# Patient Record
Sex: MALE | Race: BLACK OR AFRICAN AMERICAN | Employment: OTHER | URBAN - METROPOLITAN AREA
[De-identification: names, ages, dates, MRNs, and addresses within clinical notes are randomized per-mention and may not be internally consistent; named-entity substitution may affect disease eponyms.]

---

## 2017-03-08 ENCOUNTER — OFFICE VISIT (OUTPATIENT)
Dept: INTERNAL MEDICINE CLINIC | Age: 77
End: 2017-03-08

## 2017-03-08 VITALS
DIASTOLIC BLOOD PRESSURE: 79 MMHG | WEIGHT: 182.8 LBS | TEMPERATURE: 96.7 F | HEIGHT: 72 IN | BODY MASS INDEX: 24.76 KG/M2 | OXYGEN SATURATION: 99 % | HEART RATE: 60 BPM | SYSTOLIC BLOOD PRESSURE: 130 MMHG | RESPIRATION RATE: 18 BRPM

## 2017-03-08 DIAGNOSIS — I10 ESSENTIAL HYPERTENSION: ICD-10-CM

## 2017-03-08 DIAGNOSIS — Z00.00 ROUTINE GENERAL MEDICAL EXAMINATION AT A HEALTH CARE FACILITY: ICD-10-CM

## 2017-03-08 DIAGNOSIS — I25.5 CARDIOMYOPATHY, ISCHEMIC: ICD-10-CM

## 2017-03-08 DIAGNOSIS — R79.89 RENAL AZOTEMIA: ICD-10-CM

## 2017-03-08 DIAGNOSIS — I25.84 CORONARY ARTERY DISEASE DUE TO CALCIFIED CORONARY LESION: Primary | ICD-10-CM

## 2017-03-08 DIAGNOSIS — M75.02 ADHESIVE CAPSULITIS OF LEFT SHOULDER: ICD-10-CM

## 2017-03-08 DIAGNOSIS — E78.5 DYSLIPIDEMIA: ICD-10-CM

## 2017-03-08 DIAGNOSIS — I25.10 CORONARY ARTERY DISEASE DUE TO CALCIFIED CORONARY LESION: Primary | ICD-10-CM

## 2017-03-08 NOTE — PROGRESS NOTES
1. Have you been to the ER, urgent care clinic since your last visit? Hospitalized since your last visit? No    2. Have you seen or consulted any other health care providers outside of the [address] [place] [address] since your last visit? Include any pap smears or colon screening.  No

## 2017-03-08 NOTE — MR AVS SNAPSHOT
Visit Information Date & Time Provider Department Dept. Phone Encounter #  
 3/8/2017 10:30 AM Lavelle Gutierres MD [facility] - [facility] [phone] [id number] Upcoming Health Maintenance Date Due Pneumococcal 65+ Low/Medium Risk (2 of 2 - PCV13) 4/5/2015 INFLUENZA AGE 9 TO ADULT 8/1/2016 MEDICARE YEARLY EXAM 2/3/2017 GLAUCOMA SCREENING Q2Y 5/20/2017 DTaP/Tdap/Td series (2 - Td) 3/8/2027 Allergies as of 3/8/2017  Review Complete On: 3/8/2017 By: Eyad Hunter Severity Noted Reaction Type Reactions Clopidogrel  04/04/2014    Other (comments) bradycardia Current Immunizations  Never Reviewed Name Date Influenza Vaccine 10/31/2015, 10/1/2014 Pneumococcal Polysaccharide (PPSV-23) 4/5/2014 10:16 AM  
  
 Not reviewed this visit You Were Diagnosed With   
  
 Codes Comments Coronary artery disease due to calcified coronary lesion    -  Primary ICD-10-CM: I25.10, I25.84 ICD-9-CM: 414.00, 414.4 Essential hypertension     ICD-10-CM: I10 
ICD-9-CM: 401.9 Dyslipidemia     ICD-10-CM: E78.5 ICD-9-CM: 272.4 Cardiomyopathy, ischemic     ICD-10-CM: I25.5 ICD-9-CM: 414.8 Renal azotemia     ICD-10-CM: R79.89 ICD-9-CM: 790.6 Adhesive capsulitis of left shoulder     ICD-10-CM: M75.02 
ICD-9-CM: 726.0 Vitals BP Pulse Temp Resp Height(growth percentile) Weight(growth percentile) 130/79 (BP 1 Location: Left arm, BP Patient Position: Sitting) 60 96.7 °F (35.9 °C) (Oral) 18 6' (1.829 m) 182 lb 12.8 oz (82.9 kg) SpO2 BMI Smoking Status 99% 24.79 kg/m2 Never Smoker Vitals History BMI and BSA Data Body Mass Index Body Surface Area 24.79 kg/m 2 2.05 m 2 Preferred Pharmacy Pharmacy Name Phone [id number] [place], [place] - [address] [phone] Your Updated Medication List  
  
   
 This list is accurate as of: 3/8/17  1:20 PM.  Always use your most recent med list.  
  
  
  
  
 carvedilol 3.125 mg tablet Commonly known as:  Greg Salgado Dutasteride-Tamsulosin 0.5-0.4 mg Cm24 Commonly known as:  JANNA  
TAKE ONE CAPSULE BY MOUTH EVERY DAY  
  
 fish oil-omega-3 fatty acids 340-1,000 mg capsule Take 2 Caps by mouth daily. HYDROcodone-acetaminophen 5-325 mg per tablet Commonly known as:  Birda [place] Take 1 Tab by mouth every six (6) hours as needed for Pain. Max Daily Amount: 4 Tabs. hydrOXYzine HCl 10 mg tablet Commonly known as:  ATARAX Take 10 mg by mouth three (3) times daily as needed for Itching. latanoprost 0.005 % ophthalmic solution Commonly known as:  Christel Furbish Administer 1 Drop to both eyes nightly. lisinopril 10 mg tablet Commonly known as:  Robertha Roup Take  by mouth daily. multivitamin,tx-iron-ca-min 27-0.4 mg Tab Commonly known as:  THERA-M w/ IRON Take 1 Tab by mouth daily. nitroglycerin 0.4 mg SL tablet Commonly known as:  NITROSTAT  
0.4 mg by SubLINGual route every five (5) minutes as needed for Chest Pain.  
  
 pravastatin 80 mg tablet Commonly known as:  PRAVACHOL  
TAKE 1 TABLET BY MOUTH AT BEDTIME  
  
 salsalate 500 mg tablet Commonly known as:  DISALCID Take 1 Tab by mouth two (2) times a day. Introducing [facility] SERVICES! Dear Ela Reddy: Thank you for requesting a [employer] account. Our records indicate that you already have an active [employer] account. You can access your account anytime at [email or web address] Did you know that you can access your hospital and ER discharge instructions at any time in [employer]? You can also review all of your test results from your hospital stay or ER visit. Additional Information If you have questions, please visit the Frequently Asked Questions section of the [employer] website at [email or web address] Remember, MyChart is NOT to be used for urgent needs. For medical emergencies, dial 911. Now available from your iPhone and Android! Please provide this summary of care documentation to your next provider. Your primary care clinician is listed as Ronald Mckee. If you have any questions after today's visit, please call [phone].

## 2017-03-08 NOTE — PROGRESS NOTES
[facility]  [place] [address]  [address]  [place] [address]  Phone:  [phone]  Fax: [phone]       Chief Complaint   Patient presents with    Follow-up    Shoulder Pain     patient complain of pain in both shoulders patient states that hes been experiencing the pain in his shoulders for 2 months   . SUBJECTIVE:    Brendan Koehler is a 64 y.o. male comes in for return visit. On his last visit I told him to reduce his Lisinopril to a half a tablet. He thought I meant his Carvedilol. When he went to the cardiologist this was corrected. He did not realize he was still taking the Lisinopril but when he called home this was indeed the case. In reality he has been taking the 5 mg of Lisinopril. He denies any shortness of breath or chest pain. He most recently had his defibrillator checked but will probably have to have his battery replaced the latter part of the year. He has been complaining of pain in his shoulders, left greater than right. The right is for the most part resolved and he is left with pain in his shoulder which is aggravated with movement. He has been actively avoiding his potassium in view of his RTA picture with mild renal insufficiency. Current Outpatient Prescriptions   Medication Sig Dispense Refill    latanoprost (XALATAN) 0.005 % ophthalmic solution Administer 1 Drop to both eyes nightly.  pravastatin (PRAVACHOL) 80 mg tablet TAKE 1 TABLET BY MOUTH AT BEDTIME 90 Tab 3    salsalate (DISALCID) 500 mg tablet Take 1 Tab by mouth two (2) times a day. 180 Tab 3    Dutasteride-Tamsulosin (JANNA) 0.5-0.4 mg CM24 TAKE ONE CAPSULE BY MOUTH EVERY DAY 90 Cap 3    carvedilol (COREG) 3.125 mg tablet   12    HYDROcodone-acetaminophen (NORCO) 5-325 mg per tablet Take 1 Tab by mouth every six (6) hours as needed for Pain. Max Daily Amount: 4 Tabs.  10 Tab 0    hydrOXYzine (ATARAX) 10 mg tablet Take 10 mg by mouth three (3) times daily as needed for Itching.  nitroglycerin (NITROSTAT) 0.4 mg SL tablet 0.4 mg by SubLINGual route every five (5) minutes as needed for Chest Pain.  lisinopril (PRINIVIL, ZESTRIL) 10 mg tablet Take  by mouth daily.  fish oil-omega-3 fatty acids 340-1,000 mg capsule Take 2 Caps by mouth daily.  multivitamin,tx-iron-ca-min (THERA-M W/ IRON) 27-0.4 mg tab Take 1 Tab by mouth daily.        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    HX COLONOSCOPY      HX ORTHOPAEDIC      bilateral bunionectomy    HX PACEMAKER      VASCULAR SURGERY PROCEDURE UNLIST       Allergies   Allergen Reactions    Clopidogrel Other (comments)     bradycardia         REVIEW OF SYSTEMS:  General: negative for - chills or fever  ENT: negative for - headaches, nasal congestion or tinnitus  Respiratory: negative for - cough, hemoptysis, shortness of breath or wheezing  Cardiovascular : negative for - chest pain, edema, palpitations or shortness of breath  Gastrointestinal: negative for - abdominal pain, blood in stools, heartburn or nausea/vomiting  Genito-Urinary: no dysuria, trouble voiding, or hematuria  Musculoskeletal: negative for - gait disturbance, joint pain, joint stiffness or joint swelling  Neurological: no TIA or stroke symptoms  Hematologic: no bruises, no bleeding, no swollen glands  Integument: no lumps, mole changes, nail changes or rash  Endocrine: no malaise/lethargy or unexpected weight changes      Social History     Social History    Marital status:      Spouse name: Ethel Jimenez    Number of children: 3    Years of education: 25     Occupational History    Retired [employer]     Social History Main Topics    Smoking status: Never Smoker    Smokeless tobacco: Never Used    Alcohol use No      Comment: quit 12/10/1980    Drug use: No    Sexual activity: Not Asked     Other Topics Concern    None     Social History Narrative     Family History   Problem Relation Age of Onset    No Known Problems Mother        OBJECTIVE:    Visit Vitals    /79 (BP 1 Location: Left arm, BP Patient Position: Sitting)    Pulse 60    Temp 96.7 °F (35.9 °C) (Oral)    Resp 18    Ht 6' (1.829 m)    Wt 182 lb 12.8 oz (82.9 kg)    SpO2 99%    BMI 24.79 kg/m2     CONSTITUTIONAL: well , well nourished, appears age appropriate  EYES: perrla, eom intact  ENMT:moist mucous membranes, pharynx clear  NECK: supple. Thyroid normal  RESPIRATORY: Chest: clear to ascultation and percussion   CARDIOVASCULAR: Heart: regular rate and rhythm  GASTROINTESTINAL: Abdomen: soft, bowel sounds active  HEMATOLOGIC: no pathological lymph nodes palpated  MUSCULOSKELETAL: Extremities: no edema, pulse 1+, pain elicited to range of motion left shoulder  INTEGUMENT: No unusual rashes or suspicious skin lesions noted. Nails appear normal.  NEUROLOGIC: non-focal exam   MENTAL STATUS: alert and oriented, appropriate affect      ASSESSMENT:  1. Coronary artery disease due to calcified coronary lesion    2. Essential hypertension    3. Dyslipidemia    4. Cardiomyopathy, ischemic    5. Renal azotemia    6. Adhesive capsulitis of left shoulder    7. Routine general medical examination at a health care facility        PLAN:    1. The patient's coronary artery disease appears to be reasonably stable. He has had no suggestive angina or anginal equivalent symptoms currently. He is exercising on a fairly consistent basis. 2. Blood pressure is excellent. No adjustments are made. 3. He remains on his statin in view of his secondary cardiovascular risk prevention.     4. He does have mild renal dysfunction which leads to an RTA type picture and as a result he has a tendency to retain potassium. On his last visit I suggested he reduce his Lisinopril to half a tablet a day but he thought I meant the Carvedilol. Fortunately he has been reducing the Lisinopril. 5. He has what appears to be a pericapsulitis of his left shoulder. Under sterile technique I inject Kenalog 40 mg and Xylocaine 1% 5 cc's into the posterior aspect of the left shoulder. He tolerates the procedure well. Follow-up Disposition:  Return in about 4 months (around 7/8/2017). Hyacinth Hammans, MD    This is a Subsequent Medicare Annual Wellness Visit providing Personalized Prevention Plan Services (PPPS) (Performed 12 months after initial AWV and PPPS )    I have reviewed the patient's medical history in detail and updated the computerized patient record. 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  pravastatin (PRAVACHOL) 80 mg tablet TAKE 1 TABLET BY MOUTH AT BEDTIME 90 Tab 3    salsalate (DISALCID) 500 mg tablet Take 1 Tab by mouth two (2) times a day. 180 Tab 3    Dutasteride-Tamsulosin (JANNA) 0.5-0.4 mg CM24 TAKE ONE CAPSULE BY MOUTH EVERY DAY 90 Cap 3    carvedilol (COREG) 3.125 mg tablet   12    HYDROcodone-acetaminophen (NORCO) 5-325 mg per tablet Take 1 Tab by mouth every six (6) hours as needed for Pain. Max Daily Amount: 4 Tabs.  10 Tab 0    hydrOXYzine (ATARAX) 10 mg tablet Take 10 mg by mouth three (3) times daily as needed for Itching.  nitroglycerin (NITROSTAT) 0.4 mg SL tablet 0.4 mg by SubLINGual route every five (5) minutes as needed for Chest Pain.  lisinopril (PRINIVIL, ZESTRIL) 10 mg tablet Take  by mouth daily.  fish oil-omega-3 fatty acids 340-1,000 mg capsule Take 2 Caps by mouth daily.  multivitamin,tx-iron-ca-min (THERA-M W/ IRON) 27-0.4 mg tab Take 1 Tab by mouth daily. Allergies   Allergen Reactions    Clopidogrel Other (comments)     bradycardia     Family History   Problem Relation Age of Onset    No Known Problems Mother      Social History   Substance Use Topics    Smoking status: Never Smoker    Smokeless tobacco: Never Used    Alcohol use No      Comment: quit 12/10/1980     Patient Active Problem List   Diagnosis Code    Ankylosing spondylitis ([facility] [address] M45.9    Prostatism N40.0    Dyslipidemia E78.5    Cramp in limb R25.2    HTN (hypertension) I10    CAD (coronary artery disease) I25.10    Renal azotemia R79.89    Cardiomyopathy, ischemic I25.5       Depression Risk Factor Screening:     PHQ 2 / 9, over the last two weeks 6/8/2016   Little interest or pleasure in doing things Not at all   Feeling down, depressed or hopeless Not at all   Total Score PHQ 2 0     Alcohol Risk Factor Screening: On any occasion during the past 3 months, have you had more than 4 drinks containing alcohol? No    Do you average more than 14 drinks per week? No      Functional Ability and Level of Safety:     Hearing Loss   none    Activities of Daily Living   Self-care. Requires assistance with: no ADLs    Fall Risk     Fall Risk Assessment, last 12 mths 6/8/2016   Able to walk? Yes   Fall in past 12 months? No     Abuse Screen   Patient is not abused    Review of Systems   A comprehensive review of systems was negative.     Physical Examination     Evaluation of Cognitive Function:  Mood/affect: neutral  Appearance: age appropriate  Family member/caregiver input: na    Visit Vitals    /79 (BP 1 Location: Left arm, BP Patient Position: Sitting)    Pulse 60    Temp 96.7 °F (35.9 °C) (Oral)    Resp 18    Ht 6' (1.829 m)    Wt 182 lb 12.8 oz (82.9 kg)    SpO2 99%    BMI 24.79 kg/m2     General appearance: alert, cooperative, no distress, appears stated age  Neck: supple, symmetrical, trachea midline, no adenopathy, thyroid: not enlarged, symmetric, no tenderness/mass/nodules, no carotid bruit and no JVD  Lungs: clear to auscultation bilaterally  Heart: regular rate and rhythm, S1, S2 normal, no murmur, click, rub or gallop  Abdomen: soft, non-tender. Bowel sounds normal. No masses,  no organomegaly  Extremities: extremities normal, atraumatic, no cyanosis or edema  Neurologic: Grossly normal    Patient Care Team:  Contreras Rodriguez MD as PCP - General (Internal Medicine)    Advice/Referrals/Counseling   Education and counseling provided:  Are appropriate based on today's review and evaluation      Assessment/Plan       ICD-10-CM ICD-9-CM    1. Coronary artery disease due to calcified coronary lesion I25.10 414.00     I25.84 414.4    2. Essential hypertension I10 401.9    3. Dyslipidemia E78.5 272.4    4. Cardiomyopathy, ischemic I25.5 414.8    5. Renal azotemia R79.89 790.6    6. Adhesive capsulitis of left shoulder M75.02 726.0    7. Routine general medical examination at a health care facility Z00.00 V70.0    .

## 2017-05-06 RX ORDER — DUTASTERIDE AND TAMSULOSIN HYDROCHLORIDE CAPSULES .5; .4 MG/1; MG/1
CAPSULE ORAL
Qty: 90 CAP | Refills: 3 | Status: SHIPPED | OUTPATIENT
Start: 2017-05-06 | End: 2018-05-29 | Stop reason: SDUPTHER

## 2017-08-02 ENCOUNTER — OFFICE VISIT (OUTPATIENT)
Dept: INTERNAL MEDICINE CLINIC | Age: 77
End: 2017-08-02

## 2017-08-02 DIAGNOSIS — I10 ESSENTIAL HYPERTENSION: ICD-10-CM

## 2017-08-02 DIAGNOSIS — I25.10 CORONARY ARTERY DISEASE DUE TO CALCIFIED CORONARY LESION: ICD-10-CM

## 2017-08-02 DIAGNOSIS — E78.5 DYSLIPIDEMIA: Primary | ICD-10-CM

## 2017-08-02 DIAGNOSIS — M45.9 ANKYLOSING SPONDYLITIS, UNSPECIFIED SITE OF SPINE (HCC): ICD-10-CM

## 2017-08-02 DIAGNOSIS — N40.0 PROSTATISM: ICD-10-CM

## 2017-08-02 DIAGNOSIS — R79.89 RENAL AZOTEMIA: ICD-10-CM

## 2017-08-02 DIAGNOSIS — I25.84 CORONARY ARTERY DISEASE DUE TO CALCIFIED CORONARY LESION: ICD-10-CM

## 2017-08-02 NOTE — PROGRESS NOTES
Chief Complaint   Patient presents with    Hypertension     6 MONTH FOLLOW     Coronary Artery Disease     1. Have you been to the ER, urgent care clinic since your last visit? Hospitalized since your last visit? No    2. Have you seen or consulted any other health care providers outside of the [address] [place] [address] since your last visit? Include any pap smears or colon screening.  No

## 2017-08-02 NOTE — PROGRESS NOTES
[facility]  [place] [address]  [address] [facility] [address]  Phone:  [phone]  Fax: [phone]       Chief Complaint   Patient presents with    Hypertension     6 MONTH FOLLOW     Coronary Artery Disease   . SUBJECTIVE:    Debo Means is a 76 y.o. male Comes in for return visit stating that he has done well. He has no complaints for the most part. He denies any chest pain or shortness of breath. He is having no back pain, in the neck, lumbar or thoracic region. He does have history of prostatism and takes Jeanine. He has a past history of dyslipidemia and primary hypertension. He remains quite physically active. He takes sign language too. Current Outpatient Prescriptions   Medication Sig Dispense Refill    Dutasteride-Tamsulosin (JANNA) 0.5-0.4 mg CM24 TAKE ONE CAPSULE BY MOUTH EVERY DAY 90 Cap 3    latanoprost (XALATAN) 0.005 % ophthalmic solution Administer 1 Drop to both eyes nightly.  pravastatin (PRAVACHOL) 80 mg tablet TAKE 1 TABLET BY MOUTH AT BEDTIME 90 Tab 3    salsalate (DISALCID) 500 mg tablet Take 1 Tab by mouth two (2) times a day. 180 Tab 3    carvedilol (COREG) 3.125 mg tablet   12    HYDROcodone-acetaminophen (NORCO) 5-325 mg per tablet Take 1 Tab by mouth every six (6) hours as needed for Pain. Max Daily Amount: 4 Tabs. 10 Tab 0    hydrOXYzine (ATARAX) 10 mg tablet Take 10 mg by mouth three (3) times daily as needed for Itching.  nitroglycerin (NITROSTAT) 0.4 mg SL tablet 0.4 mg by SubLINGual route every five (5) minutes as needed for Chest Pain.  lisinopril (PRINIVIL, ZESTRIL) 10 mg tablet Take  by mouth daily.  fish oil-omega-3 fatty acids 340-1,000 mg capsule Take 2 Caps by mouth daily.  multivitamin,tx-iron-ca-min (THERA-M W/ IRON) 27-0.4 mg tab Take 1 Tab by mouth daily.        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    HX COLONOSCOPY      HX ORTHOPAEDIC      bilateral bunionectomy    HX PACEMAKER      VASCULAR SURGERY PROCEDURE UNLIST       Allergies   Allergen Reactions    Clopidogrel Other (comments)     bradycardia         REVIEW OF SYSTEMS:  General: negative for - chills or fever  ENT: negative for - headaches, nasal congestion or tinnitus  Respiratory: negative for - cough, hemoptysis, shortness of breath or wheezing  Cardiovascular : negative for - chest pain, edema, palpitations or shortness of breath  Gastrointestinal: negative for - abdominal pain, blood in stools, heartburn or nausea/vomiting  Genito-Urinary: no dysuria, trouble voiding, or hematuria  Musculoskeletal: negative for - gait disturbance, joint pain, joint stiffness or joint swelling  Neurological: no TIA or stroke symptoms  Hematologic: no bruises, no bleeding, no swollen glands  Integument: no lumps, mole changes, nail changes or rash  Endocrine: no malaise/lethargy or unexpected weight changes      Social History     Social History    Marital status:      Spouse name: Demian Ervin    Number of children: 3    Years of education: 25     Occupational History    Retired [employer]     Social History Main Topics    Smoking status: Never Smoker    Smokeless tobacco: Never Used    Alcohol use No      Comment: quit 12/10/1980    Drug use: No    Sexual activity: Not Asked     Other Topics Concern    None     Social History Narrative     Family History   Problem Relation Age of Onset    No Known Problems Mother        OBJECTIVE:    Visit Vitals     6' 1\" (1.854 m)    Wt 182 lb 3.2 oz (82.6 kg)    BMI 24.04 kg/m2     CONSTITUTIONAL: well , well nourished, appears age appropriate  EYES: perrla, eom intact  ENMT:moist mucous membranes, pharynx clear  NECK: supple. Thyroid normal  RESPIRATORY: Chest: clear to ascultation and percussion   CARDIOVASCULAR: Heart: regular rate and rhythm  GASTROINTESTINAL: Abdomen: soft, bowel sounds active  HEMATOLOGIC: no pathological lymph nodes palpated  MUSCULOSKELETAL: Extremities: no edema, pulse 1+   INTEGUMENT: No unusual rashes or suspicious skin lesions noted. Nails appear normal.  NEUROLOGIC: non-focal exam   MENTAL STATUS: alert and oriented, appropriate affect      ASSESSMENT:  1. Dyslipidemia    2. Essential hypertension    3. Coronary artery disease due to calcified coronary lesion    4. Renal azotemia    5. Prostatism    6. Ankylosing spondylitis, unspecified site of spine ([address]        PLAN:    1. He will continue statin. Efficacy and compliance will be assessed today. 2. Blood pressure is excellent. No adjustments are made. 3. His coronary artery disease appears to be stable. He follows with his cardiologist.   4. He does have mild renal azotemia, but this has been stable. I want to make sure this is not proteinuric. 5. He has no  symptoms, and will therefore remain on his Karly. PSA will be checked today. 6. His ankylosing spondylosis is quite stable. I encourage him to remain as physically active as possible. He goes to the gym three days a week. .  Orders Placed This Encounter    CBC WITH AUTOMATED DIFF    METABOLIC PANEL, BASIC    CYSTATIN C    PROTEIN,TOTAL,URINE    CREATININE, UR, RANDOM    PROSTATE SPECIFIC AG         Follow-up Disposition:  Return in about 4 months (around 12/2/2017).       Ector Li MD

## 2017-08-02 NOTE — MR AVS SNAPSHOT
Visit Information Date & Time Provider Department Dept. Phone Encounter #  
 8/2/2017 10:30 AM Andrew Page MD SPORTS MED AND PRIMARY CARE - Ti Harkins [phone] [id number] Your Appointments 12/6/2017 10:30 AM  
Any with Andrew Page MD  
[facility] [place] [facility] ([facility] Appt Note: 4 month f/u  
 [address], [facility] 330 [facility] 98  
  
   
 [address], [place] [address] [place] [address] Upcoming Health Maintenance Date Due Pneumococcal 65+ Low/Medium Risk (2 of 2 - PCV13) 4/5/2015 GLAUCOMA SCREENING Q2Y 5/20/2017 INFLUENZA AGE 9 TO ADULT 8/1/2017 MEDICARE YEARLY EXAM 3/9/2018 DTaP/Tdap/Td series (2 - Td) 3/8/2027 Allergies as of 8/2/2017  Review Complete On: 8/2/2017 By: Maura Washington Severity Noted Reaction Type Reactions Clopidogrel  04/04/2014    Other (comments) bradycardia Current Immunizations  Never Reviewed Name Date Influenza Vaccine 10/31/2015, 10/1/2014 Pneumococcal Polysaccharide (PPSV-23) 4/5/2014 10:16 AM  
  
 Not reviewed this visit You Were Diagnosed With   
  
 Codes Comments Dyslipidemia    -  Primary ICD-10-CM: E78.5 ICD-9-CM: 272.4 Essential hypertension     ICD-10-CM: I10 
ICD-9-CM: 401.9 Coronary artery disease due to calcified coronary lesion     ICD-10-CM: I25.10, I25.84 ICD-9-CM: 414.00, 414.4 Renal azotemia     ICD-10-CM: R79.89 ICD-9-CM: 790.6 Prostatism     ICD-10-CM: N40.0 ICD-9-CM: 600.90 Ankylosing spondylitis, unspecified site of spine ([facility]     ICD-10-CM: M45.9 ICD-9-CM: 720.0 Vitals Height(growth percentile) Weight(growth percentile) BMI Smoking Status 6' 1\" (1.854 m) 182 lb 3.2 oz (82.6 kg) 24.04 kg/m2 Never Smoker BMI and BSA Data Body Mass Index Body Surface Area 24.04 kg/m 2 2.06 m 2 Preferred Pharmacy Pharmacy Name Phone [id number], [place] - [address] [phone] Your Updated Medication List  
  
   
This list is accurate as of: 8/2/17 12:05 PM.  Always use your most recent med list.  
  
  
  
  
 carvedilol 3.125 mg tablet Commonly known as:  Zia Marin Dutasteride-Tamsulosin 0.5-0.4 mg Cm24 Commonly known as:  JANNA  
TAKE ONE CAPSULE BY MOUTH EVERY DAY  
  
 fish oil-omega-3 fatty acids 340-1,000 mg capsule Take 2 Caps by mouth daily. HYDROcodone-acetaminophen 5-325 mg per tablet Commonly known as:  Fay Plum Take 1 Tab by mouth every six (6) hours as needed for Pain. Max Daily Amount: 4 Tabs. hydrOXYzine HCl 10 mg tablet Commonly known as:  ATARAX Take 10 mg by mouth three (3) times daily as needed for Itching. latanoprost 0.005 % ophthalmic solution Commonly known as:  Adelbert Holding Administer 1 Drop to both eyes nightly. lisinopril 10 mg tablet Commonly known as:  Helon Estrin Take  by mouth daily. multivitamin,tx-iron-ca-min 27-0.4 mg Tab Commonly known as:  THERA-M w/ IRON Take 1 Tab by mouth daily. nitroglycerin 0.4 mg SL tablet Commonly known as:  NITROSTAT  
0.4 mg by SubLINGual route every five (5) minutes as needed for Chest Pain.  
  
 pravastatin 80 mg tablet Commonly known as:  PRAVACHOL  
TAKE 1 TABLET BY MOUTH AT BEDTIME  
  
 salsalate 500 mg tablet Commonly known as:  DISALCID Take 1 Tab by mouth two (2) times a day. We Performed the Following CBC WITH AUTOMATED DIFF [67999 CPT(R)] CREATININE, UR, RANDOM B2662919 CPT(R)] CYSTATIN C [[id number] Custom] METABOLIC PANEL, BASIC [83905 CPT(R)] PROSTATE SPECIFIC AG (PSA) [id number] CPT(R)] Fina Lack [[id number] Custom] Introducing [facility]! Dear Faye Rand: Thank you for requesting a [employer] account.   Our records indicate that you already have an active Shipwire account. You can access your account anytime at [email or web address] Did you know that you can access your hospital and ER discharge instructions at any time in Shipwire? You can also review all of your test results from your hospital stay or ER visit. Additional Information If you have questions, please visit the Frequently Asked Questions section of the Shipwire website at [email or web address] Remember, Shipwire is NOT to be used for urgent needs. For medical emergencies, dial 911. Now available from your iPhone and Android! Please provide this summary of care documentation to your next provider. Your primary care clinician is listed as Ronald Mckee. If you have any questions after today's visit, please call [phone].

## 2017-08-07 VITALS
DIASTOLIC BLOOD PRESSURE: 75 MMHG | RESPIRATION RATE: 18 BRPM | SYSTOLIC BLOOD PRESSURE: 131 MMHG | WEIGHT: 182.2 LBS | BODY MASS INDEX: 24.15 KG/M2 | TEMPERATURE: 96.8 F | OXYGEN SATURATION: 99 % | HEART RATE: 60 BPM | HEIGHT: 73 IN

## 2017-08-30 RX ORDER — PRAVASTATIN SODIUM 80 MG/1
TABLET ORAL
Qty: 90 TAB | Refills: 3 | Status: SHIPPED | OUTPATIENT
Start: 2017-08-30 | End: 2018-08-24 | Stop reason: SDUPTHER

## 2017-12-18 ENCOUNTER — OFFICE VISIT (OUTPATIENT)
Dept: INTERNAL MEDICINE CLINIC | Age: 77
End: 2017-12-18

## 2017-12-18 VITALS
OXYGEN SATURATION: 97 % | TEMPERATURE: 97.7 F | HEIGHT: 73 IN | SYSTOLIC BLOOD PRESSURE: 138 MMHG | HEART RATE: 66 BPM | WEIGHT: 178.2 LBS | DIASTOLIC BLOOD PRESSURE: 81 MMHG | RESPIRATION RATE: 16 BRPM | BODY MASS INDEX: 23.62 KG/M2

## 2017-12-18 DIAGNOSIS — M75.01 ADHESIVE CAPSULITIS OF BOTH SHOULDERS: Primary | ICD-10-CM

## 2017-12-18 DIAGNOSIS — M75.02 ADHESIVE CAPSULITIS OF BOTH SHOULDERS: Primary | ICD-10-CM

## 2017-12-18 DIAGNOSIS — E78.5 DYSLIPIDEMIA: ICD-10-CM

## 2017-12-18 DIAGNOSIS — R79.89 RENAL AZOTEMIA: ICD-10-CM

## 2017-12-18 DIAGNOSIS — I25.84 CORONARY ARTERY DISEASE DUE TO CALCIFIED CORONARY LESION: ICD-10-CM

## 2017-12-18 DIAGNOSIS — N40.0 BENIGN PROSTATIC HYPERPLASIA WITHOUT LOWER URINARY TRACT SYMPTOMS: ICD-10-CM

## 2017-12-18 DIAGNOSIS — I10 ESSENTIAL HYPERTENSION: ICD-10-CM

## 2017-12-18 DIAGNOSIS — I25.10 CORONARY ARTERY DISEASE DUE TO CALCIFIED CORONARY LESION: ICD-10-CM

## 2017-12-18 NOTE — MR AVS SNAPSHOT
Visit Information Date & Time Provider Department Dept. Phone Encounter #  
 12/18/2017 12:00 PM Lavelle Gutierres MD [facility] Sports Medicine and Primary Care [phone] [id number] Upcoming Health Maintenance Date Due  
 GLAUCOMA SCREENING Q2Y 5/20/2017 MEDICARE YEARLY EXAM 3/9/2018 DTaP/Tdap/Td series (2 - Td) 3/8/2027 Allergies as of 12/18/2017  Review Complete On: 12/18/2017 By: Nic Chery Severity Noted Reaction Type Reactions Clopidogrel  04/04/2014    Other (comments) bradycardia Current Immunizations  Never Reviewed Name Date Influenza Vaccine 10/31/2015, 10/1/2014 Pneumococcal Polysaccharide (PPSV-23) 4/5/2014 10:16 AM  
  
 Not reviewed this visit You Were Diagnosed With   
  
 Codes Comments Adhesive capsulitis of both shoulders    -  Primary ICD-10-CM: M75.01, M75.02 
ICD-9-CM: 726.0 Dyslipidemia     ICD-10-CM: E78.5 ICD-9-CM: 272.4 Essential hypertension     ICD-10-CM: I10 
ICD-9-CM: 401.9 Coronary artery disease due to calcified coronary lesion     ICD-10-CM: I25.10, I25.84 ICD-9-CM: 414.00, 414.4 Renal azotemia     ICD-10-CM: R79.89 ICD-9-CM: 790.6 Benign prostatic hyperplasia without lower urinary tract symptoms     ICD-10-CM: N40.0 ICD-9-CM: 600.00 Vitals BP Pulse Temp Resp Height(growth percentile) Weight(growth percentile) 138/81 (BP 1 Location: Right arm, BP Patient Position: Sitting) 66 97.7 °F (36.5 °C) (Oral) 16 6' 1\" (1.854 m) 178 lb 3.2 oz (80.8 kg) SpO2 BMI Smoking Status 97% 23.51 kg/m2 Never Smoker BMI and BSA Data Body Mass Index Body Surface Area  
 23.51 kg/m 2 2.04 m 2 Preferred Pharmacy Pharmacy Name Phone [id number], [place] - [address] [phone] Your Updated Medication List  
  
   
This list is accurate as of: 12/18/17  1:46 PM.  Always use your most recent med list.  
  
  
  
  
 carvedilol 3.125 mg tablet Commonly known as:  Casie Franklinning Dutasteride-Tamsulosin 0.5-0.4 mg Cm24 Commonly known as:  JANNA  
TAKE ONE CAPSULE BY MOUTH EVERY DAY  
  
 fish oil-omega-3 fatty acids 340-1,000 mg capsule Take 2 Caps by mouth daily. HYDROcodone-acetaminophen 5-325 mg per tablet Commonly known as:  Viva Johnson Take 1 Tab by mouth every six (6) hours as needed for Pain. Max Daily Amount: 4 Tabs. hydrOXYzine HCl 10 mg tablet Commonly known as:  ATARAX Take 10 mg by mouth three (3) times daily as needed for Itching. latanoprost 0.005 % ophthalmic solution Commonly known as:  Kimmie Miller Administer 1 Drop to both eyes nightly. lisinopril 10 mg tablet Commonly known as:  Brunilda Rochelle Take  by mouth daily. multivitamin,tx-iron-ca-min 27-0.4 mg Tab Commonly known as:  THERA-M w/ IRON Take 1 Tab by mouth daily. nitroglycerin 0.4 mg SL tablet Commonly known as:  NITROSTAT  
0.4 mg by SubLINGual route every five (5) minutes as needed for Chest Pain.  
  
 pravastatin 80 mg tablet Commonly known as:  PRAVACHOL  
TAKE 1 TABLET BY MOUTH AT BEDTIME  
  
 salsalate 500 mg tablet Commonly known as:  DISALCID Take 1 Tab by mouth two (2) times a day. We Performed the Following APOLIPOPROTEIN B [id number] CPT(R)] CBC WITH AUTOMATED DIFF [52312 CPT(R)] CYSTATIN C [[id number] Custom] LIPID PANEL [04534 CPT(R)] METABOLIC PANEL, COMPREHENSIVE [12603 CPT(R)] IL COLLECTION VENOUS BLOOD,VENIPUNCTURE [id number] CPT(R)] PSA, DIAGNOSTIC (PROSTATE SPECIFIC AG) [id number] CPT(R)] URINALYSIS W/ RFLX MICROSCOPIC [63761 CPT(R)] Introducing [facility]! Dear Carmen Colon: Thank you for requesting a [employer] account. Our records indicate that you already have an active [employer] account. You can access your account anytime at [email or web address] Did you know that you can access your hospital and ER discharge instructions at any time in BrightNest? You can also review all of your test results from your hospital stay or ER visit. Additional Information If you have questions, please visit the Frequently Asked Questions section of the BrightNest website at [email or web address] Remember, BrightNest is NOT to be used for urgent needs. For medical emergencies, dial 911. Now available from your iPhone and Android! Please provide this summary of care documentation to your next provider. Your primary care clinician is listed as Ronald Mckee. If you have any questions after today's visit, please call [phone].

## 2017-12-19 NOTE — PROGRESS NOTES
[address] [facility] and Primary Care  [place] [address]  [address] [place] [address]  Phone:  [phone]  Fax: [phone]       Chief Complaint   Patient presents with    Hypertension     routine follow up    . SUBJECTIVE:    Shelbie Sauceda is a 76 y.o. male comes in for a return visit generally doing well from a cardiovascular standpoint. He denies any chest pain or shortness of breath. He exercises three days per week in the gym and he does yoga. He has not had much in the way of symptoms from a musculoskeletal standpoint from his ankylosing spondylitis. He has a past history of primary hypertension and dyslipidemia. 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 He is not a great candidate for traditional NSAIDs. Current Outpatient Prescriptions   Medication Sig Dispense Refill    pravastatin (PRAVACHOL) 80 mg tablet TAKE 1 TABLET BY MOUTH AT BEDTIME 90 Tab 3    Dutasteride-Tamsulosin (JANNA) 0.5-0.4 mg CM24 TAKE ONE CAPSULE BY MOUTH EVERY DAY 90 Cap 3    latanoprost (XALATAN) 0.005 % ophthalmic solution Administer 1 Drop to both eyes nightly.  salsalate (DISALCID) 500 mg tablet Take 1 Tab by mouth two (2) times a day. 180 Tab 3    carvedilol (COREG) 3.125 mg tablet   12    HYDROcodone-acetaminophen (NORCO) 5-325 mg per tablet Take 1 Tab by mouth every six (6) hours as needed for Pain. Max Daily Amount: 4 Tabs. 10 Tab 0    hydrOXYzine (ATARAX) 10 mg tablet Take 10 mg by mouth three (3) times daily as needed for Itching.  nitroglycerin (NITROSTAT) 0.4 mg SL tablet 0.4 mg by SubLINGual route every five (5) minutes as needed for Chest Pain.  lisinopril (PRINIVIL, ZESTRIL) 10 mg tablet Take  by mouth daily.  fish oil-omega-3 fatty acids 340-1,000 mg capsule Take 2 Caps by mouth daily.  multivitamin,tx-iron-ca-min (THERA-M W/ IRON) 27-0.4 mg tab Take 1 Tab by mouth daily.        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    HX COLONOSCOPY      HX ORTHOPAEDIC      bilateral bunionectomy    HX PACEMAKER      VASCULAR SURGERY PROCEDURE UNLIST       Allergies   Allergen Reactions    Clopidogrel Other (comments)     bradycardia         REVIEW OF SYSTEMS:  General: negative for - chills or fever  ENT: negative for - headaches, nasal congestion or tinnitus  Respiratory: negative for - cough, hemoptysis, shortness of breath or wheezing  Cardiovascular : negative for - chest pain, edema, palpitations or shortness of breath  Gastrointestinal: negative for - abdominal pain, blood in stools, heartburn or nausea/vomiting  Genito-Urinary: no dysuria, trouble voiding, or hematuria  Musculoskeletal: negative for - gait disturbance, joint pain, joint stiffness or joint swelling  Neurological: no TIA or stroke symptoms  Hematologic: no bruises, no bleeding, no swollen glands  Integument: no lumps, mole changes, nail changes or rash  Endocrine: no malaise/lethargy or unexpected weight changes      Social History     Social History    Marital status:      Spouse name: [facility]    Number of children: 3    Years of education: 25     Occupational History    Retired [employer]     Social History Main Topics    Smoking status: Never Smoker    Smokeless tobacco: Never Used    Alcohol use No      Comment: quit 12/10/1980    Drug use: No    Sexual activity: Not Asked     Other Topics Concern    None     Social History Narrative     Family History   Problem Relation Age of Onset    No Known Problems Mother        OBJECTIVE:    Visit Vitals    /81 (BP 1 Location: Right arm, BP Patient Position: Sitting)    Pulse 66    Temp 97.7 °F (36.5 °C) (Oral)    Resp 16    Ht 6' 1\" (1.854 m)    Wt 178 lb 3.2 oz (80.8 kg)    SpO2 97%    BMI 23.51 kg/m2     CONSTITUTIONAL: well , well nourished, appears age appropriate  EYES: perrla, eom intact  ENMT:moist mucous membranes, pharynx clear  NECK: supple. Thyroid normal  RESPIRATORY: Chest: clear to ascultation and percussion   CARDIOVASCULAR: Heart: regular rate and rhythm  GASTROINTESTINAL: Abdomen: soft, bowel sounds active  HEMATOLOGIC: no pathological lymph nodes palpated  MUSCULOSKELETAL: Extremities: no edema, pulse 1+   INTEGUMENT: No unusual rashes or suspicious skin lesions noted. Nails appear normal.  NEUROLOGIC: non-focal exam   MENTAL STATUS: alert and oriented, appropriate affect      ASSESSMENT:  1. Adhesive capsulitis of both shoulders    2. Dyslipidemia    3. Essential hypertension    4. Coronary artery disease due to calcified coronary lesion    5. Renal azotemia    6. Benign prostatic hyperplasia without lower urinary tract symptoms        PLAN:    1. The patient probably has a pericapsulitis of both shoulders that is mild. It is worse on the left. No intervention for now. 2. He will continue his statin as prescribed and efficacy and compliance will be assessed. 3. His blood pressure is excellent today and no adjustments are made. 4. His coronary artery disease appears to be stable with no chest pain or shortness of breath. He follows up with Cardiology at least every six months. 5. He does have a history of a renal azotemia. I will continue to monitor this. 6. He also has a history of mild obstructive urinary symptoms, but this reasonably stable for now. He takes Vang for this.         .  Orders Placed This Encounter    APOLIPOPROTEIN B    CBC WITH AUTOMATED DIFF    LIPID PANEL    URINALYSIS W/ RFLX MICROSCOPIC    PROSTATE SPECIFIC AG    METABOLIC PANEL, COMPREHENSIVE    CYSTATIN C         Follow-up Disposition:  Return in about 4 months (around 4/18/2018).       Scotty Gottlieb MD

## 2017-12-20 LAB
ALBUMIN SERPL-MCNC: 4.3 G/DL (ref 3.5–4.8)
ALBUMIN/GLOB SERPL: 1.9 {RATIO} (ref 1.2–2.2)
ALP SERPL-CCNC: 38 IU/L (ref 39–117)
ALT SERPL-CCNC: 20 IU/L (ref 0–44)
APO B SERPL-MCNC: 60 MG/DL (ref 52–135)
APPEARANCE UR: CLEAR
AST SERPL-CCNC: 27 IU/L (ref 0–40)
BASOPHILS # BLD AUTO: 0.1 X10E3/UL (ref 0–0.2)
BASOPHILS NFR BLD AUTO: 3 %
BILIRUB SERPL-MCNC: 1.6 MG/DL (ref 0–1.2)
BILIRUB UR QL STRIP: NEGATIVE
BUN SERPL-MCNC: 10 MG/DL (ref 8–27)
BUN/CREAT SERPL: 7 (ref 10–24)
CALCIUM SERPL-MCNC: 9.3 MG/DL (ref 8.6–10.2)
CHLORIDE SERPL-SCNC: 104 MMOL/L (ref 96–106)
CHOLEST SERPL-MCNC: 102 MG/DL (ref 100–199)
CO2 SERPL-SCNC: 29 MMOL/L (ref 18–29)
COLOR UR: YELLOW
CREAT SERPL-MCNC: 1.45 MG/DL (ref 0.76–1.27)
CYSTATIN C SERPL-MCNC: 0.95 MG/L (ref 0.53–0.95)
EOSINOPHIL # BLD AUTO: 0.1 X10E3/UL (ref 0–0.4)
EOSINOPHIL NFR BLD AUTO: 4 %
ERYTHROCYTE [DISTWIDTH] IN BLOOD BY AUTOMATED COUNT: 13.1 % (ref 12.3–15.4)
GLOBULIN SER CALC-MCNC: 2.3 G/DL (ref 1.5–4.5)
GLUCOSE SERPL-MCNC: 75 MG/DL (ref 65–99)
GLUCOSE UR QL: NEGATIVE
HCT VFR BLD AUTO: 44.6 % (ref 37.5–51)
HDLC SERPL-MCNC: 41 MG/DL
HGB BLD-MCNC: 14.3 G/DL (ref 13–17.7)
HGB UR QL STRIP: NEGATIVE
IMM GRANULOCYTES # BLD: 0 X10E3/UL (ref 0–0.1)
IMM GRANULOCYTES NFR BLD: 0 %
KETONES UR QL STRIP: NEGATIVE
LDLC SERPL CALC-MCNC: 52 MG/DL (ref 0–99)
LEUKOCYTE ESTERASE UR QL STRIP: NEGATIVE
LYMPHOCYTES # BLD AUTO: 1.5 X10E3/UL (ref 0.7–3.1)
LYMPHOCYTES NFR BLD AUTO: 45 %
MCH RBC QN AUTO: 28.8 PG (ref 26.6–33)
MCHC RBC AUTO-ENTMCNC: 32.1 G/DL (ref 31.5–35.7)
MCV RBC AUTO: 90 FL (ref 79–97)
MICRO URNS: NORMAL
MONOCYTES # BLD AUTO: 0.3 X10E3/UL (ref 0.1–0.9)
MONOCYTES NFR BLD AUTO: 8 %
NEUTROPHILS # BLD AUTO: 1.3 X10E3/UL (ref 1.4–7)
NEUTROPHILS NFR BLD AUTO: 40 %
NITRITE UR QL STRIP: NEGATIVE
PH UR STRIP: 5 [PH] (ref 5–7.5)
PLATELET # BLD AUTO: 166 X10E3/UL (ref 150–379)
POTASSIUM SERPL-SCNC: 4.9 MMOL/L (ref 3.5–5.2)
PROT SERPL-MCNC: 6.6 G/DL (ref 6–8.5)
PROT UR QL STRIP: NEGATIVE
PSA SERPL-MCNC: 1 NG/ML (ref 0–4)
RBC # BLD AUTO: 4.97 X10E6/UL (ref 4.14–5.8)
SODIUM SERPL-SCNC: 143 MMOL/L (ref 134–144)
SP GR UR: 1.02 (ref 1–1.03)
TRIGL SERPL-MCNC: 45 MG/DL (ref 0–149)
UROBILINOGEN UR STRIP-MCNC: 0.2 MG/DL (ref 0.2–1)
VLDLC SERPL CALC-MCNC: 9 MG/DL (ref 5–40)
WBC # BLD AUTO: 3.3 X10E3/UL (ref 3.4–10.8)

## 2018-05-16 RX ORDER — SALSALATE 500 MG/1
TABLET, FILM COATED ORAL
Qty: 180 TAB | Refills: 3 | Status: SHIPPED | OUTPATIENT
Start: 2018-05-16 | End: 2019-04-28 | Stop reason: SDUPTHER

## 2018-05-22 ENCOUNTER — OFFICE VISIT (OUTPATIENT)
Dept: INTERNAL MEDICINE CLINIC | Age: 78
End: 2018-05-22

## 2018-05-22 DIAGNOSIS — I25.84 CORONARY ARTERY DISEASE DUE TO CALCIFIED CORONARY LESION: Primary | ICD-10-CM

## 2018-05-22 DIAGNOSIS — I10 ESSENTIAL HYPERTENSION: ICD-10-CM

## 2018-05-22 DIAGNOSIS — I25.5 CARDIOMYOPATHY, ISCHEMIC: ICD-10-CM

## 2018-05-22 DIAGNOSIS — R79.89 RENAL AZOTEMIA: ICD-10-CM

## 2018-05-22 DIAGNOSIS — R42 DIZZINESS: ICD-10-CM

## 2018-05-22 DIAGNOSIS — E78.5 DYSLIPIDEMIA: ICD-10-CM

## 2018-05-22 DIAGNOSIS — I25.10 CORONARY ARTERY DISEASE DUE TO CALCIFIED CORONARY LESION: Primary | ICD-10-CM

## 2018-05-22 DIAGNOSIS — Z13.31 SCREENING FOR DEPRESSION: ICD-10-CM

## 2018-05-22 DIAGNOSIS — Z13.39 SCREENING FOR ALCOHOLISM: ICD-10-CM

## 2018-05-22 DIAGNOSIS — Z00.00 WELLNESS EXAMINATION: ICD-10-CM

## 2018-05-22 NOTE — PROGRESS NOTES
Chief Complaint   Patient presents with   [facility] Annual Wellness Visit     1. Have you been to the ER, urgent care clinic since your last visit? Hospitalized since your last visit? No    2. Have you seen or consulted any other health care providers outside of the [facility] since your last visit? Include any pap smears or colon screening.  No

## 2018-05-22 NOTE — MR AVS SNAPSHOT
Bacilio Starr 
 
 
 [id number] [id number] 
[phone] Patient: Tesfaye Jackson MRN: [id number] RDK:[id number] Visit Information Date & Time Provider Department Dept. Phone Encounter #  
 5/22/2018 12:15 PM Denisa Espinoza MD [facility] and Primary Care [phone] [id number] Your Appointments 11/29/2018  9:15 AM  
Any with Denisa Espinoza MD  
[address] [place] [address] and Primary Care [address] [place] [address]) Appt Note: 6 MONTH FOLLOW UP  
 [address] [facility]  
  
   
 [place] [address] [id number] Upcoming Health Maintenance Date Due  
 GLAUCOMA SCREENING Q2Y 5/20/2017 MEDICARE YEARLY EXAM 3/14/2018 Influenza Age 5 to Adult 8/1/2018 DTaP/Tdap/Td series (2 - Td) 3/8/2027 Allergies as of 5/22/2018  Review Complete On: 5/22/2018 By: Margoth White Severity Noted Reaction Type Reactions Clopidogrel  04/04/2014    Other (comments) bradycardia Current Immunizations  Never Reviewed Name Date Influenza High Dose Vaccine PF 11/9/2017 12:00 AM  
 Influenza Vaccine 10/31/2015, 10/1/2014 Pneumococcal Polysaccharide (PPSV-23) 4/5/2014 10:16 AM  
  
 Not reviewed this visit You Were Diagnosed With   
  
 Codes Comments Coronary artery disease due to calcified coronary lesion    -  Primary ICD-10-CM: I25.10, I25.84 ICD-9-CM: 414.00, 414.4 Essential hypertension     ICD-10-CM: I10 
ICD-9-CM: 401.9 Dyslipidemia     ICD-10-CM: E78.5 ICD-9-CM: 272.4 Cardiomyopathy, ischemic     ICD-10-CM: I25.5 ICD-9-CM: 414.8 Renal azotemia     ICD-10-CM: R79.89 ICD-9-CM: 790.6 Dizziness     ICD-10-CM: E62 ICD-9-CM: 780.4 Vitals BP Pulse Temp Resp Height(growth percentile) Weight(growth percentile) 143/76 61 97.7 °F (36.5 °C) (Oral) 16 6' 1\" (1.854 m) 176 lb 14.4 oz (80.2 kg) SpO2 BMI Smoking Status 97% 23.34 kg/m2 Never Smoker Vitals History BMI and BSA Data Body Mass Index Body Surface Area  
 23.34 kg/m 2 2.03 m 2 Preferred Pharmacy Pharmacy Name Phone [id number] [address] [phone] Your Updated Medication List  
  
   
This list is accurate as of 5/22/18  1:57 PM.  Always use your most recent med list.  
  
  
  
  
 bimatoprost 0.01 % ophthalmic drops Commonly known as:  LUMIGAN Administer 1 Drop to both eyes nightly. carvedilol 3.125 mg tablet Commonly known as:  Mario Jones Dutasteride-Tamsulosin 0.5-0.4 mg Cm24 Commonly known as:  JANNA  
TAKE ONE CAPSULE BY MOUTH EVERY DAY  
  
 fish oil-omega-3 fatty acids 340-1,000 mg capsule Take 2 Caps by mouth daily. HYDROcodone-acetaminophen 5-325 mg per tablet Commonly known as:  Yosvany Lowery Take 1 Tab by mouth every six (6) hours as needed for Pain. Max Daily Amount: 4 Tabs. hydrOXYzine HCl 10 mg tablet Commonly known as:  ATARAX Take 10 mg by mouth three (3) times daily as needed for Itching. latanoprost 0.005 % ophthalmic solution Commonly known as:  Maulikata [place] Administer 1 Drop to both eyes nightly. lisinopril 10 mg tablet Commonly known as:  Kevin Callaway Take  by mouth daily. multivitamin,tx-iron-ca-min 27-0.4 mg Tab Commonly known as:  THERA-M w/ IRON Take 1 Tab by mouth daily. nitroglycerin 0.4 mg SL tablet Commonly known as:  NITROSTAT  
0.4 mg by SubLINGual route every five (5) minutes as needed for Chest Pain.  
  
 pravastatin 80 mg tablet Commonly known as:  PRAVACHOL  
TAKE 1 TABLET BY MOUTH AT BEDTIME  
  
 RETAINE HPMC 0.3 % Drop Generic drug:  Artifi. Tear (Hypromellose)-PF Apply 1 Drop to eye two (2) times a day. salsalate 500 mg tablet Commonly known as:  DISALCID  
TAKE 1 TAB BY MOUTH TWO (2) TIMES A DAY. SIMBRINZA 1-0.2 % Drps Generic drug:  brinzolamide-brimonidine Apply 1 Drop to eye two (2) times a day. We Performed the Following METABOLIC PANEL, BASIC [06030 CPT(R)] Introducing [facility]! Dear Jessica Spence: Thank you for requesting a [employer] account. Our records indicate that you already have an active W[employer] Online account. You can access your account anytime at [email or web address] Did you know that you can access your hospital and ER discharge instructions at any time in Wifi Online? You can also review all of your test results from your hospital stay or ER visit. Additional Information If you have questions, please visit the Frequently Asked Questions section of the Wifi Online website at [email or web address]/. Remember, W[employer] Online is NOT to be used for urgent needs. For medical emergencies, dial 911. Now available from your iPhone and Android! Please provide this summary of care documentation to your next provider. Your primary care clinician is listed as Ronald Mckee. If you have any questions after today's visit, please call [phone].

## 2018-05-22 NOTE — PROGRESS NOTES
[address] [place] [address] and Primary Care  [place] [address]  [address] [address]  Phone:  [phone]  Fax: [phone]       Chief Complaint   Patient presents with   [facility] Wellness Visit   . SUBJECTIVE:    Nadira Estrada is a [de-identified] y.o. male comes in for return visit stating that he has done well. He has no chest pain. He follows up with cardiology on a regular basis. His cardiologist has him doing a carotid doppler. He has a past history of primary hypertension and dyslipidemia as well as prostatism. He also appears to be having some short-term memory problems but it is not interfering with his functional status. He exercises on a regular basis. Current Outpatient Prescriptions   Medication Sig Dispense Refill    bimatoprost (LUMIGAN) 0.01 % ophthalmic drops Administer 1 Drop to both eyes nightly.  brinzolamide-brimonidine (SIMBRINZA) 1-0.2 % drps Apply 1 Drop to eye two (2) times a day.  Artifi. Tear, Hypromellose,-PF (RETAINE HPMC) 0.3 % drop Apply 1 Drop to eye two (2) times a day.  salsalate (DISALCID) 500 mg tablet TAKE 1 TAB BY MOUTH TWO (2) TIMES A DAY. 180 Tab 3    pravastatin (PRAVACHOL) 80 mg tablet TAKE 1 TABLET BY MOUTH AT BEDTIME 90 Tab 3    Dutasteride-Tamsulosin (JANNA) 0.5-0.4 mg CM24 TAKE ONE CAPSULE BY MOUTH EVERY DAY 90 Cap 3    carvedilol (COREG) 3.125 mg tablet   12    nitroglycerin (NITROSTAT) 0.4 mg SL tablet 0.4 mg by SubLINGual route every five (5) minutes as needed for Chest Pain.  lisinopril (PRINIVIL, ZESTRIL) 10 mg tablet Take  by mouth daily.  fish oil-omega-3 fatty acids 340-1,000 mg capsule Take 2 Caps by mouth daily.  multivitamin,tx-iron-ca-min (THERA-M W/ IRON) 27-0.4 mg tab Take 1 Tab by mouth daily.  latanoprost (XALATAN) 0.005 % ophthalmic solution Administer 1 Drop to both eyes nightly.       HYDROcodone-acetaminophen (NORCO) 5-325 mg per tablet Take 1 Tab by mouth every six (6) hours as needed for Pain. Max Daily Amount: 4 Tabs. 10 Tab 0    hydrOXYzine (ATARAX) 10 mg tablet Take 10 mg by mouth three (3) times daily as needed for Itching.        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    HX COLONOSCOPY      HX ORTHOPAEDIC      bilateral bunionectomy    HX PACEMAKER      VASCULAR SURGERY PROCEDURE UNLIST       Allergies   Allergen Reactions    Clopidogrel Other (comments)     bradycardia         REVIEW OF SYSTEMS:  General: negative for - chills or fever  ENT: negative for - headaches, nasal congestion or tinnitus  Respiratory: negative for - cough, hemoptysis, shortness of breath or wheezing  Cardiovascular : negative for - chest pain, edema, palpitations or shortness of breath  Gastrointestinal: negative for - abdominal pain, blood in stools, heartburn or nausea/vomiting  Genito-Urinary: no dysuria, trouble voiding, or hematuria  Musculoskeletal: negative for - gait disturbance, joint pain, joint stiffness or joint swelling  Neurological: no TIA or stroke symptoms  Hematologic: no bruises, no bleeding, no swollen glands  Integument: no lumps, mole changes, nail changes or rash  Endocrine: no malaise/lethargy or unexpected weight changes      Social History     Social History    Marital status:      Spouse name: Karsten Salinas    Number of children: 3    Years of education: 25     Occupational History    Retired [employer]     Social History Main Topics    Smoking status: Never Smoker    Smokeless tobacco: Never Used    Alcohol use No      Comment: quit 12/10/1980    Drug use: No    Sexual activity: Yes     Partners: Female     Other Topics Concern    None Social History Narrative     Family History   Problem Relation Age of Onset    No Known Problems Mother        OBJECTIVE:    Visit Vitals    /76  Comment: repeat 130/78    Pulse 61    Temp 97.7 °F (36.5 °C) (Oral)    Resp 16    Ht 6' 1\" (1.854 m)    Wt 176 lb 14.4 oz (80.2 kg)    SpO2 97%    BMI 23.34 kg/m2     CONSTITUTIONAL: well , well nourished, appears age appropriate  EYES: perrla, eom intact  ENMT:moist mucous membranes, pharynx clear  NECK: supple. Thyroid normal  RESPIRATORY: Chest: clear to ascultation and percussion   CARDIOVASCULAR: Heart: regular rate and rhythm  GASTROINTESTINAL: Abdomen: soft, bowel sounds active  HEMATOLOGIC: no pathological lymph nodes palpated  MUSCULOSKELETAL: Extremities: no edema, pulse 1+   INTEGUMENT: No unusual rashes or suspicious skin lesions noted. Nails appear normal.  NEUROLOGIC: non-focal exam   MENTAL STATUS: alert and oriented, appropriate affect      ASSESSMENT:  1. Coronary artery disease due to calcified coronary lesion    2. Essential hypertension    3. Dyslipidemia    4. Cardiomyopathy, ischemic    5. Renal azotemia    6. Dizziness    7. Screening for alcoholism    8. Screening for depression    9. Wellness examination        PLAN:    1. From a cardiac perspective, I think he is doing quite well. He will follow-up with cardiology at this point. 2. Renal function will be assessed in view of his mild renal dysfunction that has occurred. 3. Blood pressure is excellent today at 130/78, no adjustments are made. 4. He will continue statin as prescribed in view of his secondary cardiovascular risk prevention status. 5. He remains quite functional.  His wife mentioned to me about a year ago potential problems with his short-term memory. He communicates reasonably well currently. 6. I encouraged him to continue his physical activity. Also, mental stimulation is quite important. 6.  He also has mild orthostatic dizziness.   This is not blood pressure related. He just has to get up slower at this point. I will characterize this as dysequilibrium. 7.  Renal function will be checked. Historically, he has been quite stable of late. .  Orders Placed This Encounter    Depression Screen Annual    METABOLIC PANEL, BASIC    bimatoprost (LUMIGAN) 0.01 % ophthalmic drops    brinzolamide-brimonidine (SIMBRINZA) 1-0.2 % drps    Artifi. Tear, Hypromellose,-PF (RETAINE HPMC) 0.3 % drop         Follow-up Disposition:  Return in about 4 months (around 9/22/2018). Tre Concepcion MD      This is the Subsequent Medicare Annual Wellness Exam, performed 12 months or more after the Initial AWV or the last Subsequent AWV    I have reviewed the patient's medical history in detail and updated the computerized patient record. 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  brinzolamide-brimonidine (SIMBRINZA) 1-0.2 % drps Apply 1 Drop to eye two (2) times a day.  Artifi. Tear, Hypromellose,-PF (RETAINE HPMC) 0.3 % drop Apply 1 Drop to eye two (2) times a day.  salsalate (DISALCID) 500 mg tablet TAKE 1 TAB BY MOUTH TWO (2) TIMES A DAY.  180 Tab 3    pravastatin (PRAVACHOL) 80 mg tablet TAKE 1 TABLET BY MOUTH AT BEDTIME 90 Tab 3    Dutasteride-Tamsulosin (JANNA) 0.5-0.4 mg CM24 TAKE ONE CAPSULE BY MOUTH EVERY DAY 90 Cap 3    carvedilol (COREG) 3.125 mg tablet   12    nitroglycerin (NITROSTAT) 0.4 mg SL tablet 0.4 mg by SubLINGual route every five (5) minutes as needed for Chest Pain.  lisinopril (PRINIVIL, ZESTRIL) 10 mg tablet Take  by mouth daily.  fish oil-omega-3 fatty acids 340-1,000 mg capsule Take 2 Caps by mouth daily.  multivitamin,tx-iron-ca-min (THERA-M W/ IRON) 27-0.4 mg tab Take 1 Tab by mouth daily.  latanoprost (XALATAN) 0.005 % ophthalmic solution Administer 1 Drop to both eyes nightly.  HYDROcodone-acetaminophen (NORCO) 5-325 mg per tablet Take 1 Tab by mouth every six (6) hours as needed for Pain. Max Daily Amount: 4 Tabs. 10 Tab 0    hydrOXYzine (ATARAX) 10 mg tablet Take 10 mg by mouth three (3) times daily as needed for Itching. Allergies   Allergen Reactions    Clopidogrel Other (comments)     bradycardia     Family History   Problem Relation Age of Onset    No Known Problems Mother      Social History   Substance Use Topics    Smoking status: Never Smoker    Smokeless tobacco: Never Used    Alcohol use No      Comment: quit 12/10/1980     Patient Active Problem List   Diagnosis Code    [facility] M45.9    Prostatism N40.0    Dyslipidemia E78.5    Cramp in limb R25.2    HTN (hypertension) I10    CAD (coronary artery disease) I25.10    Renal azotemia R79.89    Cardiomyopathy, ischemic I25.5    Dizziness R42       Depression Risk Factor Screening:     PHQ over the last two weeks 5/22/2018   PHQ Not Done -   Little interest or pleasure in doing things Not at all   Feeling down, depressed or hopeless Not at all   Total Score PHQ 2 0     Alcohol Risk Factor Screening: You do not drink alcohol or very rarely. Functional Ability and Level of Safety:   Hearing Loss  Hearing is good. Activities of Daily Living  The home contains: no safety equipment. Patient does total self care    Fall Risk  Fall Risk Assessment, last 12 mths 5/22/2018   Able to walk?  Yes   Fall in past 12 months? No       Abuse Screen  Patient is not abused    Cognitive Screening   Evaluation of Cognitive Function:  Has your family/caregiver stated any concerns about your memory: no  Normal    Patient Care Team   Patient Care Team:  Jarred Fuentes MD as PCP - General (Internal Medicine)    Assessment/Plan   Education and counseling provided:  Are appropriate based on today's review and evaluation    Diagnoses and all orders for this visit:    1. Coronary artery disease due to calcified coronary lesion    2. Essential hypertension    3. Dyslipidemia    4. Cardiomyopathy, ischemic    5. Renal azotemia  -     METABOLIC PANEL, BASIC    6. Dizziness    7. Screening for alcoholism  -     Annual  Alcohol Screen 15 min ()    8. Screening for depression  -     Depression Screen Annual    9.  Wellness examination        Health Maintenance Due   Topic Date Due    GLAUCOMA SCREENING Q2Y  05/20/2017

## 2018-05-23 LAB
BUN SERPL-MCNC: 16 MG/DL (ref 8–27)
BUN/CREAT SERPL: 11 (ref 10–24)
CALCIUM SERPL-MCNC: 9.7 MG/DL (ref 8.6–10.2)
CHLORIDE SERPL-SCNC: 104 MMOL/L (ref 96–106)
CO2 SERPL-SCNC: 26 MMOL/L (ref 18–29)
CREAT SERPL-MCNC: 1.51 MG/DL (ref 0.76–1.27)
GFR SERPLBLD CREATININE-BSD FMLA CKD-EPI: 43 ML/MIN/1.73
GFR SERPLBLD CREATININE-BSD FMLA CKD-EPI: 50 ML/MIN/1.73
GLUCOSE SERPL-MCNC: 73 MG/DL (ref 65–99)
POTASSIUM SERPL-SCNC: 5.1 MMOL/L (ref 3.5–5.2)
SODIUM SERPL-SCNC: 144 MMOL/L (ref 134–144)

## 2018-05-27 VITALS
BODY MASS INDEX: 23.44 KG/M2 | DIASTOLIC BLOOD PRESSURE: 76 MMHG | OXYGEN SATURATION: 97 % | WEIGHT: 176.9 LBS | SYSTOLIC BLOOD PRESSURE: 143 MMHG | RESPIRATION RATE: 16 BRPM | HEART RATE: 61 BPM | HEIGHT: 73 IN | TEMPERATURE: 97.7 F

## 2018-05-30 RX ORDER — DUTASTERIDE AND TAMSULOSIN HYDROCHLORIDE CAPSULES .5; .4 MG/1; MG/1
CAPSULE ORAL
Qty: 90 CAP | Refills: 3 | Status: SHIPPED | OUTPATIENT
Start: 2018-05-30 | End: 2020-05-25

## 2018-08-24 RX ORDER — PRAVASTATIN SODIUM 80 MG/1
TABLET ORAL
Qty: 90 TAB | Refills: 3 | Status: SHIPPED | OUTPATIENT
Start: 2018-08-24 | End: 2019-08-17 | Stop reason: SDUPTHER

## 2018-09-24 ENCOUNTER — OFFICE VISIT (OUTPATIENT)
Dept: INTERNAL MEDICINE CLINIC | Age: 78
End: 2018-09-24

## 2018-09-24 VITALS
HEIGHT: 73 IN | BODY MASS INDEX: 23.79 KG/M2 | WEIGHT: 179.5 LBS | RESPIRATION RATE: 16 BRPM | HEART RATE: 59 BPM | SYSTOLIC BLOOD PRESSURE: 148 MMHG | TEMPERATURE: 97.8 F | OXYGEN SATURATION: 97 % | DIASTOLIC BLOOD PRESSURE: 86 MMHG

## 2018-09-24 DIAGNOSIS — E78.5 DYSLIPIDEMIA: ICD-10-CM

## 2018-09-24 DIAGNOSIS — Z23 ENCOUNTER FOR IMMUNIZATION: ICD-10-CM

## 2018-09-24 DIAGNOSIS — I25.10 ASHD (ARTERIOSCLEROTIC HEART DISEASE): ICD-10-CM

## 2018-09-24 DIAGNOSIS — I10 ESSENTIAL HYPERTENSION: ICD-10-CM

## 2018-09-24 DIAGNOSIS — R79.89 RENAL AZOTEMIA: ICD-10-CM

## 2018-09-24 DIAGNOSIS — R41.3 MEMORY DEFICIT: Primary | ICD-10-CM

## 2018-09-24 NOTE — PATIENT INSTRUCTIONS
Vaccine Information Statement Influenza (Flu) Vaccine (Inactivated or Recombinant): What you need to know Many Vaccine Information Statements are available in Swedish and other languages. See www.immunize.org/vis Hojas de Información Sobre Vacunas están disponibles en Español y en muchos otros idiomas. Visite www.immunize.org/vis 1. Why get vaccinated? Influenza (flu) is a contagious disease that spreads around the [place] every year, usually between October and May. Flu is caused by influenza viruses, and is spread mainly by coughing, sneezing, and close contact. Anyone can get flu. Flu strikes suddenly and can last several days. Symptoms vary by age, but can include: 
 fever/chills  sore throat  muscle aches  fatigue  cough  headache  runny or stuffy nose Flu can also lead to pneumonia and blood infections, and cause diarrhea and seizures in children. If you have a medical condition, such as heart or lung disease, flu can make it worse. Flu is more dangerous for some people. Infants and young children, people 72years of age and older, pregnant women, and people with certain health conditions or a weakened immune system are at greatest risk. Each year thousands of people in the [employer] die from flu, and many more are hospitalized. Flu vaccine can: 
 keep you from getting flu, 
 make flu less severe if you do get it, and 
 keep you from spreading flu to your family and other people. 2. Inactivated and recombinant flu vaccines A dose of flu vaccine is recommended every flu season. Children 6 months through 6years of age may need two doses during the same flu season. Everyone else needs only one dose each flu season.   
 
 
Some inactivated flu vaccines contain a very small amount of a mercury-based preservative called thimerosal. Studies have not shown thimerosal in vaccines to be harmful, but flu vaccines that do not contain thimerosal are available. There is no live flu virus in flu shots. They cannot cause the flu. There are many flu viruses, and they are always changing. Each year a new flu vaccine is made to protect against three or four viruses that are likely to cause disease in the upcoming flu season. But even when the vaccine doesnt exactly match these viruses, it may still provide some protection Flu vaccine cannot prevent: 
 flu that is caused by a virus not covered by the vaccine, or 
 illnesses that look like flu but are not. It takes about 2 weeks for protection to develop after vaccination, and protection lasts through the flu season. 3. Some people should not get this vaccine Tell the person who is giving you the vaccine:  If you have any severe, life-threatening allergies. If you ever had a life-threatening allergic reaction after a dose of flu vaccine, or have a severe allergy to any part of this vaccine, you may be advised not to get vaccinated. Most, but not all, types of flu vaccine contain a small amount of egg protein.  If you ever had Guillain-Barré Syndrome (also called GBS). Some people with a history of GBS should not get this vaccine. This should be discussed with your doctor.  If you are not feeling well. It is usually okay to get flu vaccine when you have a mild illness, but you might be asked to come back when you feel better. 4. Risks of a vaccine reaction With any medicine, including vaccines, there is a chance of reactions. These are usually mild and go away on their own, but serious reactions are also possible. Most people who get a flu shot do not have any problems with it. Minor problems following a flu shot include:  
 soreness, redness, or swelling where the shot was given  hoarseness  sore, red or itchy eyes  cough  fever  aches  headache  itching  fatigue If these problems occur, they usually begin soon after the shot and last 1 or 2 days. More serious problems following a flu shot can include the following:  There may be a small increased risk of Guillain-Barré Syndrome (GBS) after inactivated flu vaccine. This risk has been estimated at 1 or 2 additional cases per million people vaccinated. This is much lower than the risk of severe complications from flu, which can be prevented by flu vaccine.  Young children who get the flu shot along with pneumococcal vaccine (PCV13) and/or DTaP vaccine at the same time might be slightly more likely to have a seizure caused by fever. Ask your doctor for more information. Tell your doctor if a child who is getting flu vaccine has ever had a seizure. Problems that could happen after any injected vaccine:  People sometimes faint after a medical procedure, including vaccination. Sitting or lying down for about 15 minutes can help prevent fainting, and injuries caused by a fall. Tell your doctor if you feel dizzy, or have vision changes or ringing in the ears.  Some people get severe pain in the shoulder and have difficulty moving the arm where a shot was given. This happens very rarely.  Any medication can cause a severe allergic reaction. Such reactions from a vaccine are very rare, estimated at about 1 in a million doses, and would happen within a few minutes to a few hours after the vaccination. As with any medicine, there is a very remote chance of a vaccine causing a serious injury or death. The safety of vaccines is always being monitored. For more information, visit: www.cdc.gov/vaccinesafety/ 
 
5. What if there is a serious reaction? What should I look for?  Look for anything that concerns you, such as signs of a severe allergic reaction, very high fever, or unusual behavior.  
 
Signs of a severe allergic reaction can include hives, swelling of the face and throat, difficulty breathing, a fast heartbeat, dizziness, and weakness  usually within a few minutes to a few hours after the vaccination. What should I do?  If you think it is a severe allergic reaction or other emergency that cant wait, call 9-1-1 and get the person to the nearest hospital. Otherwise, call your doctor.  Reactions should be reported to the Vaccine Adverse Event Reporting System (VAERS). Your doctor should file this report, or you can do it yourself through  the VAERS web site at www.vaers. [facility].gov, or by calling 5-245.592.7248. VAERS does not give medical advice. 6. The National Vaccine Injury Compensation Program 
 
The [facility] Vaccine Injury Compensation Program (VICP) is a federal program that was created to compensate people who may have been injured by certain vaccines. Persons who believe they may have been injured by a vaccine can learn about the program and about filing a claim by calling 1-670.429.4820 or visiting the [facility] [employer] website at www.[facility].gov/vaccinecompensation. There is a time limit to file a claim for compensation. 7. How can I learn more?  Ask your healthcare provider. He or she can give you the vaccine package insert or suggest other sources of information.  Call your local or state health department.  Contact the Centers for Disease Control and Prevention (CDC): 
- Call 3-437.553.8839 (1-800-CDC-INFO) or 
- Visit CDCs website at www.cdc.gov/flu Vaccine Information Statement Inactivated Influenza Vaccine 8/7/2015 
42 ALEC [address]otti 934TL-52 Department of Health and [place] Centers for Disease Control and Prevention Office Use Only

## 2018-09-24 NOTE — MR AVS SNAPSHOT
[facility] 
 
 
 [id number] 
[phone] Patient: Stan Amin MRN: [id number] Visit Information Date & Time Provider Department Dept. Phone Encounter #  
 9/24/2018 10:30 AM Chloe Isaacs MD [facility] [address] [id number] Your Appointments 1/21/2019 10:30 AM  
Any with Chloe Isaacs MD  
[address] [place] [address] and Primary Care [facility]) Appt Note: 4 Month Follow Up  
 [place] [address] [facility]  
  
   
 [id number] Upcoming Health Maintenance Date Due Shingrix Vaccine Age 50> (1 of 2) 5/12/1988 GLAUCOMA SCREENING Q2Y 5/20/2017 Influenza Age 5 to Adult 8/1/2018 MEDICARE YEARLY EXAM 5/23/2019 DTaP/Tdap/Td series (2 - Td) 3/8/2027 Allergies as of 9/24/2018  Review Complete On: 9/24/2018 By: Ger Garcia Severity Noted Reaction Type Reactions Clopidogrel  04/04/2014    Other (comments) bradycardia Current Immunizations  Never Reviewed Name Date Influenza High Dose Vaccine PF  Incomplete, 11/9/2017 12:00 AM  
 Influenza Vaccine 10/31/2015, 10/1/2014 Pneumococcal Polysaccharide (PPSV-23) 4/5/2014 10:16 AM  
  
 Not reviewed this visit You Were Diagnosed With   
  
 Codes Comments Encounter for immunization    -  Primary ICD-10-CM: M42 ICD-9-CM: V03.89 Memory deficit     ICD-10-CM: R41.3 ICD-9-CM: 780.93 Essential hypertension     ICD-10-CM: I10 
ICD-9-CM: 401.9 Renal azotemia     ICD-10-CM: R79.89 ICD-9-CM: 790.6 Dyslipidemia     ICD-10-CM: E78.5 ICD-9-CM: 272.4 Vitals BP Pulse Temp Resp Height(growth percentile) Weight(growth percentile) 148/86 (!) 59 97.8 °F (36.6 °C) (Oral) 16 6' 1\" (1.854 m) 179 lb 8 oz (81.4 kg) SpO2 BMI Smoking Status 97% 23.68 kg/m2 Never Smoker Vitals History BMI and BSA Data Body Mass Index Body Surface Area  
 23.68 kg/m 2 2.05 m 2 Preferred Pharmacy Pharmacy Name Phone [id number]- [place], [place] - [address] [phone] Your Updated Medication List  
  
   
This list is accurate as of 9/24/18 12:55 PM.  Always use your most recent med list.  
  
  
  
  
 bimatoprost 0.01 % ophthalmic drops Commonly known as:  LUMIGAN Administer 1 Drop to both eyes nightly. carvedilol 3.125 mg tablet Commonly known as:  Gillermina Begun Dutasteride-Tamsulosin 0.5-0.4 mg Cm24 TAKE ONE CAPSULE BY MOUTH EVERY DAY  
  
 fish oil-omega-3 fatty acids 340-1,000 mg capsule Take 2 Caps by mouth daily. HYDROcodone-acetaminophen 5-325 mg per tablet Commonly known as:  Shonda Barboza Take 1 Tab by mouth every six (6) hours as needed for Pain. Max Daily Amount: 4 Tabs. hydrOXYzine HCl 10 mg tablet Commonly known as:  ATARAX Take 10 mg by mouth three (3) times daily as needed for Itching. latanoprost 0.005 % ophthalmic solution Commonly known as:  Jenn Barbara Administer 1 Drop to both eyes nightly. lisinopril 10 mg tablet Commonly known as:  Devyn Lerma Take  by mouth daily. multivitamin,tx-iron-ca-min 27-0.4 mg Tab Commonly known as:  THERA-M w/ IRON Take 1 Tab by mouth daily. nitroglycerin 0.4 mg SL tablet Commonly known as:  NITROSTAT  
0.4 mg by SubLINGual route every five (5) minutes as needed for Chest Pain.  
  
 pravastatin 80 mg tablet Commonly known as:  PRAVACHOL  
TAKE 1 TABLET BY MOUTH AT BEDTIME  
  
 RETAINE HPMC 0.3 % Drop Generic drug:  Artifi. Tear (Hypromellose)-PF Apply 1 Drop to eye two (2) times a day. salsalate 500 mg tablet Commonly known as:  DISALCID  
TAKE 1 TAB BY MOUTH TWO (2) TIMES A DAY. SIMBRINZA 1-0.2 % Drps Generic drug:  brinzolamide-brimonidine Apply 1 Drop to eye two (2) times a day. We Performed the Following APOLIPOPROTEIN B [id number] CPT(R)] CYSTATIN C [[id number] Custom] INFLUENZA VIRUS VACCINE, HIGH DOSE SEASONAL, PRESERVATIVE FREE [32050 CPT(R)] LIPID PANEL [75830 CPT(R)] METABOLIC PANEL, BASIC [88245 CPT(R)] [place] IMMUNIZ ADMIN,1 SINGLE/COMB VAC/TOXOID W386329 CPT(R)] Patient Instructions Vaccine Information Statement Influenza (Flu) Vaccine (Inactivated or Recombinant): What you need to know Many Vaccine Information Statements are available in Citizen of [place] and other languages. See www.immunize.org/vis Hojas de Información Sobre Vacunas están disponibles en Español y en muchos otros idiomas. Visite www.immunize.org/vis 1. Why get vaccinated? Influenza (flu) is a contagious disease that spreads around the [place] every year, usually between October and May. Flu is caused by influenza viruses, and is spread mainly by coughing, sneezing, and close contact. Anyone can get flu. Flu strikes suddenly and can last several days. Symptoms vary by age, but can include: 
 fever/chills  sore throat  muscle aches  fatigue  cough  headache  runny or stuffy nose Flu can also lead to pneumonia and blood infections, and cause diarrhea and seizures in children. If you have a medical condition, such as heart or lung disease, flu can make it worse. Flu is more dangerous for some people. Infants and young children, people 72years of age and older, pregnant women, and people with certain health conditions or a weakened immune system are at greatest risk. Each year thousands of people in the [facility] die from flu, and many more are hospitalized. Flu vaccine can: 
 keep you from getting flu, 
 make flu less severe if you do get it, and 
 keep you from spreading flu to your family and other people. 2. Inactivated and recombinant flu vaccines A dose of flu vaccine is recommended every flu season.  Children 6 months through 6years of age may need two doses during the same flu season. Everyone else needs only one dose each flu season. Some inactivated flu vaccines contain a very small amount of a mercury-based preservative called thimerosal. Studies have not shown thimerosal in vaccines to be harmful, but flu vaccines that do not contain thimerosal are available. There is no live flu virus in flu shots. They cannot cause the flu. There are many flu viruses, and they are always changing. Each year a new flu vaccine is made to protect against three or four viruses that are likely to cause disease in the upcoming flu season. But even when the vaccine doesnt exactly match these viruses, it may still provide some protection Flu vaccine cannot prevent: 
 flu that is caused by a virus not covered by the vaccine, or 
 illnesses that look like flu but are not. It takes about 2 weeks for protection to develop after vaccination, and protection lasts through the flu season. 3. Some people should not get this vaccine Tell the person who is giving you the vaccine:  If you have any severe, life-threatening allergies. If you ever had a life-threatening allergic reaction after a dose of flu vaccine, or have a severe allergy to any part of this vaccine, you may be advised not to get vaccinated. Most, but not all, types of flu vaccine contain a small amount of egg protein.  If you ever had Guillain-Barré Syndrome (also called GBS). Some people with a history of GBS should not get this vaccine. This should be discussed with your doctor.  If you are not feeling well. It is usually okay to get flu vaccine when you have a mild illness, but you might be asked to come back when you feel better. 4. Risks of a vaccine reaction With any medicine, including vaccines, there is a chance of reactions. These are usually mild and go away on their own, but serious reactions are also possible. Most people who get a flu shot do not have any problems with it. Minor problems following a flu shot include:  
 soreness, redness, or swelling where the shot was given  hoarseness  sore, red or itchy eyes  cough  fever  aches  headache  itching  fatigue If these problems occur, they usually begin soon after the shot and last 1 or 2 days. More serious problems following a flu shot can include the following:  There may be a small increased risk of Guillain-Barré Syndrome (GBS) after inactivated flu vaccine. This risk has been estimated at 1 or 2 additional cases per million people vaccinated. This is much lower than the risk of severe complications from flu, which can be prevented by flu vaccine.  Young children who get the flu shot along with pneumococcal vaccine (PCV13) and/or DTaP vaccine at the same time might be slightly more likely to have a seizure caused by fever. Ask your doctor for more information. Tell your doctor if a child who is getting flu vaccine has ever had a seizure. Problems that could happen after any injected vaccine:  People sometimes faint after a medical procedure, including vaccination. Sitting or lying down for about 15 minutes can help prevent fainting, and injuries caused by a fall. Tell your doctor if you feel dizzy, or have vision changes or ringing in the ears.  Some people get severe pain in the shoulder and have difficulty moving the arm where a shot was given. This happens very rarely.  Any medication can cause a severe allergic reaction. Such reactions from a vaccine are very rare, estimated at about 1 in a million doses, and would happen within a few minutes to a few hours after the vaccination. As with any medicine, there is a very remote chance of a vaccine causing a serious injury or death. The safety of vaccines is always being monitored.  For more information, visit: www.cdc.gov/vaccinesafety/ 
 
 5. What if there is a serious reaction? What should I look for?  Look for anything that concerns you, such as signs of a severe allergic reaction, very high fever, or unusual behavior. Signs of a severe allergic reaction can include hives, swelling of the face and throat, difficulty breathing, a fast heartbeat, dizziness, and weakness  usually within a few minutes to a few hours after the vaccination. What should I do?  If you think it is a severe allergic reaction or other emergency that cant wait, call 9-1-1 and get the person to the nearest hospital. Otherwise, call your doctor.  Reactions should be reported to the Vaccine Adverse Event Reporting System (VAERS). Your doctor should file this report, or you can do it yourself through  the VAERS web site at www.vaers. [facility].gov, or by calling 8-446.522.5455. VAERS does not give medical advice. 6. The National Vaccine Injury Compensation Program 
 
The [facility] Vaccine Injury Compensation Program (VICP) is a federal program that was created to compensate people who may have been injured by certain vaccines. Persons who believe they may have been injured by a vaccine can learn about the program and about filing a claim by calling 9-712.662.6830 or visiting the [address] [place] [address] website at www.[facility].gov/vaccinecompensation. There is a time limit to file a claim for compensation. 7. How can I learn more?  Ask your healthcare provider. He or she can give you the vaccine package insert or suggest other sources of information.  Call your local or state health department.  Contact the Centers for Disease Control and Prevention (CDC): 
- Call 4-457.554.8488 (1-800-CDC-INFO) or 
- Visit CDCs website at www.cdc.gov/flu Vaccine Information Statement Inactivated Influenza Vaccine 8/7/2015 
[address]enheim [id number]-50 Department of Health and [employer] Centers for Disease Control and Prevention Office Use Only Introducing [facility]! Dear Bebe Congress: Thank you for requesting a 591wed account. Our records indicate that you already have an active 591wed account. You can access your account anytime at [email or web address]. [email or web address] Did you know that you can access your hospital and ER discharge instructions at any time in 591wed? You can also review all of your test results from your hospital stay or ER visit. Additional Information If you have questions, please visit the Frequently Asked Questions section of the 591wed website at [email or web address] Remember, 591wed is NOT to be used for urgent needs. For medical emergencies, dial 911. Now available from your iPhone and Android! Please provide this summary of care documentation to your next provider. Your primary care clinician is listed as Ronald Mckee. If you have any questions after today's visit, please call [phone].

## 2018-09-24 NOTE — PROGRESS NOTES
Chief Complaint Patient presents with  Coronary Artery Disease 6 month follow up 1. Have you been to the ER, urgent care clinic since your last visit? Hospitalized since your last visit? No 
 
2. Have you seen or consulted any other health care providers outside of the [address] [place] [address] since your last visit? Include any pap smears or colon screening. Peggy Marinelli is a [de-identified] y.o. male who presents for routine immunizations. He denies any symptoms , reactions or allergies that would exclude them from being immunized today. Risks and adverse reactions were discussed and the VIS was given to them. All questions were addressed. He was observed for 20 min post injection. There were no reactions observed.  
 
Charli Ross LPN

## 2018-09-26 LAB
APO B SERPL-MCNC: 67 MG/DL (ref 52–135)
BUN SERPL-MCNC: 12 MG/DL (ref 8–27)
BUN/CREAT SERPL: 8 (ref 10–24)
CALCIUM SERPL-MCNC: 9.6 MG/DL (ref 8.6–10.2)
CHLORIDE SERPL-SCNC: 105 MMOL/L (ref 96–106)
CHOLEST SERPL-MCNC: 104 MG/DL (ref 100–199)
CO2 SERPL-SCNC: 23 MMOL/L (ref 20–29)
CREAT SERPL-MCNC: 1.42 MG/DL (ref 0.76–1.27)
CYSTATIN C SERPL-MCNC: 1.08 MG/L (ref 0.53–0.95)
GLUCOSE SERPL-MCNC: 79 MG/DL (ref 65–99)
HDLC SERPL-MCNC: 42 MG/DL
LDLC SERPL CALC-MCNC: 53 MG/DL (ref 0–99)
POTASSIUM SERPL-SCNC: 5.7 MMOL/L (ref 3.5–5.2)
SODIUM SERPL-SCNC: 144 MMOL/L (ref 134–144)
TRIGL SERPL-MCNC: 47 MG/DL (ref 0–149)
VLDLC SERPL CALC-MCNC: 9 MG/DL (ref 5–40)

## 2018-09-29 PROBLEM — I25.10 ASHD (ARTERIOSCLEROTIC HEART DISEASE): Status: ACTIVE | Noted: 2018-09-29

## 2018-09-29 NOTE — PROGRESS NOTES
[facility] 
[place] [address] 
[address] [facility] [address] Phone:  [phone]  Fax: [phone] Chief Complaint Patient presents with  Coronary Artery Disease 6 month follow up Hammad Neil SUBJECTIVE: 
  Francois Fatima is a [de-identified] y.o. male Comes in for return visit accompanied by his wife today. Primary reason she came in was because concern about worsening problem with short term memory. When I asked him about it previously in different ways he thought it was of no significance. He is not doing any formal work at this point. The only thing he does outside of the house is attend sign language classes. He continues to drive independently. I ask him specifically if there were ever times if he got lost and surprisingly he said yes, on occasion, but it was always short lived and never ending up with someone having to be called or ask for directions. I tried different things as far as my questioning to attempt to bring out memory problems, but I was unsuccessful. He's lost a few more pounds. He's noted blood on his shorts on one occasion also. He's noted no hematuria. He's not had any  symptoms either. Current Outpatient Prescriptions Medication Sig Dispense Refill  pravastatin (PRAVACHOL) 80 mg tablet TAKE 1 TABLET BY MOUTH AT BEDTIME 90 Tab 3  Dutasteride-Tamsulosin 0.5-0.4 mg CM24 TAKE ONE CAPSULE BY MOUTH EVERY DAY 90 Cap 3  
 bimatoprost (LUMIGAN) 0.01 % ophthalmic drops Administer 1 Drop to both eyes nightly.  brinzolamide-brimonidine (SIMBRINZA) 1-0.2 % drps Apply 1 Drop to eye two (2) times a day.  Artifi. Tear, Hypromellose,-PF (RETAINE HPMC) 0.3 % drop Apply 1 Drop to eye two (2) times a day.  salsalate (DISALCID) 500 mg tablet TAKE 1 TAB BY MOUTH TWO (2) TIMES A DAY. 180 Tab 3  carvedilol (COREG) 3.125 mg tablet   12  
 nitroglycerin (NITROSTAT) 0.4 mg SL tablet 0.4 mg by SubLINGual route every five (5) minutes as needed for Chest Pain.  lisinopril (PRINIVIL, ZESTRIL) 10 mg tablet Take  by mouth daily.  fish oil-omega-3 fatty acids 340-1,000 mg capsule Take 2 Caps by mouth daily.  multivitamin,tx-iron-ca-min (THERA-M W/ IRON) 27-0.4 mg tab Take 1 Tab by mouth daily.  latanoprost (XALATAN) 0.005 % ophthalmic solution Administer 1 Drop to both eyes nightly.  HYDROcodone-acetaminophen (NORCO) 5-325 mg per tablet Take 1 Tab by mouth every six (6) hours as needed for Pain. Max Daily Amount: 4 Tabs. 10 Tab 0  
 hydrOXYzine (ATARAX) 10 mg tablet Take 10 mg by mouth three (3) times daily as needed for Itching. Past Medical History:  
Diagnosis Date  Arthritis   
 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  
 CAD (coronary artery disease)  Chronic kidney disease   
 polycystic kidney disease  Chronic pain   
 left foot Past Surgical History:  
Procedure Laterality Date  CABG, ARTERY-VEIN, THREE  4/18/2007  HX COLONOSCOPY    
 HX ORTHOPAEDIC    
 bilateral bunionectomy  HX PACEMAKER    
 VASCULAR SURGERY PROCEDURE UNLIST Allergies Allergen Reactions  Clopidogrel Other (comments) bradycardia REVIEW OF SYSTEMS: 
General: negative for - chills or fever ENT: negative for - headaches, nasal congestion or tinnitus Respiratory: negative for - cough, hemoptysis, shortness of breath or wheezing Cardiovascular : negative for - chest pain, edema, palpitations or shortness of breath Gastrointestinal: negative for - abdominal pain, blood in stools, heartburn or nausea/vomiting Genito-Urinary: no dysuria, trouble voiding, or hematuria Musculoskeletal: negative for - gait disturbance, joint pain, joint stiffness or joint swelling Neurological: no TIA or stroke symptoms Hematologic: no bruises, no bleeding, no swollen glands Integument: no lumps, mole changes, nail changes or rash Endocrine: no malaise/lethargy or unexpected weight changes Social History Social History  Marital status:  Spouse name: Hernan Jones  Number of children: 3  
 Years of education: 25 Occupational History  Retired [employer] Social History Main Topics  Smoking status: Never Smoker  Smokeless tobacco: Never Used  Alcohol use No  
   Comment: quit 12/10/1980  Drug use: No  
 Sexual activity: Yes  
  Partners: Female Other Topics Concern  None Social History Narrative Family History Problem Relation Age of Onset  No Known Problems Mother OBJECTIVE: 
 
Visit Vitals  /86  Pulse (!) 59  Temp 97.8 °F (36.6 °C) (Oral)  Resp 16  
 Ht 6' 1\" (1.854 m)  Wt 179 lb 8 oz (81.4 kg)  SpO2 97%  BMI 23.68 kg/m2 CONSTITUTIONAL: well , well nourished, appears age appropriate EYES: perrla, eom intact ENMT:moist mucous membranes, pharynx clear NECK: supple. Thyroid normal 
RESPIRATORY: Chest: clear to ascultation and percussion CARDIOVASCULAR: Heart: regular rate and rhythm GASTROINTESTINAL: Abdomen: soft, bowel sounds active HEMATOLOGIC: no pathological lymph nodes palpated MUSCULOSKELETAL: Extremities: no edema, pulse 1+ INTEGUMENT: No unusual rashes or suspicious skin lesions noted. Nails appear normal. 
NEUROLOGIC: non-focal exam  
MENTAL STATUS: alert and oriented, appropriate affect ASSESSMENT: 
1. Memory deficit 2. Essential hypertension 3. Renal azotemia 4. Dyslipidemia 5. ASHD (arteriosclerotic heart disease) 6. Encounter for immunization PLAN: 
 
1. As far as his memory deficits are concerned, I think it is indeed pathological.  Unfortunately he doesn't want anything done. I offer neurological appointment. This is not surprising because at this point he doesn't feel there is a real problem. 2. BP is excellent today, no adjustments are made. 3. He's had no suggestions of ischemic symptoms from a cardiac perspective. 4. He does have a history of mild renal insufficiency and BMP will be checked today. 5. He remains on a statin and secondary cardiovascular risk prevention. 6. I encouraged him to remain as physically and mentally active as possible. 7. Urinalysis will be checked to ensure he's not having any hematuria, which I doubt. . 
Orders Placed This Encounter  [place] IMMUNIZ ADMIN,1 SINGLE/COMB VAC/TOXOID  Influenza virus vaccine (FLUZONE HIGH DOSE) 65 years and older (14742)  LIPID PANEL  APOLIPOPROTEIN B  
 METABOLIC PANEL, BASIC  
 CYSTATIN C Follow-up Disposition: 
Return in about 4 months (around 1/24/2019).  
 
 
Freeman Adair MD

## 2018-09-30 NOTE — PROGRESS NOTES
Remind patient and wife of the importance of reducing his potassium intake. He should have all the paperwork regarding this.

## 2018-10-02 ENCOUNTER — TELEPHONE (OUTPATIENT)
Dept: INTERNAL MEDICINE CLINIC | Age: 78
End: 2018-10-02

## 2018-10-02 NOTE — TELEPHONE ENCOUNTER
Spoke with patient wife and ask that she make sure to decrease his intake of high potassium foods, and she should have the paperwork with that listed.

## 2019-01-21 ENCOUNTER — OFFICE VISIT (OUTPATIENT)
Dept: INTERNAL MEDICINE CLINIC | Age: 79
End: 2019-01-21

## 2019-01-21 VITALS
TEMPERATURE: 97.8 F | OXYGEN SATURATION: 97 % | HEART RATE: 69 BPM | WEIGHT: 181.7 LBS | RESPIRATION RATE: 16 BRPM | SYSTOLIC BLOOD PRESSURE: 164 MMHG | DIASTOLIC BLOOD PRESSURE: 80 MMHG | HEIGHT: 73 IN | BODY MASS INDEX: 24.08 KG/M2

## 2019-01-21 DIAGNOSIS — M45.9 ANKYLOSING SPONDYLITIS, UNSPECIFIED SITE OF SPINE (HCC): ICD-10-CM

## 2019-01-21 DIAGNOSIS — I25.5 CARDIOMYOPATHY, ISCHEMIC: ICD-10-CM

## 2019-01-21 DIAGNOSIS — N40.0 BENIGN PROSTATIC HYPERPLASIA WITHOUT LOWER URINARY TRACT SYMPTOMS: ICD-10-CM

## 2019-01-21 DIAGNOSIS — I10 ESSENTIAL HYPERTENSION: ICD-10-CM

## 2019-01-21 DIAGNOSIS — R41.3 MEMORY DEFICIT: Primary | ICD-10-CM

## 2019-01-21 DIAGNOSIS — R79.89 RENAL AZOTEMIA: ICD-10-CM

## 2019-01-21 DIAGNOSIS — E78.5 DYSLIPIDEMIA: ICD-10-CM

## 2019-01-21 DIAGNOSIS — N40.0 PROSTATISM: ICD-10-CM

## 2019-01-21 RX ORDER — LATANOPROSTENE BUNOD 0.24 MG/ML
1 SOLUTION/ DROPS OPHTHALMIC
COMMUNITY
Start: 2018-12-26

## 2019-01-21 NOTE — PROGRESS NOTES
Chief Complaint Patient presents with  Memory Loss 4 month follow up 1. Have you been to the ER, urgent care clinic since your last visit? Hospitalized since your last visit? Yes When: December 2018 [facility] Reason for visit: cold sympotoms 2. Have you seen or consulted any other health care providers outside of the [address] [place] [address] since your last visit? Include any pap smears or colon screening.  No

## 2019-01-21 NOTE — PROGRESS NOTES
[facility] 
[place] [address] 
[address] [place] [address] Phone:  [phone]  Fax: [phone] Chief Complaint Patient presents with  Memory Loss 4 month follow up Mario Alberto Barrett SUBJECTIVE: 
  Darlen Gosselin is a [de-identified] y.o. male Comes in for return visit stating that he is doing well. I asked him about his memory and he minimizes it, stating he is able to do his day to day activities without any problems. He does not have problems getting lost either. Obviously I do not know the extent of it, but what he is telling me suggests a continued functional status. He denies chest pain or shortness of breath. He sees his cardiologist twice a year. His defibrillator is checked once every three months. He does have a history of mild renal insufficiency and the numbers have remained reasonably stable. He has a past history of primary hypertension and dyslipidemia. He remains reasonably physically active. I failed to mention he complains of paresthesias and numbness of his distal left leg, which is intermittent. Current Outpatient Medications Medication Sig Dispense Refill  VYZULTA 0.024 % drop Apply 1 Drop to eye nightly.  pravastatin (PRAVACHOL) 80 mg tablet TAKE 1 TABLET BY MOUTH AT BEDTIME 90 Tab 3  Dutasteride-Tamsulosin 0.5-0.4 mg CM24 TAKE ONE CAPSULE BY MOUTH EVERY DAY 90 Cap 3  
 bimatoprost (LUMIGAN) 0.01 % ophthalmic drops Administer 1 Drop to both eyes nightly.  brinzolamide-brimonidine (SIMBRINZA) 1-0.2 % drps Apply 1 Drop to eye two (2) times a day.  Artifi. Tear, Hypromellose,-PF (RETAINE HPMC) 0.3 % drop Apply 1 Drop to eye two (2) times a day.  salsalate (DISALCID) 500 mg tablet TAKE 1 TAB BY MOUTH TWO (2) TIMES A DAY. 180 Tab 3  
 latanoprost (XALATAN) 0.005 % ophthalmic solution Administer 1 Drop to both eyes nightly.     
 carvedilol (COREG) 3.125 mg tablet   12  
  HYDROcodone-acetaminophen (NORCO) 5-325 mg per tablet Take 1 Tab by mouth every six (6) hours as needed for Pain. Max Daily Amount: 4 Tabs. 10 Tab 0  
 hydrOXYzine (ATARAX) 10 mg tablet Take 10 mg by mouth three (3) times daily as needed for Itching.  nitroglycerin (NITROSTAT) 0.4 mg SL tablet 0.4 mg by SubLINGual route every five (5) minutes as needed for Chest Pain.  lisinopril (PRINIVIL, ZESTRIL) 10 mg tablet Take  by mouth daily.  fish oil-omega-3 fatty acids 340-1,000 mg capsule Take 2 Caps by mouth daily.  multivitamin,tx-iron-ca-min (THERA-M W/ IRON) 27-0.4 mg tab Take 1 Tab by mouth daily. Past Medical History:  
Diagnosis Date  Arthritis   
 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  
 CAD (coronary artery disease)  Chronic kidney disease   
 polycystic kidney disease  Chronic pain   
 left foot Past Surgical History:  
Procedure Laterality Date  CABG, ARTERY-VEIN, THREE  4/18/2007  HX COLONOSCOPY    
 HX ORTHOPAEDIC    
 bilateral bunionectomy  HX PACEMAKER    
 VASCULAR SURGERY PROCEDURE UNLIST Allergies Allergen Reactions  Clopidogrel Other (comments) bradycardia REVIEW OF SYSTEMS: 
General: negative for - chills or fever ENT: negative for - headaches, nasal congestion or tinnitus Respiratory: negative for - cough, hemoptysis, shortness of breath or wheezing Cardiovascular : negative for - chest pain, edema, palpitations or shortness of breath Gastrointestinal: negative for - abdominal pain, blood in stools, heartburn or nausea/vomiting Genito-Urinary: no dysuria, trouble voiding, or hematuria Musculoskeletal: negative for - gait disturbance, joint pain, joint stiffness or joint swelling Neurological: no TIA or stroke symptoms Hematologic: no bruises, no bleeding, no swollen glands Integument: no lumps, mole changes, nail changes or rash Endocrine: no malaise/lethargy or unexpected weight changes Social History Socioeconomic History  Marital status:  Spouse name: Nani Baker  Number of children: 3  
 Years of education: 25  
 Highest education level: Not on file Occupational History  Occupation: Retired Employer: [employer] Tobacco Use  Smoking status: Never Smoker  Smokeless tobacco: Never Used Substance and Sexual Activity  Alcohol use: No  
  Comment: quit 12/10/1980  Drug use: No  
 Sexual activity: Yes  
  Partners: Female Family History Problem Relation Age of Onset  No Known Problems Mother OBJECTIVE: 
 
Visit Vitals /80 Pulse 69 Temp 97.8 °F (36.6 °C) (Oral) Resp 16 Ht 6' 1\" (1.854 m) Wt 181 lb 11.2 oz (82.4 kg) SpO2 97% BMI 23.97 kg/m² CONSTITUTIONAL: well , well nourished, appears age appropriate EYES: perrla, eom intact ENMT:moist mucous membranes, pharynx clear NECK: supple. Thyroid normal 
RESPIRATORY: Chest: clear to ascultation and percussion CARDIOVASCULAR: Heart: regular rate and rhythm GASTROINTESTINAL: Abdomen: soft, bowel sounds active HEMATOLOGIC: no pathological lymph nodes palpated MUSCULOSKELETAL: Extremities: no edema, pulse 1+ INTEGUMENT: No unusual rashes or suspicious skin lesions noted. Nails appear normal. 
NEUROLOGIC: non-focal exam  
MENTAL STATUS: alert and oriented, appropriate affect ASSESSMENT: 
1. Memory deficit 2. Dyslipidemia 3. Essential hypertension 4. Cardiomyopathy, ischemic 5. Ankylosing spondylitis, unspecified site of spine ([address] 6. Prostatism 7. Renal azotemia 8. Benign prostatic hyperplasia without lower urinary tract symptoms PLAN: 
 
1. As far as his memory deficit is concerned, observation for now. Without having a third party present to define the extent of his deficit it is difficult for me to make that determination. 2. He will continue statin as prescribed. 3. His blood pressure is excellent today, no adjustments are made. Specifically his blood pressure is 142/70. 
4. He is well compensated from a cardiac perspective. There are no overt signs of congestive heart failure. 5. His ankylosing spondylitis is stable. 6. He has renal azotemia and I will ensure these values regarding his renal function remain stable. 7. I encouraged him to remain as physically active as possible, continuing what he is currently doing. . 
Orders Placed This Encounter  APOLIPOPROTEIN B  
 CBC WITH AUTOMATED DIFF  
 LIPID PANEL  
 METABOLIC PANEL, COMPREHENSIVE  
 PROSTATE SPECIFIC AG  
 URINALYSIS W/ RFLX MICROSCOPIC  
 CYSTATIN C  
 VYZULTA 0.024 % drop Follow-up Disposition: 
Return in about 3 months (around 4/21/2019).  
 
 
Elisha Moritz, MD

## 2019-01-23 LAB
ALBUMIN SERPL-MCNC: 4.7 G/DL (ref 3.5–4.7)
ALBUMIN/GLOB SERPL: 1.9 {RATIO} (ref 1.2–2.2)
ALP SERPL-CCNC: 40 IU/L (ref 39–117)
ALT SERPL-CCNC: 20 IU/L (ref 0–44)
APO B SERPL-MCNC: 69 MG/DL (ref 52–135)
APPEARANCE UR: CLEAR
AST SERPL-CCNC: 32 IU/L (ref 0–40)
BASOPHILS # BLD AUTO: 0.1 X10E3/UL (ref 0–0.2)
BASOPHILS NFR BLD AUTO: 3 %
BILIRUB SERPL-MCNC: 1.5 MG/DL (ref 0–1.2)
BILIRUB UR QL STRIP: NEGATIVE
BUN SERPL-MCNC: 14 MG/DL (ref 8–27)
BUN/CREAT SERPL: 9 (ref 10–24)
CALCIUM SERPL-MCNC: 9.6 MG/DL (ref 8.6–10.2)
CHLORIDE SERPL-SCNC: 103 MMOL/L (ref 96–106)
CHOLEST SERPL-MCNC: 118 MG/DL (ref 100–199)
CO2 SERPL-SCNC: 27 MMOL/L (ref 20–29)
COLOR UR: YELLOW
CREAT SERPL-MCNC: 1.62 MG/DL (ref 0.76–1.27)
CYSTATIN C SERPL-MCNC: 1.14 MG/L (ref 0.53–0.95)
EOSINOPHIL # BLD AUTO: 0.3 X10E3/UL (ref 0–0.4)
EOSINOPHIL NFR BLD AUTO: 9 %
ERYTHROCYTE [DISTWIDTH] IN BLOOD BY AUTOMATED COUNT: 13.8 % (ref 12.3–15.4)
GLOBULIN SER CALC-MCNC: 2.5 G/DL (ref 1.5–4.5)
GLUCOSE SERPL-MCNC: 86 MG/DL (ref 65–99)
GLUCOSE UR QL: NEGATIVE
HCT VFR BLD AUTO: 48.2 % (ref 37.5–51)
HDLC SERPL-MCNC: 45 MG/DL
HGB BLD-MCNC: 14.9 G/DL (ref 13–17.7)
HGB UR QL STRIP: NEGATIVE
IMM GRANULOCYTES # BLD AUTO: 0 X10E3/UL (ref 0–0.1)
IMM GRANULOCYTES NFR BLD AUTO: 0 %
KETONES UR QL STRIP: NEGATIVE
LDLC SERPL CALC-MCNC: 64 MG/DL (ref 0–99)
LEUKOCYTE ESTERASE UR QL STRIP: NEGATIVE
LYMPHOCYTES # BLD AUTO: 1.5 X10E3/UL (ref 0.7–3.1)
LYMPHOCYTES NFR BLD AUTO: 50 %
MCH RBC QN AUTO: 28.5 PG (ref 26.6–33)
MCHC RBC AUTO-ENTMCNC: 30.9 G/DL (ref 31.5–35.7)
MCV RBC AUTO: 92 FL (ref 79–97)
MICRO URNS: NORMAL
MONOCYTES # BLD AUTO: 0.2 X10E3/UL (ref 0.1–0.9)
MONOCYTES NFR BLD AUTO: 6 %
MORPHOLOGY BLD-IMP: ABNORMAL
NEUTROPHILS # BLD AUTO: 0.9 X10E3/UL (ref 1.4–7)
NEUTROPHILS NFR BLD AUTO: 32 %
NITRITE UR QL STRIP: NEGATIVE
PH UR STRIP: 5.5 [PH] (ref 5–7.5)
PLATELET # BLD AUTO: 118 X10E3/UL (ref 150–379)
POTASSIUM SERPL-SCNC: 4.8 MMOL/L (ref 3.5–5.2)
PROT SERPL-MCNC: 7.2 G/DL (ref 6–8.5)
PROT UR QL STRIP: NORMAL
PSA SERPL-MCNC: 1.8 NG/ML (ref 0–4)
RBC # BLD AUTO: 5.23 X10E6/UL (ref 4.14–5.8)
SODIUM SERPL-SCNC: 144 MMOL/L (ref 134–144)
SP GR UR: 1.01 (ref 1–1.03)
TRIGL SERPL-MCNC: 47 MG/DL (ref 0–149)
UROBILINOGEN UR STRIP-MCNC: 0.2 MG/DL (ref 0.2–1)
VLDLC SERPL CALC-MCNC: 9 MG/DL (ref 5–40)
WBC # BLD AUTO: 2.9 X10E3/UL (ref 3.4–10.8)

## 2019-05-21 ENCOUNTER — OFFICE VISIT (OUTPATIENT)
Dept: INTERNAL MEDICINE CLINIC | Age: 79
End: 2019-05-21

## 2019-05-21 VITALS
DIASTOLIC BLOOD PRESSURE: 90 MMHG | SYSTOLIC BLOOD PRESSURE: 139 MMHG | HEIGHT: 73 IN | TEMPERATURE: 98.2 F | WEIGHT: 185.8 LBS | RESPIRATION RATE: 20 BRPM | BODY MASS INDEX: 24.63 KG/M2 | HEART RATE: 91 BPM | OXYGEN SATURATION: 97 %

## 2019-05-21 DIAGNOSIS — I10 ESSENTIAL HYPERTENSION: Primary | ICD-10-CM

## 2019-05-21 DIAGNOSIS — E78.5 DYSLIPIDEMIA: ICD-10-CM

## 2019-05-21 DIAGNOSIS — I25.84 CORONARY ARTERY DISEASE DUE TO CALCIFIED CORONARY LESION: ICD-10-CM

## 2019-05-21 DIAGNOSIS — M45.9 ANKYLOSING SPONDYLITIS, UNSPECIFIED SITE OF SPINE (HCC): ICD-10-CM

## 2019-05-21 DIAGNOSIS — N40.0 PROSTATISM: ICD-10-CM

## 2019-05-21 DIAGNOSIS — R79.89 RENAL AZOTEMIA: ICD-10-CM

## 2019-05-21 DIAGNOSIS — R97.20 ELEVATED PSA: ICD-10-CM

## 2019-05-21 DIAGNOSIS — I25.10 CORONARY ARTERY DISEASE DUE TO CALCIFIED CORONARY LESION: ICD-10-CM

## 2019-05-21 DIAGNOSIS — R41.3 MEMORY DEFICIT: ICD-10-CM

## 2019-05-21 RX ORDER — NITROGLYCERIN 0.4 MG/1
0.4 TABLET SUBLINGUAL
Qty: 25 TAB | Refills: 11 | Status: SHIPPED | OUTPATIENT
Start: 2019-05-21

## 2019-05-21 NOTE — PROGRESS NOTES
Chief Complaint   Patient presents with    Hypertension     follow up      1. Have you been to the ER, urgent care clinic since your last visit? Hospitalized since your last visit? No    2. Have you seen or consulted any other health care providers outside of the [address] [place] [address] since your last visit? Include any pap smears or colon screening.  No

## 2019-05-21 NOTE — PROGRESS NOTES
[address] [place] [address] and Primary Care  [place] [address]  [address] [place] [address]  Phone:  [phone]  Fax: [phone]       Chief Complaint   Patient presents with    Hypertension     follow up    . SUBJECTIVE:    Jelly Tapia is a 78 y.o. male Comes in for return visit stating that he has done reasonably well. He denies chest pain, shortness of breath or orthopnea. He has not required usage of nitroglycerin for years. No comment is made about his memory. He continues to have as a hobby sign language. He has a past history of primary hypertension, dyslipidemia, as well as mild renal insufficiency. Current Outpatient Medications   Medication Sig Dispense Refill    nitroglycerin (NITROSTAT) 0.4 mg SL tablet 1 Tab by SubLINGual route every five (5) minutes as needed for Chest Pain. Up to 3 doses. 25 Tab 11    salsalate (DISALCID) 500 mg tablet TAKE 1 TAB BY MOUTH TWO (2) TIMES A DAY. 180 Tab 0    VYZULTA 0.024 % drop Apply 1 Drop to eye nightly.  pravastatin (PRAVACHOL) 80 mg tablet TAKE 1 TABLET BY MOUTH AT BEDTIME 90 Tab 3    Dutasteride-Tamsulosin 0.5-0.4 mg CM24 TAKE ONE CAPSULE BY MOUTH EVERY DAY 90 Cap 3    carvedilol (COREG) 3.125 mg tablet   12    lisinopril (PRINIVIL, ZESTRIL) 10 mg tablet Take  by mouth daily.  fish oil-omega-3 fatty acids 340-1,000 mg capsule Take 2 Caps by mouth daily.  multivitamin,tx-iron-ca-min (THERA-M W/ IRON) 27-0.4 mg tab Take 1 Tab by mouth daily.  bimatoprost (LUMIGAN) 0.01 % ophthalmic drops Administer 1 Drop to both eyes nightly.  brinzolamide-brimonidine (SIMBRINZA) 1-0.2 % drps Apply 1 Drop to eye two (2) times a day.  Artifi. Tear, Hypromellose,-PF (RETAINE HPMC) 0.3 % drop Apply 1 Drop to eye two (2) times a day.  latanoprost (XALATAN) 0.005 % ophthalmic solution Administer 1 Drop to both eyes nightly.       HYDROcodone-acetaminophen (NORCO) 5-325 mg per tablet Take 1 Tab by mouth every six (6) hours as needed for Pain. Max Daily Amount: 4 Tabs. 10 Tab 0    hydrOXYzine (ATARAX) 10 mg tablet Take 10 mg by mouth three (3) times daily as needed for Itching.        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    HX COLONOSCOPY      HX ORTHOPAEDIC      bilateral bunionectomy    HX PACEMAKER      VASCULAR SURGERY PROCEDURE UNLIST       Allergies   Allergen Reactions    Clopidogrel Other (comments)     bradycardia         REVIEW OF SYSTEMS:  General: negative for - chills or fever  ENT: negative for - headaches, nasal congestion or tinnitus  Respiratory: negative for - cough, hemoptysis, shortness of breath or wheezing  Cardiovascular : negative for - chest pain, edema, palpitations or shortness of breath  Gastrointestinal: negative for - abdominal pain, blood in stools, heartburn or nausea/vomiting  Genito-Urinary: no dysuria, trouble voiding, or hematuria  Musculoskeletal: negative for - gait disturbance, joint pain, joint stiffness or joint swelling  Neurological: no TIA or stroke symptoms  Hematologic: no bruises, no bleeding, no swollen glands  Integument: no lumps, mole changes, nail changes or rash  Endocrine: no malaise/lethargy or unexpected weight changes      Social History     Socioeconomic History    Marital status:      Spouse name: Gudelia Berg    Number of children: 3    Years of education: 25    Highest education level: Not on file   Occupational History    Occupation: Retired     Employer: [address] [place] [address]   Tobacco Use    Smoking status: Never Smoker    Smokeless tobacco: Never Used   Substance and Sexual Activity    Alcohol use: No     Comment: quit 12/10/1980  Drug use: No    Sexual activity: Yes     Partners: Female     Family History   Problem Relation Age of Onset    No Known Problems Mother        OBJECTIVE:    Visit Vitals  /90   Pulse 91   Temp 98.2 °F (36.8 °C) (Oral)   Resp 20   Ht 6' 1\" (1.854 m)   Wt 185 lb 12.8 oz (84.3 kg)   SpO2 97%   BMI 24.51 kg/m²     CONSTITUTIONAL: well , well nourished, appears age appropriate  EYES: perrla, eom intact  ENMT:moist mucous membranes, pharynx clear  NECK: supple. Thyroid normal  RESPIRATORY: Chest: clear to ascultation and percussion   CARDIOVASCULAR: Heart: regular rate and rhythm  GASTROINTESTINAL: Abdomen: soft, bowel sounds active  HEMATOLOGIC: no pathological lymph nodes palpated  MUSCULOSKELETAL: Extremities: no edema, pulse 1+   INTEGUMENT: No unusual rashes or suspicious skin lesions noted. Nails appear normal.  NEUROLOGIC: non-focal exam   MENTAL STATUS: alert and oriented, appropriate affect      ASSESSMENT:  1. Essential hypertension    2. Coronary artery disease due to calcified coronary lesion    3. Prostatism    4. Ankylosing spondylitis, unspecified site of spine ([address]    5. Dyslipidemia    6. Renal azotemia    7. Memory deficit    8. Elevated PSA        PLAN:    1. BP is excellent today, no adjustments are made. 2. His CAD is also stable. He has no suggestive symptoms of cardiac ischemia. 3. He will continue his Dutasteride for his prostatism. He has no obstructive urinary symptoms currently. 4. His ankylosing spondylitis is stable. 5. He will continue statin as prescribed and efficacy and compliance will be assessed. 6. He has mild renal insufficiency. I will assess his renal function. This has been stable also. 7. His conversation today is quite appropriate. He demonstrates no memory deficits, but again my contact is limited. 8. His last PSA was about 0.8 units higher than his previous one. I will recheck that today to ensure this is not trending upward.     .  Orders Placed This Encounter    METABOLIC PANEL, BASIC    CYSTATIN C    PROSTATE SPECIFIC AG    nitroglycerin (NITROSTAT) 0.4 mg SL tablet         Follow-up and Dispositions    · Return in about 4 months (around 9/21/2019).            Constance Ochoa MD

## 2019-05-23 LAB
BUN SERPL-MCNC: 15 MG/DL (ref 8–27)
BUN/CREAT SERPL: 11 (ref 10–24)
CALCIUM SERPL-MCNC: 9.6 MG/DL (ref 8.6–10.2)
CHLORIDE SERPL-SCNC: 104 MMOL/L (ref 96–106)
CO2 SERPL-SCNC: 25 MMOL/L (ref 20–29)
CREAT SERPL-MCNC: 1.39 MG/DL (ref 0.76–1.27)
CYSTATIN C SERPL-MCNC: 1.25 MG/L (ref 0.62–1.16)
GLUCOSE SERPL-MCNC: 75 MG/DL (ref 65–99)
POTASSIUM SERPL-SCNC: 5.1 MMOL/L (ref 3.5–5.2)
PSA SERPL-MCNC: 0.9 NG/ML (ref 0–4)
SODIUM SERPL-SCNC: 146 MMOL/L (ref 134–144)

## 2019-08-17 RX ORDER — PRAVASTATIN SODIUM 80 MG/1
TABLET ORAL
Qty: 90 TAB | Refills: 3 | Status: SHIPPED | OUTPATIENT
Start: 2019-08-17 | End: 2020-07-31

## 2019-10-08 ENCOUNTER — OFFICE VISIT (OUTPATIENT)
Dept: INTERNAL MEDICINE CLINIC | Age: 79
End: 2019-10-08

## 2019-10-08 VITALS
HEIGHT: 73 IN | DIASTOLIC BLOOD PRESSURE: 89 MMHG | HEART RATE: 61 BPM | TEMPERATURE: 97.6 F | RESPIRATION RATE: 16 BRPM | WEIGHT: 181.2 LBS | OXYGEN SATURATION: 95 % | SYSTOLIC BLOOD PRESSURE: 148 MMHG | BODY MASS INDEX: 24.01 KG/M2

## 2019-10-08 DIAGNOSIS — I25.10 CORONARY ARTERY DISEASE DUE TO CALCIFIED CORONARY LESION: ICD-10-CM

## 2019-10-08 DIAGNOSIS — Z13.31 SCREENING FOR DEPRESSION: ICD-10-CM

## 2019-10-08 DIAGNOSIS — E78.5 DYSLIPIDEMIA: ICD-10-CM

## 2019-10-08 DIAGNOSIS — R41.3 MEMORY DEFICIT: ICD-10-CM

## 2019-10-08 DIAGNOSIS — I25.84 CORONARY ARTERY DISEASE DUE TO CALCIFIED CORONARY LESION: ICD-10-CM

## 2019-10-08 DIAGNOSIS — Z23 ENCOUNTER FOR IMMUNIZATION: ICD-10-CM

## 2019-10-08 DIAGNOSIS — I25.5 CARDIOMYOPATHY, ISCHEMIC: ICD-10-CM

## 2019-10-08 DIAGNOSIS — Z00.00 WELLNESS EXAMINATION: ICD-10-CM

## 2019-10-08 DIAGNOSIS — Z13.39 SCREENING FOR ALCOHOLISM: ICD-10-CM

## 2019-10-08 DIAGNOSIS — I10 ESSENTIAL HYPERTENSION: Primary | ICD-10-CM

## 2019-10-08 DIAGNOSIS — R79.89 RENAL AZOTEMIA: ICD-10-CM

## 2019-10-08 NOTE — PROGRESS NOTES
Chief Complaint   Patient presents with   [facility] Annual Wellness Visit     1. Have you been to the ER, urgent care clinic since your last visit? Hospitalized since your last visit? No    2. Have you seen or consulted any other health care providers outside of the [address] [place] [address] since your last visit? Include any pap smears or colon screening. No    After obtaining consent, and per orders of Dr. Gelacio Hidalgo, injection of Pneumococcal 13 given by Rosemarie Avilez LPN. Patient instructed to remain in clinic for 20 minutes afterwards, and to report any adverse reaction to me immediately.

## 2019-10-10 LAB
APO B SERPL-MCNC: 62 MG/DL
BUN SERPL-MCNC: 13 MG/DL (ref 8–27)
BUN/CREAT SERPL: 9 (ref 10–24)
CALCIUM SERPL-MCNC: 9.8 MG/DL (ref 8.6–10.2)
CHLORIDE SERPL-SCNC: 103 MMOL/L (ref 96–106)
CHOLEST SERPL-MCNC: 105 MG/DL (ref 100–199)
CO2 SERPL-SCNC: 24 MMOL/L (ref 20–29)
CREAT SERPL-MCNC: 1.52 MG/DL (ref 0.76–1.27)
CYSTATIN C SERPL-MCNC: 1.18 MG/L (ref 0.62–1.16)
GLUCOSE SERPL-MCNC: 86 MG/DL (ref 65–99)
HDLC SERPL-MCNC: 38 MG/DL
LDLC SERPL CALC-MCNC: 59 MG/DL (ref 0–99)
POTASSIUM SERPL-SCNC: 5.2 MMOL/L (ref 3.5–5.2)
SODIUM SERPL-SCNC: 145 MMOL/L (ref 134–144)
TRIGL SERPL-MCNC: 41 MG/DL (ref 0–149)
VLDLC SERPL CALC-MCNC: 8 MG/DL (ref 5–40)

## 2019-10-29 PROBLEM — N18.30 CKD (CHRONIC KIDNEY DISEASE) STAGE 3, GFR 30-59 ML/MIN (HCC): Status: ACTIVE | Noted: 2019-10-29

## 2019-10-30 NOTE — PATIENT INSTRUCTIONS
Medicare Wellness Visit, Male The best way to live healthy is to have a lifestyle where you eat a well-balanced diet, exercise regularly, limit alcohol use, and quit all forms of tobacco/nicotine, if applicable. Regular preventive services are another way to keep healthy. Preventive services (vaccines, screening tests, monitoring & exams) can help personalize your care plan, which helps you manage your own care. Screening tests can find health problems at the earliest stages, when they are easiest to treat. [facility] follows the current, evidence-based guidelines published by the [facility] ([facility]) when recommending preventive services for our patients. Because we follow these guidelines, sometimes recommendations change over time as research supports it. (For example, a prostate screening blood test is no longer routinely recommended for men with no symptoms). Of course, you and your doctor may decide to screen more often for some diseases, based on your risk and co-morbidities (chronic disease you are already diagnosed with). Preventive services for you include: - Medicare offers their members a free annual wellness visit, which is time for you and your primary care provider to discuss and plan for your preventive service needs. Take advantage of this benefit every year! 
-All adults over age 72 should receive the recommended pneumonia vaccines. Current USPSTF guidelines recommend a series of two vaccines for the best pneumonia protection.  
-All adults should have a flu vaccine yearly and tetanus vaccine every 10 years. 
-All adults age 48 and older should receive the shingles vaccines (series of two vaccines).       
-All adults age 38-68 who are overweight should have a diabetes screening test once every three years.  
-Other screening tests & preventive services for persons with diabetes include: an eye exam to screen for diabetic retinopathy, a kidney function test, a foot exam, and stricter control over your cholesterol.  
-Cardiovascular screening for adults with routine risk involves an electrocardiogram (ECG) at intervals determined by the provider.  
-Colorectal cancer screening should be done for adults age 54-65 with no increased risk factors for colorectal cancer. There are a number of acceptable methods of screening for this type of cancer. Each test has its own benefits and drawbacks. Discuss with your provider what is most appropriate for you during your annual wellness visit. The different tests include: colonoscopy (considered the best screening method), a fecal occult blood test, a fecal DNA test, and sigmoidoscopy. 
-All adults born between [facility] should be screened once for Hepatitis C. 
-An Abdominal Aortic Aneurysm (AAA) Screening is recommended for men age 73-68 who has ever smoked in their lifetime. Here is a list of your current Health Maintenance items (your personalized list of preventive services) with a due date: 
Health Maintenance Due Topic Date Due  Shingles Vaccine (1 of 2) 05/12/1988  Glaucoma Screening   05/20/2017  Flu Vaccine  08/01/2019

## 2019-10-30 NOTE — PROGRESS NOTES
[address] [facility]  [facility]  [address] [place] [address]  Phone:  [phone]  Fax: [phone]       Chief Complaint   Patient presents with   [facility]   . SUBJECTIVE:    Steven Caballero is a 78 y.o. male Comes in for return visit stating that he has done well. He has no major complaints. He is physically active, but not as he formerly had been previously. He has had a problem historically with his short term memory. He remains reasonably functional.  He continues to engage in sign language, which has let me know that his memory is not overwhelmingly bad. He continues to pay his bills and drive periodically. He has a history of coronary artery disease and he follows with his cardiologist at least twice a year. He has been totally asymptomatic with no suggestion of ischemia. He has a past history of primary hypertension, dyslipidemia and mild renal dysfunction. Current Outpatient Medications   Medication Sig Dispense Refill    pravastatin (PRAVACHOL) 80 mg tablet TAKE 1 TABLET BY MOUTH AT BEDTIME 90 Tab 3    salsalate (DISALCID) 500 mg tablet TAKE 1 TAB BY MOUTH TWO (2) TIMES A DAY. 180 Tab 3    nitroglycerin (NITROSTAT) 0.4 mg SL tablet 1 Tab by SubLINGual route every five (5) minutes as needed for Chest Pain. Up to 3 doses. 25 Tab 11    VYZULTA 0.024 % drop Apply 1 Drop to eye nightly.  Dutasteride-Tamsulosin 0.5-0.4 mg CM24 TAKE ONE CAPSULE BY MOUTH EVERY DAY 90 Cap 3    latanoprost (XALATAN) 0.005 % ophthalmic solution Administer 1 Drop to both eyes nightly.  carvedilol (COREG) 3.125 mg tablet Take 6.25 mg by mouth two (2) times a day. 12    lisinopril (PRINIVIL, ZESTRIL) 10 mg tablet Take  by mouth daily.  fish oil-omega-3 fatty acids 340-1,000 mg capsule Take 2 Caps by mouth daily.  multivitamin,tx-iron-ca-min (THERA-M W/ IRON) 27-0.4 mg tab Take 1 Tab by mouth daily.       bimatoprost (LUMIGAN) 0.01 % ophthalmic drops Administer 1 Drop to both eyes nightly.  brinzolamide-brimonidine (SIMBRINZA) 1-0.2 % drps Apply 1 Drop to eye two (2) times a day.  Artifi. Tear, Hypromellose,-PF (RETAINE HPMC) 0.3 % drop Apply 1 Drop to eye two (2) times a day.  HYDROcodone-acetaminophen (NORCO) 5-325 mg per tablet Take 1 Tab by mouth every six (6) hours as needed for Pain. Max Daily Amount: 4 Tabs. 10 Tab 0    hydrOXYzine (ATARAX) 10 mg tablet Take 10 mg by mouth three (3) times daily as needed for Itching.        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    HX COLONOSCOPY      HX ORTHOPAEDIC      bilateral bunionectomy    HX PACEMAKER      VASCULAR SURGERY PROCEDURE UNLIST       Allergies   Allergen Reactions    Clopidogrel Other (comments)     bradycardia         REVIEW OF SYSTEMS:  General: negative for - chills or fever  ENT: negative for - headaches, nasal congestion or tinnitus  Respiratory: negative for - cough, hemoptysis, shortness of breath or wheezing  Cardiovascular : negative for - chest pain, edema, palpitations or shortness of breath  Gastrointestinal: negative for - abdominal pain, blood in stools, heartburn or nausea/vomiting  Genito-Urinary: no dysuria, trouble voiding, or hematuria  Musculoskeletal: negative for - gait disturbance, joint pain, joint stiffness or joint swelling  Neurological: no TIA or stroke symptoms  Hematologic: no bruises, no bleeding, no swollen glands  Integument: no lumps, mole changes, nail changes or rash  Endocrine: no malaise/lethargy or unexpected weight changes      Social History     Socioeconomic History    Marital status:      Spouse name: Zafarzachery Asher Caty    Number of children: 3    Years of education: 25    Highest education level: Not on file   Occupational History    Occupation: Retired     Employer: [employer]   Tobacco Use    Smoking status: Never Smoker    Smokeless tobacco: Never Used   Substance and Sexual Activity    Alcohol use: No     Comment: quit 12/10/1980    Drug use: No    Sexual activity: Yes     Partners: Female     Family History   Problem Relation Age of Onset    No Known Problems Mother        OBJECTIVE:    Visit Vitals  /89   Pulse 61   Temp 97.6 °F (36.4 °C) (Oral)   Resp 16   Ht 6' 1\" (1.854 m)   Wt 181 lb 3.2 oz (82.2 kg)   SpO2 95%   BMI 23.91 kg/m²     CONSTITUTIONAL: well , well nourished, appears age appropriate  EYES: perrla, eom intact  ENMT:moist mucous membranes, pharynx clear  NECK: supple. Thyroid normal  RESPIRATORY: Chest: clear to ascultation and percussion   CARDIOVASCULAR: Heart: regular rate and rhythm  GASTROINTESTINAL: Abdomen: soft, bowel sounds active  HEMATOLOGIC: no pathological lymph nodes palpated  MUSCULOSKELETAL: Extremities: no edema, pulse 1+   INTEGUMENT: No unusual rashes or suspicious skin lesions noted. Nails appear normal.  NEUROLOGIC: non-focal exam   MENTAL STATUS: alert and oriented, appropriate affect      ASSESSMENT:  1. Essential hypertension    2. Coronary artery disease due to calcified coronary lesion    3. Cardiomyopathy, ischemic    4. Dyslipidemia    5. Renal azotemia    6. Memory deficit    7. Encounter for immunization    8. Screening for alcoholism    9. Screening for depression        PLAN:    1. His BP is excellent today, no adjustments are made. 2. He will follow up with his cardiologist regarding his ischemic disease. He has been asymptomatic. There are no overt signs of CHF. 3. He will continue statin in view of his secondary cardiovascular risk prevention status. 4. He has mild chronic renal failure with a mild RTA picture.   This has been stable. 5. His memory deficit is minimal at this point. His wife has not sent me any more notes regarding this. 6. I encouraged him to increase his physical activity on a more consistent basis. .  Orders Placed This Encounter    Depression Screen Annual    Pneumococcal Conjugate vaccine - 13 valent (50 years and older)    METABOLIC PANEL, BASIC    CYSTATIN C    APOLIPOPROTEIN B    LIPID PANEL         Follow-up and Dispositions    · Return in about 4 months (around 2/8/2020). Jeimy Lindsey MD  This is the Subsequent Medicare Annual Wellness Exam, performed 12 months or more after the Initial AWV or the last Subsequent AWV    I have reviewed the patient's medical history in detail and updated the computerized patient record. 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  180 Tab 3    nitroglycerin (NITROSTAT) 0.4 mg SL tablet 1 Tab by SubLINGual route every five (5) minutes as needed for Chest Pain. Up to 3 doses. 25 Tab 11    VYZULTA 0.024 % drop Apply 1 Drop to eye nightly.  Dutasteride-Tamsulosin 0.5-0.4 mg CM24 TAKE ONE CAPSULE BY MOUTH EVERY DAY 90 Cap 3    latanoprost (XALATAN) 0.005 % ophthalmic solution Administer 1 Drop to both eyes nightly.  carvedilol (COREG) 3.125 mg tablet Take 6.25 mg by mouth two (2) times a day. 12    lisinopril (PRINIVIL, ZESTRIL) 10 mg tablet Take  by mouth daily.  fish oil-omega-3 fatty acids 340-1,000 mg capsule Take 2 Caps by mouth daily.  multivitamin,tx-iron-ca-min (THERA-M W/ IRON) 27-0.4 mg tab Take 1 Tab by mouth daily.  bimatoprost (LUMIGAN) 0.01 % ophthalmic drops Administer 1 Drop to both eyes nightly.  brinzolamide-brimonidine (SIMBRINZA) 1-0.2 % drps Apply 1 Drop to eye two (2) times a day.  Artifi. Tear, Hypromellose,-PF (RETAINE HPMC) 0.3 % drop Apply 1 Drop to eye two (2) times a day.  HYDROcodone-acetaminophen (NORCO) 5-325 mg per tablet Take 1 Tab by mouth every six (6) hours as needed for Pain. Max Daily Amount: 4 Tabs. 10 Tab 0    hydrOXYzine (ATARAX) 10 mg tablet Take 10 mg by mouth three (3) times daily as needed for Itching. Allergies   Allergen Reactions    Clopidogrel Other (comments)     bradycardia       Family History   Problem Relation Age of Onset    No Known Problems Mother      Social History     Tobacco Use    Smoking status: Never Smoker    Smokeless tobacco: Never Used   Substance Use Topics    Alcohol use: No     Comment: quit 12/10/1980       Depression Risk Factor Screening:     3 most recent PHQ Screens 5/21/2019   PHQ Not Done -   Little interest or pleasure in doing things Not at all   Feeling down, depressed, irritable, or hopeless Not at all   Total Score PHQ 2 0       Alcohol Risk Factor Screening (MALE > 65):    Do you average more 1 drink per night or more than 7 drinks a week: No    In the past three months have you have had more than 4 drinks containing alcohol on one occasion: No      Functional Ability and Level of Safety:   Hearing: Hearing is good. Activities of Daily Living: The home contains: no safety equipment. Patient does total self care    Ambulation: with no difficulty    Fall Risk:  Fall Risk Assessment, last 12 mths 5/21/2019   Able to walk? Yes   Fall in past 12 months? No       Abuse Screen:  Patient is not abused    Cognitive Screening   Has your family/caregiver stated any concerns about your memory: yes - It has been historically stable of late  Cognitive Screening: Normal - MMSE (Mini Mental Status Exam)    Patient Care Team   Patient Care Team:  Audrey Still MD as PCP - General (Internal Medicine)  Audrey Still MD as PCP - [facility]    Assessment/Plan   Education and counseling provided:  Are appropriate based on today's review and evaluation    Diagnoses and all orders for this visit:    1. Essential hypertension  -     METABOLIC PANEL, BASIC  -     CYSTATIN C    2. Coronary artery disease due to calcified coronary lesion    3. Cardiomyopathy, ischemic    4. Dyslipidemia  -     APOLIPOPROTEIN B  -     LIPID PANEL    5. Renal azotemia  -     METABOLIC PANEL, BASIC  -     CYSTATIN C    6. Memory deficit    7. Encounter for immunization  -     PNEUMOCOCCAL CONJ VACCINE 13 VALENT IM  -     ADMIN PNEUMOCOCCAL VACCINE    8. Screening for alcoholism  -     VA ANNUAL ALCOHOL SCREEN 15 MIN    9.  Screening for depression  -     [place] Maintenance Due   Topic Date Due    Shingrix Vaccine Age 50> (1 of 2) 05/12/1988    GLAUCOMA SCREENING Q2Y  05/20/2017    Influenza Age 9 to Adult  08/01/2019

## 2020-01-13 ENCOUNTER — OFFICE VISIT (OUTPATIENT)
Dept: INTERNAL MEDICINE CLINIC | Age: 80
End: 2020-01-13

## 2020-01-13 VITALS
DIASTOLIC BLOOD PRESSURE: 86 MMHG | WEIGHT: 178.4 LBS | OXYGEN SATURATION: 98 % | HEIGHT: 73 IN | RESPIRATION RATE: 16 BRPM | HEART RATE: 74 BPM | SYSTOLIC BLOOD PRESSURE: 141 MMHG | TEMPERATURE: 97.7 F | BODY MASS INDEX: 23.64 KG/M2

## 2020-01-13 DIAGNOSIS — J18.9 PNEUMONITIS: ICD-10-CM

## 2020-01-13 DIAGNOSIS — M47.812 CERVICAL SPONDYLOSIS: ICD-10-CM

## 2020-01-13 DIAGNOSIS — M45.9 ANKYLOSING SPONDYLITIS, UNSPECIFIED SITE OF SPINE (HCC): ICD-10-CM

## 2020-01-13 DIAGNOSIS — R39.15 URINARY URGENCY: ICD-10-CM

## 2020-01-13 DIAGNOSIS — R41.3 MEMORY DEFICIT: ICD-10-CM

## 2020-01-13 DIAGNOSIS — I25.10 ASHD (ARTERIOSCLEROTIC HEART DISEASE): ICD-10-CM

## 2020-01-13 DIAGNOSIS — E78.5 DYSLIPIDEMIA: ICD-10-CM

## 2020-01-13 DIAGNOSIS — R26.9 GAIT DISTURBANCE: ICD-10-CM

## 2020-01-13 DIAGNOSIS — J45.20 MILD INTERMITTENT REACTIVE AIRWAY DISEASE WITHOUT COMPLICATION: Primary | ICD-10-CM

## 2020-01-13 NOTE — PROGRESS NOTES
Chief Complaint   Patient presents with   [address]     Patient states that he was admitted to a hospital in [place] for pneumonia (discharged 1/12/20). 1. Have you been to the ER, urgent care clinic since your last visit? Hospitalized since your last visit? Yes When: 1/3/2020 Where: [facility] ([place]) Reason for visit: pneumonia    2. Have you seen or consulted any other health care providers outside of the [address] [place] [address] since your last visit? Include any pap smears or colon screening.  No

## 2020-01-14 NOTE — PROGRESS NOTES
[address] [facility]  [place] [address]  [address] [place] [address]  Phone:  [phone]  Fax: [phone]       Chief Complaint   Patient presents with   [address]     Patient states that he was admitted to a hospital in [place] for pneumonia (discharged 1/12/20). .      SUBJECTIVE:    Caitlyn Watters is a 78 y.o. male Comes in accompanied by his wife. They went on vacation and ended up in [place], where he became ill. He developed a respiratory illness complicated by reactive airway disease and a possible pneumonia. He was treated aggressively and feels better now and has residual coughing, which is nonproductive. His short term memory continues to get worse and he is now willing to go to neurology. According to the wife, his balance is poor, although he has not fallen. She states this has been present for over a year and is getting worse. He denies any chest pain or shortness of breath. He has a past history of dyslipidemia and spondylitis. Current Outpatient Medications   Medication Sig Dispense Refill    pravastatin (PRAVACHOL) 80 mg tablet TAKE 1 TABLET BY MOUTH AT BEDTIME 90 Tab 3    salsalate (DISALCID) 500 mg tablet TAKE 1 TAB BY MOUTH TWO (2) TIMES A DAY. 180 Tab 3    nitroglycerin (NITROSTAT) 0.4 mg SL tablet 1 Tab by SubLINGual route every five (5) minutes as needed for Chest Pain. Up to 3 doses. 25 Tab 11    VYZULTA 0.024 % drop Apply 1 Drop to eye nightly.  Dutasteride-Tamsulosin 0.5-0.4 mg CM24 TAKE ONE CAPSULE BY MOUTH EVERY DAY 90 Cap 3    bimatoprost (LUMIGAN) 0.01 % ophthalmic drops Administer 1 Drop to both eyes nightly.  brinzolamide-brimonidine (SIMBRINZA) 1-0.2 % drps Apply 1 Drop to eye two (2) times a day.  Artifi. Tear, Hypromellose,-PF (RETAINE HPMC) 0.3 % drop Apply 1 Drop to eye two (2) times a day.  latanoprost (XALATAN) 0.005 % ophthalmic solution Administer 1 Drop to both eyes nightly.  carvedilol (COREG) 3.125 mg tablet Take 6.25 mg by mouth two (2) times a day. 12    HYDROcodone-acetaminophen (NORCO) 5-325 mg per tablet Take 1 Tab by mouth every six (6) hours as needed for Pain. Max Daily Amount: 4 Tabs. 10 Tab 0    hydrOXYzine (ATARAX) 10 mg tablet Take 10 mg by mouth three (3) times daily as needed for Itching.  lisinopril (PRINIVIL, ZESTRIL) 10 mg tablet Take  by mouth daily.  fish oil-omega-3 fatty acids 340-1,000 mg capsule Take 2 Caps by mouth daily.  multivitamin,tx-iron-ca-min (THERA-M W/ IRON) 27-0.4 mg tab Take 1 Tab by mouth daily.        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    HX COLONOSCOPY      HX ORTHOPAEDIC      bilateral bunionectomy    HX PACEMAKER      VASCULAR SURGERY PROCEDURE UNLIST       Allergies   Allergen Reactions    Clopidogrel Other (comments)     bradycardia         REVIEW OF SYSTEMS:  General: negative for - chills or fever  ENT: negative for - headaches, nasal congestion or tinnitus  Respiratory: negative for - cough, hemoptysis, shortness of breath or wheezing  Cardiovascular : negative for - chest pain, edema, palpitations or shortness of breath  Gastrointestinal: negative for - abdominal pain, blood in stools, heartburn or nausea/vomiting  Genito-Urinary: no dysuria, trouble voiding, or hematuria  Musculoskeletal: negative for - gait disturbance, joint pain, joint stiffness or joint swelling  Neurological: no TIA or stroke symptoms  Hematologic: no bruises, no bleeding, no swollen glands  Integument: no lumps, mole changes, nail changes or rash  Endocrine: no malaise/lethargy or unexpected weight changes      Social History Socioeconomic History    Marital status:      Spouse name: Minerva Bracket    Number of children: 3    Years of education: 25    Highest education level: Not on file   Occupational History    Occupation: Retired     Employer: [address] [place] [address] [employer]   Tobacco Use    Smoking status: Never Smoker    Smokeless tobacco: Never Used   Substance and Sexual Activity    Alcohol use: No     Comment: quit 12/10/1980    Drug use: No    Sexual activity: Yes     Partners: Female     Family History   Problem Relation Age of Onset    No Known Problems Mother        OBJECTIVE:    Visit Vitals  /86   Pulse 74   Temp 97.7 °F (36.5 °C) (Oral)   Resp 16   Ht 6' 1\" (1.854 m)   Wt 178 lb 6.4 oz (80.9 kg)   SpO2 98%   BMI 23.54 kg/m²     CONSTITUTIONAL: well , well nourished, appears age appropriate  EYES: perrla, eom intact  ENMT:moist mucous membranes, pharynx clear  NECK: supple. Thyroid normal  RESPIRATORY: Chest: clear to ascultation and percussion   CARDIOVASCULAR: Heart: regular rate and rhythm  GASTROINTESTINAL: Abdomen: soft, bowel sounds active  HEMATOLOGIC: no pathological lymph nodes palpated  MUSCULOSKELETAL: Extremities: no edema, pulse 1+   INTEGUMENT: No unusual rashes or suspicious skin lesions noted. Nails appear normal.  NEUROLOGIC: Unsteady gait, equivocally positive Romberg sign, no obvious cerebellar signs  MENTAL STATUS: alert and oriented, appropriate affect      ASSESSMENT:  1. Mild intermittent reactive airway disease without complication    2. Pneumonitis    3. Gait disturbance    4. Memory deficit    5. Cervical spondylosis    6. Ankylosing spondylitis, unspecified site of spine ([address]    7. Dyslipidemia    8. Urinary urgency    9. ASHD (arteriosclerotic heart disease)        PLAN:    1. The patient's pulmonary symptoms have completely resolved. This was probably initiated by a URI, which was complicated by reactive airway disease and a possible pneumonitis.   I cannot noe from the information given whether or not he did indeed have a pneumonitis. In any event, he was indeed treated with antibiotics. 2. His gait disturbance is rather impressive. According to the wife, it has been present now for the last year and getting worse. Fortunately, he has not fallen. MRI of the cervical spine and brain will be obtained. 3. He agrees to go see the neurologist regarding his presumed early dementia. His short term memory is getting progressively worse. 4. He has rather impressive cervical spondylosis, which was created by his burnt out spondylitis. 5. He remains on a statin in view of his secondary cardiovascular risk created by his coronary artery disease. 6. He also has a urinary urgency. This has gotten progressively worse over the last several months. He has no odor to his urine. 7. His coronary artery disease is stable. He has no chest pain or shortness of breath. .  Orders Placed This Encounter    CULTURE, URINE    URINALYSIS W/ RFLX MICROSCOPIC    REFERRAL TO NEUROLOGY         Follow-up and Dispositions    · Return in about 4 weeks (around 2/10/2020).            Yi New MD

## 2020-01-15 PROBLEM — R26.9 GAIT DISTURBANCE: Status: ACTIVE | Noted: 2020-01-15

## 2020-01-15 PROBLEM — R39.15 URINARY URGENCY: Status: ACTIVE | Noted: 2020-01-15

## 2020-01-15 PROBLEM — M47.812 CERVICAL SPONDYLOSIS: Status: ACTIVE | Noted: 2020-01-15

## 2020-01-17 ENCOUNTER — TELEPHONE (OUTPATIENT)
Dept: NEUROLOGY | Age: 80
End: 2020-01-17

## 2020-01-17 LAB
APPEARANCE UR: CLEAR
BACTERIA #/AREA URNS HPF: NORMAL /[HPF]
BACTERIA UR CULT: NO GROWTH
BILIRUB UR QL STRIP: NEGATIVE
CASTS URNS QL MICRO: NORMAL /LPF
COLOR UR: YELLOW
EPI CELLS #/AREA URNS HPF: NORMAL /HPF (ref 0–10)
GLUCOSE UR QL: NEGATIVE
HGB UR QL STRIP: NEGATIVE
KETONES UR QL STRIP: NEGATIVE
LEUKOCYTE ESTERASE UR QL STRIP: NEGATIVE
MICRO URNS: ABNORMAL
MUCOUS THREADS URNS QL MICRO: PRESENT
NITRITE UR QL STRIP: NEGATIVE
PH UR STRIP: 5.5 [PH] (ref 5–7.5)
PROT UR QL STRIP: ABNORMAL
RBC #/AREA URNS HPF: NORMAL /HPF (ref 0–2)
SP GR UR: 1.02 (ref 1–1.03)
UROBILINOGEN UR STRIP-MCNC: 0.2 MG/DL (ref 0.2–1)
WBC #/AREA URNS HPF: NORMAL /HPF (ref 0–5)

## 2020-01-17 NOTE — TELEPHONE ENCOUNTER
----- Message from Greer Pinon sent at 1/17/2020  1:42 PM EST -----  Regarding: Lyssa Ramirez MD/telephone  General Message/Vendor Calls    Caller's first and last name:   Glenys Mendoza     Reason for call: Pt's wife, Mrs. Gabriel Conner questioned if the pt's Internal Pacemaker Disipulator will hender his MRI performance. Please contact her and advise.        Callback required yes/no and why: Yes       Best contact number(s):[phone]      Details to clarify the request: N/A

## 2020-02-13 ENCOUNTER — OFFICE VISIT (OUTPATIENT)
Dept: INTERNAL MEDICINE CLINIC | Age: 80
End: 2020-02-13

## 2020-02-13 VITALS
OXYGEN SATURATION: 97 % | HEIGHT: 73 IN | WEIGHT: 181.7 LBS | HEART RATE: 88 BPM | SYSTOLIC BLOOD PRESSURE: 126 MMHG | TEMPERATURE: 97.6 F | RESPIRATION RATE: 16 BRPM | DIASTOLIC BLOOD PRESSURE: 82 MMHG | BODY MASS INDEX: 24.08 KG/M2

## 2020-02-13 DIAGNOSIS — R41.3 MEMORY DEFICIT: ICD-10-CM

## 2020-02-13 DIAGNOSIS — R25.2 CRAMP IN LIMB: ICD-10-CM

## 2020-02-13 DIAGNOSIS — M47.812 CERVICAL SPONDYLOSIS: ICD-10-CM

## 2020-02-13 DIAGNOSIS — N40.0 PROSTATISM: ICD-10-CM

## 2020-02-13 DIAGNOSIS — I25.84 CORONARY ARTERY DISEASE DUE TO CALCIFIED CORONARY LESION: ICD-10-CM

## 2020-02-13 DIAGNOSIS — I10 ESSENTIAL HYPERTENSION: Primary | ICD-10-CM

## 2020-02-13 DIAGNOSIS — R39.15 URINARY URGENCY: ICD-10-CM

## 2020-02-13 DIAGNOSIS — I25.10 CORONARY ARTERY DISEASE DUE TO CALCIFIED CORONARY LESION: ICD-10-CM

## 2020-02-13 DIAGNOSIS — R26.9 GAIT DISTURBANCE: ICD-10-CM

## 2020-02-13 DIAGNOSIS — E78.5 DYSLIPIDEMIA: ICD-10-CM

## 2020-02-13 DIAGNOSIS — M45.9 ANKYLOSING SPONDYLITIS, UNSPECIFIED SITE OF SPINE (HCC): ICD-10-CM

## 2020-02-13 NOTE — PROGRESS NOTES
Chief Complaint   Patient presents with    Heart Problem     routine follow up      1. Have you been to the ER, urgent care clinic since your last visit? Hospitalized since your last visit? No    2. Have you seen or consulted any other health care providers outside of the [address] [place] [address] since your last visit? Include any pap smears or colon screening.  No

## 2020-02-14 LAB
APO B SERPL-MCNC: 63 MG/DL
BUN SERPL-MCNC: 15 MG/DL (ref 8–27)
BUN/CREAT SERPL: 10 (ref 10–24)
CALCIUM SERPL-MCNC: 9.1 MG/DL (ref 8.6–10.2)
CHLORIDE SERPL-SCNC: 104 MMOL/L (ref 96–106)
CO2 SERPL-SCNC: 26 MMOL/L (ref 20–29)
CREAT SERPL-MCNC: 1.48 MG/DL (ref 0.76–1.27)
GLUCOSE SERPL-MCNC: 89 MG/DL (ref 65–99)
POTASSIUM SERPL-SCNC: 4.3 MMOL/L (ref 3.5–5.2)
SODIUM SERPL-SCNC: 144 MMOL/L (ref 134–144)

## 2020-02-15 NOTE — PROGRESS NOTES
[address] [facility]  [place] [address]  [address] [facility] [address]  Phone:  [phone]  Fax: [phone]       Chief Complaint   Patient presents with    Heart Problem     routine follow up    . SUBJECTIVE:    Stan Amin is a 78 y.o. male Comes in for follow up. He is accompanied by his wife today. He complains of increased urinary frequency accompanied by urgency. He has noted intermittent cramping in his legs when he stretches them too strenuously. He continues to have a gait disturbance, multifactorial in etiology. He has not yet seen his neurologist.    Finally, he does have history of coronary artery disease, rather extensive. He is asymptomatic from a cardiac perspective. Current Outpatient Medications   Medication Sig Dispense Refill    pravastatin (PRAVACHOL) 80 mg tablet TAKE 1 TABLET BY MOUTH AT BEDTIME 90 Tab 3    salsalate (DISALCID) 500 mg tablet TAKE 1 TAB BY MOUTH TWO (2) TIMES A DAY. 180 Tab 3    nitroglycerin (NITROSTAT) 0.4 mg SL tablet 1 Tab by SubLINGual route every five (5) minutes as needed for Chest Pain. Up to 3 doses. 25 Tab 11    VYZULTA 0.024 % drop Apply 1 Drop to eye nightly.  Dutasteride-Tamsulosin 0.5-0.4 mg CM24 TAKE ONE CAPSULE BY MOUTH EVERY DAY 90 Cap 3    bimatoprost (LUMIGAN) 0.01 % ophthalmic drops Administer 1 Drop to both eyes nightly.  brinzolamide-brimonidine (SIMBRINZA) 1-0.2 % drps Apply 1 Drop to eye two (2) times a day.  Artifi. Tear, Hypromellose,-PF (RETAINE HPMC) 0.3 % drop Apply 1 Drop to eye two (2) times a day.  latanoprost (XALATAN) 0.005 % ophthalmic solution Administer 1 Drop to both eyes nightly.  carvedilol (COREG) 3.125 mg tablet Take 6.25 mg by mouth two (2) times a day. 12    HYDROcodone-acetaminophen (NORCO) 5-325 mg per tablet Take 1 Tab by mouth every six (6) hours as needed for Pain. Max Daily Amount: 4 Tabs.  10 Tab 0    hydrOXYzine (ATARAX) 10 mg tablet Take 10 mg by mouth three (3) times daily as needed for Itching.  lisinopril (PRINIVIL, ZESTRIL) 10 mg tablet Take  by mouth daily.  fish oil-omega-3 fatty acids 340-1,000 mg capsule Take 2 Caps by mouth daily.  multivitamin,tx-iron-ca-min (THERA-M W/ IRON) 27-0.4 mg tab Take 1 Tab by mouth daily.        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    HX COLONOSCOPY      HX ORTHOPAEDIC      bilateral bunionectomy    HX PACEMAKER      VASCULAR SURGERY PROCEDURE UNLIST       Allergies   Allergen Reactions    Clopidogrel Other (comments)     bradycardia         REVIEW OF SYSTEMS:  General: negative for - chills or fever  ENT: negative for - headaches, nasal congestion or tinnitus  Respiratory: negative for - cough, hemoptysis, shortness of breath or wheezing  Cardiovascular : negative for - chest pain, edema, palpitations or shortness of breath  Gastrointestinal: negative for - abdominal pain, blood in stools, heartburn or nausea/vomiting  Genito-Urinary: no dysuria, trouble voiding, or hematuria  Musculoskeletal: negative for - gait disturbance, joint pain, joint stiffness or joint swelling  Neurological: no TIA or stroke symptoms  Hematologic: no bruises, no bleeding, no swollen glands  Integument: no lumps, mole changes, nail changes or rash  Endocrine: no malaise/lethargy or unexpected weight changes      Social History     Socioeconomic History    Marital status:      Spouse name: Boone Eric    Number of children: 3    Years of education: 25    Highest education level: Not on file   Occupational History    Occupation: Retired     Employer: [address] [place] [address]   Tobacco Use    Smoking status: Never Smoker    Smokeless tobacco: Never Used   Substance and Sexual Activity    Alcohol use: No     Comment: quit 12/10/1980    Drug use: No    Sexual activity: Yes     Partners: Female     Family History   Problem Relation Age of Onset    No Known Problems Mother        OBJECTIVE:    Visit Vitals  /82   Pulse 88   Temp 97.6 °F (36.4 °C) (Oral)   Resp 16   Ht 6' 1\" (1.854 m)   Wt 181 lb 11.2 oz (82.4 kg)   SpO2 97%   BMI 23.97 kg/m²     CONSTITUTIONAL: well , well nourished, appears age appropriate  EYES: perrla, eom intact  ENMT:moist mucous membranes, pharynx clear  NECK: supple. Thyroid normal  RESPIRATORY: Chest: clear to ascultation and percussion   CARDIOVASCULAR: Heart: regular rate and rhythm  GASTROINTESTINAL: Abdomen: soft, bowel sounds active  HEMATOLOGIC: no pathological lymph nodes palpated  MUSCULOSKELETAL: Extremities: no edema, pulse 1+   INTEGUMENT: No unusual rashes or suspicious skin lesions noted. Nails appear normal.  NEUROLOGIC: non-focal exam   MENTAL STATUS: alert and oriented, appropriate affect      ASSESSMENT:  1. Essential hypertension    2. Coronary artery disease due to calcified coronary lesion    3. Prostatism    4. Ankylosing spondylitis, unspecified site of spine ([facility]    5. Cervical spondylosis    6. Cramp in limb    7. Memory deficit    8. Gait disturbance    9. Dyslipidemia    10. Urinary urgency          PLAN:    1. The patient will follow up with a neurologist for the memory deficit, presumed related to early dementia. When he goes, his gait can be evaluated. He could have a cervical stenosis leading to a myelopathic process, because he does have significant spondylosis of his cervical spine related to his ankylosing spondylitis. I will leave the workup to the neurologist.  2. His blood pressure appears to be reasonable today. 3. He will continue statin as prescribed in view of his secondary cardiovascular risk prevention status.   4. His coronary artery disease is stable. He has no ischemic symptoms. 5. As far as the cramps in his legs are concerned, he just cannot contract his muscles for long periods of time, which will needless to say facilitate cramping. 6.   His urinary urgency has actually decreased as compared to the last visit. His urinalysis and urine culture were negative. Observation for now. .  Orders Placed This Encounter    METABOLIC PANEL, BASIC    APOLIPOPROTEIN B         Follow-up and Dispositions    · Return in about 3 months (around 5/13/2020).            Marylou Sol MD

## 2020-04-09 ENCOUNTER — VIRTUAL VISIT (OUTPATIENT)
Dept: NEUROLOGY | Age: 80
End: 2020-04-09

## 2020-04-09 DIAGNOSIS — G30.1 ALZHEIMER'S TYPE DEMENTIA WITH LATE ONSET WITHOUT BEHAVIORAL DISTURBANCE (HCC): ICD-10-CM

## 2020-04-09 DIAGNOSIS — R27.0 ATAXIA: ICD-10-CM

## 2020-04-09 DIAGNOSIS — E55.9 VITAMIN D DEFICIENCY: ICD-10-CM

## 2020-04-09 DIAGNOSIS — R26.9 GAIT DISTURBANCE: ICD-10-CM

## 2020-04-09 DIAGNOSIS — R41.82 ALTERED MENTAL STATUS, UNSPECIFIED ALTERED MENTAL STATUS TYPE: ICD-10-CM

## 2020-04-09 DIAGNOSIS — M45.9 ANKYLOSING SPONDYLITIS, UNSPECIFIED SITE OF SPINE (HCC): Primary | ICD-10-CM

## 2020-04-09 DIAGNOSIS — M48.02 DEGENERATIVE CERVICAL SPINAL STENOSIS: ICD-10-CM

## 2020-04-09 DIAGNOSIS — F02.80 ALZHEIMER'S TYPE DEMENTIA WITH LATE ONSET WITHOUT BEHAVIORAL DISTURBANCE (HCC): ICD-10-CM

## 2020-04-09 DIAGNOSIS — R41.3 MEMORY DEFICIT: ICD-10-CM

## 2020-04-09 DIAGNOSIS — I65.23 BILATERAL CAROTID ARTERY STENOSIS: ICD-10-CM

## 2020-04-09 DIAGNOSIS — M48.061 DEGENERATIVE LUMBAR SPINAL STENOSIS: ICD-10-CM

## 2020-04-09 DIAGNOSIS — M47.12 OSTEOARTHRITIS OF CERVICAL SPINE WITH MYELOPATHY AND RADICULOPATHY: ICD-10-CM

## 2020-04-09 DIAGNOSIS — R42 DIZZINESS: ICD-10-CM

## 2020-04-09 DIAGNOSIS — E53.8 B12 DEFICIENCY: ICD-10-CM

## 2020-04-09 DIAGNOSIS — M47.812 CERVICAL SPONDYLOSIS: ICD-10-CM

## 2020-04-09 DIAGNOSIS — E03.4 HYPOTHYROIDISM DUE TO ACQUIRED ATROPHY OF THYROID: ICD-10-CM

## 2020-04-09 DIAGNOSIS — M47.22 OSTEOARTHRITIS OF CERVICAL SPINE WITH MYELOPATHY AND RADICULOPATHY: ICD-10-CM

## 2020-04-09 DIAGNOSIS — R48.2 GAIT APRAXIA OF ELDERLY: ICD-10-CM

## 2020-04-09 RX ORDER — ASPIRIN 81 MG/1
TABLET ORAL DAILY
COMMUNITY

## 2020-04-09 RX ORDER — FUROSEMIDE 20 MG/1
20 TABLET ORAL DAILY
COMMUNITY

## 2020-04-09 RX ORDER — BRIMONIDINE TARTRATE 1 MG/ML
SOLUTION/ DROPS OPHTHALMIC
COMMUNITY
Start: 2020-01-19

## 2020-04-10 NOTE — PROGRESS NOTES
Consult    Yoni Mays is a 78 y.o. male who was seen by synchronous (real-time) audio-video technology on 4/9/2020. REFERRED BY:  Ana Levi MD    CHIEF COMPLAINT: Memory loss and unsteady gait and neck pain and back pain      Subjective:     Yoni Mays is a 78 y.o. right-handed -American male seen as a new patient to me, at the request of Dr. Kim Cantor for several neurological problems occurring recently. 1 is recent memory loss have been present for for 5 years, getting worse, mainly involving recent memory, with no family history of similar problems, and no precipitating event for this as far as headache, fever, trauma, toxin exposure, meningismus, stroke, or any other cause he can think of. He forgets people's names, he gets reports things, he gets what his wife tells him, and he no longer drives. It is slowly getting worse. He cannot get an MRI because he has a defibrillator that is a [employer] defibrillator with model number [id number] and serial [id number]. Patient had an MRI scan in 2011 lumbar spine it showed severe spinal stenosis at L4-5 level and moderate degenerative changes at other levels. Patient has restricted motion of the cervical spine and cannot move it well. Patient normally is able to walk without a cane or walker does not really tend to favor any 1 direction when he is having gait problems other than falling forward. He denies any bowel or bladder incontinence, or shuffling gait. He is not had any imaging of his head done and never had a history of stroke or TIA. He has not had any toxin exposure. He finished college and worked in several jobs. Has not had any recent B12, thyroid, or imaging of the brain done. Is never had a history of stroke or cerebrovascular disease. He never really smoked and he does not abuse alcohol or drugs. He normally stays fairly physically and mentally active.   He has had no recent or past trauma to his neck or spine or head. He is getting to the point that he is becoming somewhat disabled by his gait and his memory problems and having more difficulty doing his ADL activities. 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, type 1    CAD (coronary artery disease)     Chronic kidney disease     polycystic kidney disease    Chronic pain     left foot      Past Surgical History:   Procedure Laterality Date    CABG, ARTERY-VEIN, THREE  4/18/2007    HX COLONOSCOPY      HX ORTHOPAEDIC      bilateral bunionectomy    HX PACEMAKER      VASCULAR SURGERY PROCEDURE UNLIST       Family History   Problem Relation Age of Onset    No Known Problems Mother     No Known Problems Father     No Known Problems Sister     Asthma Son     Other Brother         old age      Social History     Tobacco Use    Smoking status: Never Smoker    Smokeless tobacco: Never Used   Substance Use Topics    Alcohol use: No     Comment: quit 12/10/1980         Current Outpatient Medications:     Alphagan P 0.1 % ophthalmic solution, , Disp: , Rfl:     GARLIC OIL PO, Take  by mouth., Disp: , Rfl:     aspirin delayed-release 81 mg tablet, Take  by mouth daily. , Disp: , Rfl:     furosemide (Lasix) 20 mg tablet, Take 20 mg by mouth daily. , Disp: , Rfl:     pravastatin (PRAVACHOL) 80 mg tablet, TAKE 1 TABLET BY MOUTH AT BEDTIME, Disp: 90 Tab, Rfl: 3    salsalate (DISALCID) 500 mg tablet, TAKE 1 TAB BY MOUTH TWO (2) TIMES A DAY., Disp: 180 Tab, Rfl: 3    nitroglycerin (NITROSTAT) 0.4 mg SL tablet, 1 Tab by SubLINGual route every five (5) minutes as needed for Chest Pain. Up to 3 doses. , Disp: 25 Tab, Rfl: 11    VYZULTA 0.024 % drop, Apply 1 Drop to eye nightly., Disp: , Rfl:     Dutasteride-Tamsulosin 0.5-0.4 mg CM24, TAKE ONE CAPSULE BY MOUTH EVERY DAY, Disp: 90 Cap, Rfl: 3    Artifi.  Tear, Hypromellose,-PF (RETAINE HPMC) 0.3 % drop, Apply 1 Drop to eye two (2) times a day., Disp: , Rfl:     carvedilol (COREG) 3.125 mg tablet, Take 6.25 mg by mouth two (2) times a day., Disp: , Rfl: 12    lisinopril (PRINIVIL, ZESTRIL) 10 mg tablet, Take  by mouth daily. , Disp: , Rfl:     fish oil-omega-3 fatty acids 340-1,000 mg capsule, Take 2 Caps by mouth daily. , Disp: , Rfl:     multivitamin,tx-iron-ca-min (THERA-M W/ IRON) 27-0.4 mg tab, Take 1 Tab by mouth daily. , Disp: , Rfl:         Allergies   Allergen Reactions    Clopidogrel Other (comments)     bradycardia      MRI Results (most recent):  No results found for this or any previous visit. No results found for this or any previous visit. Review of Systems:  Review of systems not obtained due to patient factors. There were no vitals filed for this visit. Objective:     I      NEUROLOGICAL EXAM:    Appearance: The patient is well developed, well nourished, provides a fair history and is in no acute distress. Mental Status: Oriented to time, place and person, and the president, patient remember only 1 of 3 words at 30 seconds with distraction, can do serial sevens with some difficulty, can spell the word world backwards, cognitive function is probably mildly abnormal and speech is fluent and no aphasia or dysarthria. Mood and affect appropriate. Cranial Nerves:   Intact visual fields. Fundi are not testable. HERLINDA, EOM's full, no nystagmus, no ptosis. Facial sensation is normal to touch. Corneal reflexes are not tested. Facial movement is symmetric. Hearing is abnormal bilaterally. Palate is midline with normal sternocleidomastoid and trapezius muscles are normal. Tongue is midline. Neck without meningismus   Temporal arteries are not tender or enlarged  TMJ areas are not tender on palpation   Motor:  4/5 strength in upper and lower proximal and distal muscles. Normal bulk and tone. No fasciculations.   Rapid alternating movement is symmetric and intact bilaterally   Reflexes:   Deep tendon reflexes, Babinski and clonus could not be tested   Sensory:   Normal to touch, pinprick and vibration and temperature and double simultaneous stimulation cannot be tested. Gait:  Abnormal gait with patient walking with a slightly wide-based gait and slight list to the left side from what looks like arthritis of his spine or scoliosis. .   Tremor:   No tremor noted. Cerebellar:   Mildly abnormal cerebellar signs present on Romberg and tandem testing and finger-nose-finger exam is unremarkable. Neurovascular:  Cardiac examination and carotid examination and peripheral arterial examination could not be tested           Assessment:       ICD-10-CM ICD-9-CM    1.  Ankyl[facility] spondyl[facility], [facility] M45.9 720.0 ILSA COMPREHENSIVE PLUS PANEL      ANTINEURONAL CELL AB      VITAMIN D, 25 HYDROXY      VITAMIN B12 & FOLATE      TSH 3RD GENERATION      SED RATE (ESR)      CBC WITH AUTOMATED DIFF      DUPLEX CAROTID BILATERAL      XR SPINE THORAC 3 V      XR SPINE LUMB MIN 4 V      XR SPINE CERV W OBL/FLEX/EXT MIN 6 V COMP      CT HEAD WO CONT      CT SPINE CERV WO CONT      CT SPINE LUMB WO CONT      REFERRAL TO NEUROPSYCHOLOGY   2. Memory deficit R41.3 780.93 ILSA COMPREHENSIVE PLUS PANEL      ANTINEURONAL CELL AB      VITAMIN D, 25 HYDROXY      VITAMIN B12 & FOLATE      TSH 3RD GENERATION      SED RATE (ESR)      CBC WITH AUTOMATED DIFF      DUPLEX CAROTID BILATERAL      XR SPINE THORAC 3 V      XR SPINE LUMB MIN 4 V      XR SPINE CERV W OBL/FLEX/EXT MIN 6 V COMP      CT HEAD WO CONT      CT SPINE CERV WO CONT      CT SPINE LUMB WO CONT      REFERRAL TO NEUROPSYCHOLOGY   3. Dizziness R42 780.4 ILSA COMPREHENSIVE PLUS PANEL      ANTINEURONAL CELL AB      VITAMIN D, 25 HYDROXY      VITAMIN B12 & FOLATE      TSH 3RD GENERATION      SED RATE (ESR)      CBC WITH AUTOMATED DIFF      DUPLEX CAROTID BILATERAL      XR SPINE THORAC 3 V      XR SPINE LUMB MIN 4 V      XR SPINE CERV W OBL/FLEX/EXT MIN 6 V COMP      CT HEAD WO CONT CT SPINE CERV WO CONT      CT SPINE LUMB WO CONT      REFERRAL TO NEUROPSYCHOLOGY   4. Gait disturbance R26.9 781.2 ILSA COMPREHENSIVE PLUS PANEL      ANTINEURONAL CELL AB      VITAMIN D, 25 HYDROXY      VITAMIN B12 & FOLATE      TSH 3RD GENERATION      SED RATE (ESR)      CBC WITH AUTOMATED DIFF      DUPLEX CAROTID BILATERAL      XR SPINE THORAC 3 V      XR SPINE LUMB MIN 4 V      XR SPINE CERV W OBL/FLEX/EXT MIN 6 V COMP      CT HEAD WO CONT      CT SPINE CERV WO CONT      CT SPINE LUMB WO CONT      REFERRAL TO NEUROPSYCHOLOGY   5. Cervical spondylosis M47.812 721.0 ILSA COMPREHENSIVE PLUS PANEL      ANTINEURONAL CELL AB      VITAMIN D, 25 HYDROXY      VITAMIN B12 & FOLATE      TSH 3RD GENERATION      SED RATE (ESR)      CBC WITH AUTOMATED DIFF      DUPLEX CAROTID BILATERAL      XR SPINE THORAC 3 V      XR SPINE LUMB MIN 4 V      XR SPINE CERV W OBL/FLEX/EXT MIN 6 V COMP      CT HEAD WO CONT      CT SPINE CERV WO CONT      CT SPINE LUMB WO CONT      REFERRAL TO NEUROPSYCHOLOGY   6. Bilateral carotid artery stenosis I65.23 433.10 ILSA COMPREHENSIVE PLUS PANEL     433.30 ANTINEURONAL CELL AB      VITAMIN D, 25 HYDROXY      VITAMIN B12 & FOLATE      TSH 3RD GENERATION      SED RATE (ESR)      CBC WITH AUTOMATED DIFF      DUPLEX CAROTID BILATERAL      XR SPINE THORAC 3 V      XR SPINE LUMB MIN 4 V      XR SPINE CERV W OBL/FLEX/EXT MIN 6 V COMP      CT HEAD WO CONT      CT SPINE CERV WO CONT      CT SPINE LUMB WO CONT      REFERRAL TO NEUROPSYCHOLOGY   7. Degenerative lumbar spinal stenosis M48.061 724.02 ILSA COMPREHENSIVE PLUS PANEL      ANTINEURONAL CELL AB      VITAMIN D, 25 HYDROXY      VITAMIN B12 & FOLATE      TSH 3RD GENERATION      SED RATE (ESR)      CBC WITH AUTOMATED DIFF      DUPLEX CAROTID BILATERAL      XR SPINE THORAC 3 V      XR SPINE LUMB MIN 4 V      XR SPINE CERV W OBL/FLEX/EXT MIN 6 V COMP      CT HEAD WO CONT      CT SPINE CERV WO CONT      CT SPINE LUMB WO CONT      REFERRAL TO NEUROPSYCHOLOGY   8. Degenerative cervical spinal stenosis M48.02 723.0 ILSA COMPREHENSIVE PLUS PANEL      ANTINEURONAL CELL AB      VITAMIN D, 25 HYDROXY      VITAMIN B12 & FOLATE      TSH 3RD GENERATION      SED RATE (ESR)      CBC WITH AUTOMATED DIFF      DUPLEX CAROTID BILATERAL      XR SPINE THORAC 3 V      XR SPINE LUMB MIN 4 V      XR SPINE CERV W OBL/FLEX/EXT MIN 6 V COMP      CT HEAD WO CONT      CT SPINE CERV WO CONT      CT SPINE LUMB WO CONT      REFERRAL TO NEUROPSYCHOLOGY   9.  Altered mental status, unspecified altered mental status type R41.82 780.97 ILSA COMPREHENSIVE PLUS PANEL      ANTINEURONAL CELL AB      VITAMIN D, 25 HYDROXY      VITAMIN B12 & FOLATE      TSH 3RD GENERATION      SED RATE (ESR)      CBC WITH AUTOMATED DIFF      DUPLEX CAROTID BILATERAL      XR SPINE THORAC 3 V      XR SPINE LUMB MIN 4 V      XR SPINE CERV W OBL/FLEX/EXT MIN 6 V COMP      CT HEAD WO CONT      CT SPINE CERV WO CONT      CT SPINE LUMB WO CONT      REFERRAL TO NEUROPSYCHOLOGY   10. Alzheimer's type dementia with late onset without behavioral disturbance (HCC) G30.1 331.0 ILSA COMPREHENSIVE PLUS PANEL    F02.80 294.10 ANTINEURONAL CELL AB      VITAMIN D, 25 HYDROXY      VITAMIN B12 & FOLATE      TSH 3RD GENERATION      SED RATE (ESR)      CBC WITH AUTOMATED DIFF      DUPLEX CAROTID BILATERAL      XR SPINE THORAC 3 V      XR SPINE LUMB MIN 4 V      XR SPINE CERV W OBL/FLEX/EXT MIN 6 V COMP      CT HEAD WO CONT      CT SPINE CERV WO CONT      CT SPINE LUMB WO CONT      REFERRAL TO NEUROPSYCHOLOGY   11. 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 Ataxia R27.0 781. 3 ILSA COMPREHENSIVE PLUS PANEL      ANTINEURONAL CELL AB      VITAMIN D, 25 HYDROXY      VITAMIN B12 & FOLATE      TSH 3RD GENERATION      SED RATE (ESR)      CBC WITH AUTOMATED DIFF      DUPLEX CAROTID BILATERAL      XR SPINE THORAC 3 V      XR SPINE LUMB MIN 4 V      XR SPINE CERV W OBL/FLEX/EXT MIN 6 V COMP      CT HEAD WO CONT      CT SPINE CERV WO CONT      CT SPINE LUMB WO CONT      REFERRAL TO NEUROPSYCHOLOGY   13. 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  B12 deficiency E53.8 266.2 ILSA COMPREHENSIVE PLUS PANEL      ANTINEURONAL CELL AB      VITAMIN D, 25 HYDROXY      VITAMIN B12 & FOLATE      TSH 3RD GENERATION      SED RATE (ESR)      CBC WITH AUTOMATED DIFF      DUPLEX CAROTID BILATERAL      XR SPINE THORAC 3 V      XR SPINE LUMB MIN 4 V      XR SPINE CERV W OBL/FLEX/EXT MIN 6 V COMP      CT HEAD WO CONT      CT SPINE CERV WO CONT      CT SPINE LUMB WO CONT      REFERRAL TO NEUROPSYCHOLOGY   15. Vitamin D deficiency E55.9 268.9 ILSA COMPREHENSIVE PLUS PANEL      ANTINEURONAL CELL AB      VITAMIN D, 25 HYDROXY      VITAMIN B12 & FOLATE      TSH 3RD GENERATION      SED RATE (ESR)      CBC WITH AUTOMATED DIFF      DUPLEX CAROTID BILATERAL      XR SPINE THORAC 3 V      XR SPINE LUMB MIN 4 V      XR SPINE CERV W OBL/FLEX/EXT MIN 6 V COMP      CT HEAD WO CONT      CT SPINE CERV WO CONT      CT SPINE LUMB WO CONT      REFERRAL TO NEUROPSYCHOLOGY   16. Hypothyroidism due to acquired atrophy of thyroid E03.4 244.8 ILSA COMPREHENSIVE PLUS PANEL     246.8 ANTINEURONAL CELL AB      VITAMIN D, 25 HYDROXY      VITAMIN B12 & FOLATE      TSH 3RD GENERATION      SED RATE (ESR)      CBC WITH AUTOMATED DIFF      DUPLEX CAROTID BILATERAL      XR SPINE THORAC 3 V      XR SPINE LUMB MIN 4 V      XR SPINE CERV W OBL/FLEX/EXT MIN 6 V COMP      CT HEAD WO CONT      CT SPINE CERV WO CONT      CT SPINE LUMB WO CONT      REFERRAL TO NEUROPSYCHOLOGY     Active Problems:    * No active hospital problems. *      Plan:     Patient with multiple problems, #1 during the memory loss, most likely this is probably a mild early dementia, and we will get neuropsych testing, and complete evaluation including CT the head and lab parameters to rule out treatable causes, he cannot get an MRI scan because of his pacemaker most likely we will call MRI and check  He is encouraged to stay mentally and physically active make sure he takes his vitamins and vitamin D on a regular basis. Second problem is unsteady gait, and numbness of his legs more due to his arthritis, he has a tendency to list slightly to the left from his scoliosis, and have recommended he get complete spine x-rays, and a CT of the cervical and lumbar spine to rule out treatable causes of his gait problem. We need to rule out myelopathy or cord compression. Patient will be referred to physical therapy if his work-up is negative and he has more the senile gait apraxia. We will follow him carefully, his arms are becoming disabled due to this gait and memory problems, we do need to rule out normal pressure hydrocephalus though that looks less likely on the basis of his exam.  Need to rule out multi-infarct dementia need to rule out tumor, or other congenital or structural lesions of the brain.   Discussed with the patient and his wife in detail, reviewed all his records on the chart, reviewed Dr. Scotty Montelongo notes, and we will continue to see him in 3 months time or earlier as need be, he will check my chart for results of his tests or call us immediately for the results. He will get neuropsych testing for memory loss also. Signed By: Emely Chavez MD     April 9, 2020         This note will not be viewable in [address] Marlo Llamas was seen by synchronous (real-time) audio-video technology on 04/09/20. Consent:  He and/or his healthcare decision maker is aware that this patient-initiated Telehealth encounter is a billable service, with coverage as determined by his insurance carrier. He is aware that he may receive a bill and has provided verbal consent to proceed: Yes    I was in the office while conducting this encounter. Pursuant to the emergency declaration under the [address] [place] [address], [facility] waiver authority and the [employer] Act, this Virtual  Visit was conducted, with patient's consent, to reduce the patient's risk of exposure to COVID-19 and provide continuity of care for an established patient. Services were provided through a video synchronous discussion virtually to substitute for in-person clinic visit. Pursuant to the emergency declaration under the [address] [place] [address], [facility] waiver authority and the [employer] Act, this Virtual  Visit was conducted, with patient's consent, to reduce the patient's risk of exposure to COVID-19 and provide continuity of care for an established patient. Services were provided through a video synchronous discussion virtually to substitute for in-person clinic visit.         CC: Dunia Diaz MD  FAX: [phone]

## 2020-05-14 ENCOUNTER — VIRTUAL VISIT (OUTPATIENT)
Dept: INTERNAL MEDICINE CLINIC | Age: 80
End: 2020-05-14

## 2020-05-14 DIAGNOSIS — I25.5 CARDIOMYOPATHY, ISCHEMIC: Primary | ICD-10-CM

## 2020-05-14 DIAGNOSIS — I10 ESSENTIAL HYPERTENSION: ICD-10-CM

## 2020-05-14 DIAGNOSIS — M47.22 OSTEOARTHRITIS OF CERVICAL SPINE WITH MYELOPATHY AND RADICULOPATHY: ICD-10-CM

## 2020-05-14 DIAGNOSIS — M47.12 OSTEOARTHRITIS OF CERVICAL SPINE WITH MYELOPATHY AND RADICULOPATHY: ICD-10-CM

## 2020-05-14 DIAGNOSIS — E78.5 DYSLIPIDEMIA: ICD-10-CM

## 2020-05-14 DIAGNOSIS — N40.0 PROSTATISM: ICD-10-CM

## 2020-05-14 DIAGNOSIS — R41.3 MEMORY DEFICIT: ICD-10-CM

## 2020-05-14 DIAGNOSIS — N18.30 CKD (CHRONIC KIDNEY DISEASE) STAGE 3, GFR 30-59 ML/MIN (HCC): ICD-10-CM

## 2020-05-14 NOTE — PROGRESS NOTES
Marty Escalona is a 78 y.o. male who was seen by synchronous (real-time) audio-video technology on 5/14/2020. Consent: Marty Escalona, who was seen by synchronous (real-time) audio-video technology, and/or his healthcare decision maker, is aware that this patient-initiated, Telehealth encounter on 5/14/2020 is a billable service, with coverage as determined by his insurance carrier. He is aware that he may receive a bill and has provided verbal consent to proceed: Yes. Assessment & Plan:   Diagnoses and all orders for this visit:    1. Cardiomyopathy, ischemic    2. Essential hypertension    3. 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 Prostatism    5. CKD (chronic kidney disease) stage 3, GFR 30-59 ml/min ([facility])    6. Dyslipidemia    7. Memory deficit      1. The patient has an ischemic cardiomyopathy which is quite stable. He remains on furosemide 20 mg daily which he has been on for the last year. Fortunately, no adverse effects from this. 2. He remains on his antihypertensive medication. The pressure has been excellent. 3. He has a history of ankylosing spondylitis resulting in mild cervical myelopathy and radiculopathy. These 2 symptoms are indeed reasonably stable, although his corey remains a bit unsteady. Fortunately, he has not fallen. 4. He remains on his finasteride and Flomax for his prostatism. He has no  symptoms currently. 5. He has chronic renal failure but this has historically been stable. Appropriate lab work will need to be drawn. 6. He is taking his statin and he has been secondary risk prevention mode. His last a ApoB level was acceptable. 7. He does have a progressive memory deficit. He is supposed to see a neurologist with his appointment being forthcoming. The question is raised whether or not he is developing dementia.               Subjective:   Marty Escalona is a 78 y.o. male who was seen for Hypertension (3 month follow up )  Patient is a generally he is doing well. He is not as physically active as he should be and he readily recognizes this. He does need to walk more and feels that he can do so. He has had problems with his gait presumably related to his cervical myelopathy from his ankylosing spondylitis and resulting changes leading to an element of spinal stenosis. Fortunately, he has not fallen. I encourage him to walk more. He follows up with Cardiology on a regular basis for his ischemic cardiomyopathy. He has no suggestive symptoms of decompensation. There is a past history of primary hypertension, dyslipidemia, as well as mild chronic renal failure. The latter has been stable with no progression. Regino Cooney He also has not followed up with Neurology for his memory deficit. He is encouraged to do so. Prior to Admission medications    Medication Sig Start Date End Date Taking? Authorizing Provider   Alphagan P 0.1 % ophthalmic solution  1/19/20  Yes Provider, Historical   GARLIC OIL PO Take  by mouth. Yes Provider, Historical   aspirin delayed-release 81 mg tablet Take  by mouth daily. Yes Provider, Historical   furosemide (Lasix) 20 mg tablet Take 20 mg by mouth daily. Yes Provider, Historical   pravastatin (PRAVACHOL) 80 mg tablet TAKE 1 TABLET BY MOUTH AT BEDTIME 8/17/19  Yes Chris Thakur MD   salsalate (DISALCID) 500 mg tablet TAKE 1 TAB BY MOUTH TWO (2) TIMES A DAY. 8/13/19  Yes Chris Thakur MD   nitroglycerin (NITROSTAT) 0.4 mg SL tablet 1 Tab by SubLINGual route every five (5) minutes as needed for Chest Pain. Up to 3 doses. 5/21/19  Yes Chris Thakur MD   VYZULTA 0.024 % drop Apply 1 Drop to eye nightly. 12/26/18  Yes Provider, Historical   Dutasteride-Tamsulosin 0.5-0.4 mg CM24 TAKE ONE CAPSULE BY MOUTH EVERY DAY 5/30/18  Yes Chris Thakur MD   Artifi. Tear, Hypromellose,-PF (RETAINE HPMC) 0.3 % drop Apply 1 Drop to eye two (2) times a day.    Yes Provider, Historical   carvedilol (COREG) 3.125 mg tablet Take 6.25 mg by mouth two (2) times a day. 8/4/15  Yes Provider, Historical   lisinopril (PRINIVIL, ZESTRIL) 10 mg tablet Take  by mouth daily. Yes Provider, Historical   fish oil-omega-3 fatty acids 340-1,000 mg capsule Take 2 Caps by mouth daily. Yes Provider, Historical   multivitamin,tx-iron-ca-min (THERA-M W/ IRON) 27-0.4 mg tab Take 1 Tab by mouth daily. Yes Provider, Historical     Allergies   Allergen Reactions    Clopidogrel Other (comments)     bradycardia       Current Outpatient Medications   Medication Sig Dispense Refill    Alphagan P 0.1 % ophthalmic solution       GARLIC OIL PO Take  by mouth.  aspirin delayed-release 81 mg tablet Take  by mouth daily.  furosemide (Lasix) 20 mg tablet Take 20 mg by mouth daily.  pravastatin (PRAVACHOL) 80 mg tablet TAKE 1 TABLET BY MOUTH AT BEDTIME 90 Tab 3    salsalate (DISALCID) 500 mg tablet TAKE 1 TAB BY MOUTH TWO (2) TIMES A DAY. 180 Tab 3    nitroglycerin (NITROSTAT) 0.4 mg SL tablet 1 Tab by SubLINGual route every five (5) minutes as needed for Chest Pain. Up to 3 doses. 25 Tab 11    VYZULTA 0.024 % drop Apply 1 Drop to eye nightly.  Dutasteride-Tamsulosin 0.5-0.4 mg CM24 TAKE ONE CAPSULE BY MOUTH EVERY DAY 90 Cap 3    Artifi. Tear, Hypromellose,-PF (RETAINE HPMC) 0.3 % drop Apply 1 Drop to eye two (2) times a day.  carvedilol (COREG) 3.125 mg tablet Take 6.25 mg by mouth two (2) times a day. 12    lisinopril (PRINIVIL, ZESTRIL) 10 mg tablet Take  by mouth daily.  fish oil-omega-3 fatty acids 340-1,000 mg capsule Take 2 Caps by mouth daily.  multivitamin,tx-iron-ca-min (THERA-M W/ IRON) 27-0.4 mg tab Take 1 Tab by mouth daily.        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 Objective:   Vital Signs: (As obtained by patient/caregiver at home)  There were no vitals taken for this visit.      [INSTRUCTIONS:  \"[x]\" Indicates a positive item  \"[]\" Indicates a negative item  -- DELETE ALL ITEMS NOT EXAMINED]    Constitutional: [x] Appears well-developed and well-nourished [x] No apparent distress      [] Abnormal -     Mental status: [x] Alert and awake  [x] Oriented to person/place/time [x] Able to follow commands    [] Abnormal -     Eyes:   EOM    [x]  Normal    [] Abnormal -   Sclera  [x]  Normal    [] Abnormal -          Discharge [x]  None visible   [] Abnormal -     HENT: [x] Normocephalic, atraumatic  [] Abnormal -   [x] Mouth/Throat: Mucous membranes are moist    External Ears [x] Normal  [] Abnormal -    Neck: [x] No visualized mass [] Abnormal -     Pulmonary/Chest: [x] Respiratory effort normal   [x] No visualized signs of difficulty breathing or respiratory distress        [] Abnormal -      Musculoskeletal:   [x] Normal gait with no signs of ataxia         [x] Normal range of motion of neck        [] Abnormal -     Neurological:        [x] No Facial Asymmetry (Cranial nerve 7 motor function) (limited exam due to video visit)          [x] No gaze palsy        [] Abnormal -          Skin:        [x] No significant exanthematous lesions or discoloration noted on facial skin         [] Abnormal -            Psychiatric:       [x] Normal Affect [] Abnormal -        [x] No Hallucinations    Other pertinent observable physical exam findings:-        We discussed the expected course, resolution and complications of the diagnosis(es) in detail. Medication risks, benefits, costs, interactions, and alternatives were discussed as indicated. I advised him to contact the office if his condition worsens, changes or fails to improve as anticipated. He expressed understanding with the diagnosis(es) and plan. Yoni Mays is a 78 y.o. male who was evaluated by a video visit encounter for concerns as above. Patient identification was verified prior to start of the visit. A caregiver was present when appropriate. Due to this being a TeleHealth encounter (During MSWYT-70 public health emergency), evaluation of the following organ systems was limited: Vitals/Constitutional/EENT/Resp/CV/GI//MS/Neuro/Skin/Heme-Lymph-Imm. Pursuant to the emergency declaration under the [facility], [facility] waiver authority and the [employer] and [employer] Act, this Virtual  Visit was conducted, with patient's (and/or legal guardian's) consent, to reduce the patient's risk of exposure to COVID-19 and provide necessary medical care. Services were provided through a video synchronous discussion virtually to substitute for in-person clinic visit. Patient and provider were located at their individual homes. Please note that this dictation was completed with [employer], the computer voice recognition software. Quite often unanticipated grammatical, syntax, homophones, and other interpretive errors are inadvertently transcribed by the computer software. Please disregard these errors. Please excuse any errors that have escaped final proofreading. Thank you.     Alpesh Arreola MD

## 2020-05-14 NOTE — PROGRESS NOTES
Chief Complaint   Patient presents with    Hypertension     3 month follow up      1. Have you been to the ER, urgent care clinic since your last visit? Hospitalized since your last visit? No    2. Have you seen or consulted any other health care providers outside of the [address] [place] [address] since your last visit? Include any pap smears or colon screening.  No

## 2020-05-18 ENCOUNTER — CLINICAL SUPPORT (OUTPATIENT)
Dept: INTERNAL MEDICINE CLINIC | Age: 80
End: 2020-05-18

## 2020-05-18 DIAGNOSIS — E78.5 DYSLIPIDEMIA: Primary | ICD-10-CM

## 2020-05-18 DIAGNOSIS — R79.89 RENAL AZOTEMIA: ICD-10-CM

## 2020-05-21 LAB
APO B SERPL-MCNC: 74 MG/DL
BUN SERPL-MCNC: 20 MG/DL (ref 8–27)
BUN/CREAT SERPL: 11 (ref 10–24)
CALCIUM SERPL-MCNC: 9.7 MG/DL (ref 8.6–10.2)
CHLORIDE SERPL-SCNC: 106 MMOL/L (ref 96–106)
CO2 SERPL-SCNC: 24 MMOL/L (ref 20–29)
CREAT SERPL-MCNC: 1.78 MG/DL (ref 0.76–1.27)
GLUCOSE SERPL-MCNC: 88 MG/DL (ref 65–99)
POTASSIUM SERPL-SCNC: 4.5 MMOL/L (ref 3.5–5.2)
SODIUM SERPL-SCNC: 145 MMOL/L (ref 134–144)

## 2020-05-25 RX ORDER — DUTASTERIDE AND TAMSULOSIN HYDROCHLORIDE CAPSULES .5; .4 MG/1; MG/1
CAPSULE ORAL
Qty: 90 CAP | Refills: 3 | Status: SHIPPED | OUTPATIENT
Start: 2020-05-25 | End: 2021-06-21 | Stop reason: SDUPTHER

## 2020-05-28 ENCOUNTER — APPOINTMENT (OUTPATIENT)
Dept: CT IMAGING | Age: 80
End: 2020-05-28
Attending: PSYCHIATRY & NEUROLOGY
Payer: MEDICARE

## 2020-05-28 ENCOUNTER — HOSPITAL ENCOUNTER (OUTPATIENT)
Dept: CT IMAGING | Age: 80
End: 2020-05-28
Attending: PSYCHIATRY & NEUROLOGY
Payer: MEDICARE

## 2020-05-28 ENCOUNTER — HOSPITAL ENCOUNTER (OUTPATIENT)
Dept: VASCULAR SURGERY | Age: 80
Discharge: HOME | End: 2020-05-28
Attending: PSYCHIATRY & NEUROLOGY
Payer: MEDICARE

## 2020-05-28 ENCOUNTER — HOSPITAL ENCOUNTER (OUTPATIENT)
Dept: CT IMAGING | Age: 80
Discharge: HOME | End: 2020-05-28
Attending: PSYCHIATRY & NEUROLOGY
Payer: MEDICARE

## 2020-05-28 ENCOUNTER — OFFICE VISIT (OUTPATIENT)
Dept: NEUROLOGY | Age: 80
End: 2020-05-28

## 2020-05-28 DIAGNOSIS — M48.061 DEGENERATIVE LUMBAR SPINAL STENOSIS: ICD-10-CM

## 2020-05-28 DIAGNOSIS — R41.3 MEMORY DEFICIT: ICD-10-CM

## 2020-05-28 DIAGNOSIS — R42 DIZZINESS: ICD-10-CM

## 2020-05-28 DIAGNOSIS — R27.0 ATAXIA: ICD-10-CM

## 2020-05-28 DIAGNOSIS — M45.9 ANKYLOSING SPONDYLITIS, UNSPECIFIED SITE OF SPINE (HCC): ICD-10-CM

## 2020-05-28 DIAGNOSIS — R48.2 GAIT APRAXIA OF ELDERLY: ICD-10-CM

## 2020-05-28 DIAGNOSIS — M47.12 OSTEOARTHRITIS OF CERVICAL SPINE WITH MYELOPATHY AND RADICULOPATHY: ICD-10-CM

## 2020-05-28 DIAGNOSIS — R26.9 GAIT DISTURBANCE: ICD-10-CM

## 2020-05-28 DIAGNOSIS — M47.22 OSTEOARTHRITIS OF CERVICAL SPINE WITH MYELOPATHY AND RADICULOPATHY: ICD-10-CM

## 2020-05-28 DIAGNOSIS — R41.3 MEMORY DEFICIT: Primary | ICD-10-CM

## 2020-05-28 DIAGNOSIS — R41.82 ALTERED MENTAL STATUS, UNSPECIFIED ALTERED MENTAL STATUS TYPE: ICD-10-CM

## 2020-05-28 DIAGNOSIS — M48.02 DEGENERATIVE CERVICAL SPINAL STENOSIS: ICD-10-CM

## 2020-05-28 DIAGNOSIS — E55.9 VITAMIN D DEFICIENCY: ICD-10-CM

## 2020-05-28 DIAGNOSIS — E03.4 HYPOTHYROIDISM DUE TO ACQUIRED ATROPHY OF THYROID: ICD-10-CM

## 2020-05-28 DIAGNOSIS — G30.1 ALZHEIMER'S TYPE DEMENTIA WITH LATE ONSET WITHOUT BEHAVIORAL DISTURBANCE (HCC): ICD-10-CM

## 2020-05-28 DIAGNOSIS — I65.23 BILATERAL CAROTID ARTERY STENOSIS: ICD-10-CM

## 2020-05-28 DIAGNOSIS — E53.8 B12 DEFICIENCY: ICD-10-CM

## 2020-05-28 DIAGNOSIS — F02.80 ALZHEIMER'S TYPE DEMENTIA WITH LATE ONSET WITHOUT BEHAVIORAL DISTURBANCE (HCC): ICD-10-CM

## 2020-05-28 DIAGNOSIS — M48.02 DEGENERATIVE CERVICAL SPINAL STENOSIS: Primary | ICD-10-CM

## 2020-05-28 DIAGNOSIS — M47.812 CERVICAL SPONDYLOSIS: ICD-10-CM

## 2020-05-28 PROBLEM — I67.89 CEREBRAL MICROVASCULAR DISEASE: Status: ACTIVE | Noted: 2020-05-28

## 2020-05-28 LAB
LEFT CCA DIST DIAS: 19.5 CM/S
LEFT CCA DIST SYS: 89.5 CM/S
LEFT CCA PROX DIAS: 19.5 CM/S
LEFT CCA PROX SYS: 84.6 CM/S
LEFT ECA DIAS: 6 CM/S
LEFT ECA SYS: 60.1 CM/S
LEFT ICA DIST DIAS: 20.3 CM/S
LEFT ICA DIST SYS: 51.9 CM/S
LEFT ICA MID DIAS: 20.8 CM/S
LEFT ICA MID SYS: 51.5 CM/S
LEFT ICA PROX DIAS: 6 CM/S
LEFT ICA PROX SYS: 35.5 CM/S
LEFT ICA/CCA SYS: 0.6
LEFT SUBCLAVIAN DIAS: 0 CM/S
LEFT SUBCLAVIAN SYS: 62.3 CM/S
LEFT VERTEBRAL DIAS: 9.7 CM/S
LEFT VERTEBRAL SYS: 34.3 CM/S
RIGHT CCA DIST DIAS: 14.4 CM/S
RIGHT CCA DIST SYS: 58.3 CM/S
RIGHT CCA PROX DIAS: 16.3 CM/S
RIGHT CCA PROX SYS: 71 CM/S
RIGHT ECA DIAS: 0 CM/S
RIGHT ECA SYS: 37.2 CM/S
RIGHT ICA DIST DIAS: 20.8 CM/S
RIGHT ICA DIST SYS: 55.9 CM/S
RIGHT ICA MID DIAS: 14.2 CM/S
RIGHT ICA MID SYS: 37.2 CM/S
RIGHT ICA PROX DIAS: 12 CM/S
RIGHT ICA PROX SYS: 40.5 CM/S
RIGHT ICA/CCA SYS: 1
RIGHT SUBCLAVIAN DIAS: 0 CM/S
RIGHT SUBCLAVIAN SYS: 70.8 CM/S
RIGHT VERTEBRAL DIAS: 13.1 CM/S
RIGHT VERTEBRAL SYS: 40.5 CM/S

## 2020-05-28 PROCEDURE — 72131 CT LUMBAR SPINE W/O DYE: CPT

## 2020-05-28 PROCEDURE — 70450 CT HEAD/BRAIN W/O DYE: CPT

## 2020-05-28 PROCEDURE — 72125 CT NECK SPINE W/O DYE: CPT

## 2020-05-28 PROCEDURE — 93880 EXTRACRANIAL BILAT STUDY: CPT

## 2020-05-28 NOTE — PROGRESS NOTES
I called the patient and his wife, explained he has significant lumbar and cervical degenerative disease, with probable spinal cord compression in the cervical region, and offered him referral to either orthopedic or neurosurgery back specialist but they would prefer to try physical therapy first because they do not want an operation which I can understand at age 78 and a bad heart, so put an order for physical therapy here and we will see how he does and he will call us if there is any change in the interim.

## 2020-05-28 NOTE — PROGRESS NOTES
This note will not be viewable in [address] [facility] at [address]    Office:  [phone]  Fax: [phone]                 Initial Office Exam    Patient Name: Adrienne Jc  Age: 78 y.o. Gender: male   Occupation:  Handedness: right handed   Presenting Concern: Memory Decline  Primary Care Physician: David Liang MD  Referring Provider: Libra Pendleton MD      REASON FOR REFERRAL:  This comprehensive and medically necessary neuropsychological assessment was requested to assist with a differential diagnosis of dementia. The use and purpose of this examination, as well as the extent and limitations of confidentiality, were explained prior to obtaining permission to participate. Instructions were provided regarding the necessity to put forth optimal effort and answer questions truthfully in order to obtain reliable and accurate test results. PERTINENT HISTORY:  Mr. Shahnaz Montemayor presented for a neuropsychological assessment at the recommendation of his treating physician secondary to complaints of memory loss, difficulty maintaining sleep, anxiousness, feeling slowed down, and decreased efficiency with technology. It might be noted that Mr. Shahnaz Montemayor was not clear why he needed the evaluation and attributed his loss secondary to his age. From a brief review of his medical and personal history there has not been any other significant neurological injury or illness noted or reported. He did not report experiencing depression or anxiety in the past.      Mr. Shahnaz Montemayor does not  report any problems at birth or difficulties meeting developmental milestones. He reports that he had an adequate level of family support and was not subject to trauma or abuse as a child.   Mr. Shahnaz Montemayor does not  report being retain in school or receiving special assistance in any of he classes or subjects. Mr. Sabina Velasquez completed 16 years of education. Mr. Sabina Velasquez does  exercise on a regular basis and does not  maintain a balanced diet. He does  report problems with sleep and does  complain of pain. He does  participate in mentally stimulating activities. Mr. Sabina Velasquez does not  have concerns regarding prescription medications, family members, place of residence, or financial stressors. Mr. Sabina Velasquez indicated that he is independent in his instrumental activities of daily living, including shopping, meal preparation, housekeeping, doing laundry, driving a car, managing medications, and finances. Current Outpatient Medications   Medication Sig    Dutasteride-Tamsulosin 0.5-0.4 mg CM24 TAKE 1 CAPSULE BY MOUTH EVERY DAY    Alphagan P 0.1 % ophthalmic solution     GARLIC OIL PO Take  by mouth.  aspirin delayed-release 81 mg tablet Take  by mouth daily.  furosemide (Lasix) 20 mg tablet Take 20 mg by mouth daily.  pravastatin (PRAVACHOL) 80 mg tablet TAKE 1 TABLET BY MOUTH AT BEDTIME    salsalate (DISALCID) 500 mg tablet TAKE 1 TAB BY MOUTH TWO (2) TIMES A DAY.  nitroglycerin (NITROSTAT) 0.4 mg SL tablet 1 Tab by SubLINGual route every five (5) minutes as needed for Chest Pain. Up to 3 doses.  VYZULTA 0.024 % drop Apply 1 Drop to eye nightly.  Artifi. Tear, Hypromellose,-PF (RETAINE HPMC) 0.3 % drop Apply 1 Drop to eye two (2) times a day.  carvedilol (COREG) 3.125 mg tablet Take 6.25 mg by mouth two (2) times a day.  lisinopril (PRINIVIL, ZESTRIL) 10 mg tablet Take  by mouth daily.  fish oil-omega-3 fatty acids 340-1,000 mg capsule Take 2 Caps by mouth daily.  multivitamin,tx-iron-ca-min (THERA-M W/ IRON) 27-0.4 mg tab Take 1 Tab by mouth daily. No current facility-administered medications for this visit.         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 No data recorded    Past Surgical History:   Procedure Laterality Date    CABG, ARTERY-VEIN, THREE  4/18/2007    HX COLONOSCOPY      HX ORTHOPAEDIC      bilateral bunionectomy    HX PACEMAKER      VASCULAR SURGERY PROCEDURE UNLIST         Social History     Socioeconomic History    Marital status:      Spouse name: Sachi Dorsey    Number of children: 3    Years of education: 25    Highest education level: Not on file   Occupational History    Occupation: Retired     Employer: [address] [place] [address] [employer]   Tobacco Use    Smoking status: Never Smoker    Smokeless tobacco: Never Used   Substance and Sexual Activity    Alcohol use: No     Comment: quit 12/10/1980    Drug use: No    Sexual activity: Yes     Partners: Female       Family History   Problem Relation Age of Onset    No Known Problems Mother     No Known Problems Father     No Known Problems Sister     Asthma Son     Other Brother         old age       CT Results (most recent):  Results from Hospital Encounter encounter on 05/28/20   CT HEAD WO CONT    Narrative EXAM: CT HEAD WO CONT    INDICATION: Memory loss and unsteady gait    COMPARISON: None. CONTRAST: None. TECHNIQUE: Unenhanced CT of the head was performed using 5 mm images. Brain and  bone windows were generated. CT dose reduction was achieved through use of a  standardized protocol tailored for this examination and automatic exposure  control for dose modulation. FINDINGS:  No calvarial abnormalities are detected. There is evidence of mild deviation of  the anterior aspect of the nasal septum towards the left. There is evidence of  moderate cerebral atrophy. Minimal basal ganglia calcification is noted  bilaterally (see axial image 14).       Impression IMPRESSION:  1. No evidence of intracranial hemorrhage. 2. Evidence of moderate cerebral atrophy. There is no evidence of intracranial  hemorrhage. MRI Results (most recent):  No results found for this or any previous visit. MENTAL STATUS:    Orientation: Disoriented to county, location, and hospital   Eye Contact: appropriate   Motor Behavior:  Ambulates with widen gait   Speech:  Fluent and intelligible   Thought Process: Normal   Thought Content: No aphasia or delusions   Suicidal ideations: Denies   Mood:  Euthymic   Affect:  Normal   Concentration:  WNL   Abstraction:  WNL   Insight:  Mildly Decreased     On the Modified Mini-Mental Status Exam: 83/100 (7th %ile) Mild impairment      DIAGNOSTIC IMPRESSIONS:    ICD-10-CM ICD-9-CM    1. Memory deficit R41.3 780.93          PLAN:  1. Complete a comprehensive neuropsychological assessment to provide a differential diagnosis of presenting concerns as well as to assist with disposition and treatment planning as appropriate. 2. Consider compensatory and remedial cognitive training. 3. Consider an adaptive driving evaluation. 4. Consider referral for elder health nurse to provide an in-home functional assessment. 5. Consider placement issues to provide greater structure and supervision to ensure safety, health and well-being. [id number] x 1 Review of records. Face to face interview w/ patient. Determine test protocol: 60 minutes. Total 1 unit        Eriberto Read, PhD, ABPP, LCP  Licensed Clinical Psychologist/ Neuropsychologist        This note will not be viewable in [address]

## 2020-06-19 ENCOUNTER — HOSPITAL ENCOUNTER (EMERGENCY)
Age: 80
Discharge: HOME | End: 2020-06-20
Attending: EMERGENCY MEDICINE | Admitting: EMERGENCY MEDICINE
Payer: MEDICARE

## 2020-06-19 DIAGNOSIS — R55 VASOVAGAL EPISODE: Primary | ICD-10-CM

## 2020-06-19 DIAGNOSIS — R19.7 DIARRHEA, UNSPECIFIED TYPE: ICD-10-CM

## 2020-06-19 DIAGNOSIS — R00.1 BRADYCARDIA: ICD-10-CM

## 2020-06-19 PROCEDURE — 99285 EMERGENCY DEPT VISIT HI MDM: CPT

## 2020-06-20 VITALS
SYSTOLIC BLOOD PRESSURE: 133 MMHG | TEMPERATURE: 98 F | OXYGEN SATURATION: 100 % | RESPIRATION RATE: 16 BRPM | HEART RATE: 61 BPM | BODY MASS INDEX: 24.01 KG/M2 | WEIGHT: 177.25 LBS | HEIGHT: 72 IN | DIASTOLIC BLOOD PRESSURE: 73 MMHG

## 2020-06-20 LAB
ALBUMIN SERPL-MCNC: 3.7 G/DL (ref 3.5–5)
ALBUMIN/GLOB SERPL: 1.1 {RATIO} (ref 1.1–2.2)
ALP SERPL-CCNC: 33 U/L (ref 45–117)
ALT SERPL-CCNC: 31 U/L (ref 12–78)
ANION GAP SERPL CALC-SCNC: 5 MMOL/L (ref 5–15)
AST SERPL-CCNC: 30 U/L (ref 15–37)
ATRIAL RATE: 60 BPM
BASOPHILS # BLD: 0.1 K/UL (ref 0–0.1)
BASOPHILS NFR BLD: 1 % (ref 0–1)
BILIRUB SERPL-MCNC: 1.3 MG/DL (ref 0.2–1)
BUN SERPL-MCNC: 18 MG/DL (ref 6–20)
BUN/CREAT SERPL: 9 (ref 12–20)
CALCIUM SERPL-MCNC: 8.3 MG/DL (ref 8.5–10.1)
CALCULATED R AXIS, ECG10: -71 DEGREES
CALCULATED T AXIS, ECG11: 97 DEGREES
CHLORIDE SERPL-SCNC: 106 MMOL/L (ref 97–108)
CO2 SERPL-SCNC: 29 MMOL/L (ref 21–32)
CREAT SERPL-MCNC: 1.99 MG/DL (ref 0.7–1.3)
DIAGNOSIS, 93000: NORMAL
DIFFERENTIAL METHOD BLD: ABNORMAL
EOSINOPHIL # BLD: 0.2 K/UL (ref 0–0.4)
EOSINOPHIL NFR BLD: 3 % (ref 0–7)
ERYTHROCYTE [DISTWIDTH] IN BLOOD BY AUTOMATED COUNT: 12.8 % (ref 11.5–14.5)
GLOBULIN SER CALC-MCNC: 3.3 G/DL (ref 2–4)
GLUCOSE SERPL-MCNC: 92 MG/DL (ref 65–100)
HCT VFR BLD AUTO: 42.7 % (ref 36.6–50.3)
HGB BLD-MCNC: 13.7 G/DL (ref 12.1–17)
IMM GRANULOCYTES # BLD AUTO: 0 K/UL (ref 0–0.04)
IMM GRANULOCYTES NFR BLD AUTO: 0 % (ref 0–0.5)
LIPASE SERPL-CCNC: 207 U/L (ref 73–393)
LYMPHOCYTES # BLD: 1.4 K/UL (ref 0.8–3.5)
LYMPHOCYTES NFR BLD: 27 % (ref 12–49)
MAGNESIUM SERPL-MCNC: 1.9 MG/DL (ref 1.6–2.4)
MCH RBC QN AUTO: 28.7 PG (ref 26–34)
MCHC RBC AUTO-ENTMCNC: 32.1 G/DL (ref 30–36.5)
MCV RBC AUTO: 89.3 FL (ref 80–99)
MONOCYTES # BLD: 0.5 K/UL (ref 0–1)
MONOCYTES NFR BLD: 9 % (ref 5–13)
NEUTS SEG # BLD: 3.1 K/UL (ref 1.8–8)
NEUTS SEG NFR BLD: 60 % (ref 32–75)
NRBC # BLD: 0 K/UL (ref 0–0.01)
NRBC BLD-RTO: 0 PER 100 WBC
P-R INTERVAL, ECG05: 166 MS
PLATELET # BLD AUTO: 122 K/UL (ref 150–400)
PMV BLD AUTO: 10.4 FL (ref 8.9–12.9)
POTASSIUM SERPL-SCNC: 4.3 MMOL/L (ref 3.5–5.1)
PROT SERPL-MCNC: 7 G/DL (ref 6.4–8.2)
Q-T INTERVAL, ECG07: 432 MS
QRS DURATION, ECG06: 144 MS
QTC CALCULATION (BEZET), ECG08: 432 MS
RBC # BLD AUTO: 4.78 M/UL (ref 4.1–5.7)
SODIUM SERPL-SCNC: 140 MMOL/L (ref 136–145)
TROPONIN I SERPL-MCNC: <0.05 NG/ML
VENTRICULAR RATE, ECG03: 60 BPM
WBC # BLD AUTO: 5.2 K/UL (ref 4.1–11.1)

## 2020-06-20 PROCEDURE — 36415 COLL VENOUS BLD VENIPUNCTURE: CPT

## 2020-06-20 PROCEDURE — 85025 COMPLETE CBC W/AUTO DIFF WBC: CPT

## 2020-06-20 PROCEDURE — 74011250636 HC RX REV CODE- 250/636: Performed by: EMERGENCY MEDICINE

## 2020-06-20 PROCEDURE — 84484 ASSAY OF TROPONIN QUANT: CPT

## 2020-06-20 PROCEDURE — 80053 COMPREHEN METABOLIC PANEL: CPT

## 2020-06-20 PROCEDURE — 93005 ELECTROCARDIOGRAM TRACING: CPT

## 2020-06-20 PROCEDURE — 83690 ASSAY OF LIPASE: CPT

## 2020-06-20 PROCEDURE — 83735 ASSAY OF MAGNESIUM: CPT

## 2020-06-20 RX ADMIN — SODIUM CHLORIDE 1000 ML: 900 INJECTION, SOLUTION INTRAVENOUS at 02:59

## 2020-06-20 NOTE — DISCHARGE INSTRUCTIONS
Your blood work did not show any abnormalities today that require immediate attention or hospitalization. Your heart rate is on the lower end (56-65 beats per minute) which may be contributing to the weakness episode you had. Go back to your old Carvedilol dose. This will make sure your heart rate is not lowered too much. Continue taking the new dose of Lisinopril. Measure your blood pressure and heart rate twice a day over the weekend and let Dr. Juvencio Bermeo know the readings. Bradycardia: Care Instructions  Your Care Instructions     Bradycardia is a slow heart rate. A slow heart rate can be normal and healthy. Or it could be a sign of a problem with the heart's electrical system. If your heart beats too slowly, it may not supply your body with enough blood. This can make you weak or dizzy. Or it may make you pass out. Bradycardia can be caused by many things. This includes medicine, certain medical conditions, and changes in the heart that are the result of aging. How bradycardia is treated depends on what is causing it. Treatments include treating another health problem, changing a medicine, and getting a pacemaker. Treatment also depends on the symptoms. If bradycardia doesn't cause symptoms, it may not be treated. You and your doctor can decide what treatment is right for you. Follow-up care is a key part of your treatment and safety. Be sure to make and go to all appointments, and call your doctor if you are having problems. It's also a good idea to know your test results and keep a list of the medicines you take. How can you care for yourself at home? · Take your medicines exactly as prescribed. Call your doctor if you think you are having a problem with your medicine. If your bradycardia is caused by another disease, your doctor will try to treat the disease. If it is caused by heart medicines, he or she will adjust your medicines.   · Make lifestyle changes to improve your heart health. ? Eat a heart-healthy diet that includes vegetables, fruits, nuts, beans, lean meat, fish, and whole grains. Limit alcohol, sodium, and sugar. ? Get regular exercise. Try for 30 minutes on most days of the week. If you do not have other heart problems, you likely do not have limits on the type or level of activity that you can do. You may want to walk, swim, bike, or do other activities. Ask your doctor what level of exercise is safe for you.  ? Stay at a healthy weight. Lose weight if you need to.  ? Do not smoke. If you need help quitting, talk to your doctor about stop-smoking programs and medicines. These can increase your chances of quitting for good. ? Manage other health problems such as high blood pressure, high cholesterol, and diabetes. · If you get a pacemaker, you will get information about it. When should you call for help? [id number] anytime you think you may need emergency care. For example, call if:  · You have symptoms of sudden heart failure. These may include:  ? Severe trouble breathing. ? A fast or irregular heartbeat. ? Coughing up pink, foamy mucus. ? You passed out. · You have symptoms of a stroke. These may include:  ? Sudden numbness, tingling, weakness, or loss of movement in your face, arm, or leg, especially on only one side of your body. ? Sudden vision changes. ? Sudden trouble speaking. ? Sudden confusion or trouble understanding simple statements. ? Sudden problems with walking or balance. ? A sudden, severe headache that is different from past headaches. Call your doctor now or seek immediate medical care if:  · You have new or changed symptoms of heart failure, such as:  ? New or increased shortness of breath. ? New or worse swelling in your legs, ankles, or feet. ? Sudden weight gain, such as more than 2 to 3 pounds in a day or 5 pounds in a week. (Your doctor may suggest a different range of weight gain.)  ?  Feeling dizzy or lightheaded or like you may faint.  ? Feeling so tired or weak that you cannot do your usual activities. ? Not sleeping well. Shortness of breath wakes you at night. You need extra pillows to prop yourself up to breathe easier. Watch closely for changes in your health, and be sure to contact your doctor if:  · You do not get better as expected. Where can you learn more? Go to http://hari-sammy.info/  Enter A773 in the search box to learn more about \"Bradycardia: Care Instructions. \"  Current as of: December 16, 2019               Content Version: 12.5  © 4256-1841 [employer]. Care instructions adapted under license by [employer] (which disclaims liability or warranty for this information). If you have questions about a medical condition or this instruction, always ask your healthcare professional. [address] any warranty or liability for your use of this information. Patient Education        Diarrhea: Care Instructions  Your Care Instructions        Diarrhea is loose, watery stools (bowel movements). The exact cause is often hard to find. Sometimes diarrhea is your body's way of getting rid of what caused an upset stomach. Viruses, food poisoning, and many medicines can cause diarrhea. Some people get diarrhea in response to emotional stress, anxiety, or certain foods. Almost everyone has diarrhea now and then. It usually isn't serious, and your stools will return to normal soon. The important thing to do is replace the fluids you have lost, so you can prevent dehydration. The doctor has checked you carefully, but problems can develop later. If you notice any problems or new symptoms, get medical treatment right away. Follow-up care is a key part of your treatment and safety. Be sure to make and go to all appointments, and call your doctor if you are having problems.  It's also a good idea to know your test results and keep a list of the medicines you take.  How can you care for yourself at home? · Watch for signs of dehydration, which means your body has lost too much water. Dehydration is a serious condition and should be treated right away. Signs of dehydration are:  ? Increasing thirst and dry eyes and mouth. ? Feeling faint or lightheaded. ? A smaller amount of urine than normal.  · To prevent dehydration, drink plenty of fluids. Choose water and other caffeine-free clear liquids until you feel better. If you have kidney, heart, or liver disease and have to limit fluids, talk with your doctor before you increase the amount of fluids you drink. · Begin eating small amounts of mild foods the next day, if you feel like it. ? Try yogurt that has live cultures of Lactobacillus. (Check the label.)  ? Avoid spicy foods, fruits, alcohol, and caffeine until 48 hours after all symptoms are gone. ? Avoid chewing gum that contains sorbitol. ? Avoid dairy products (except for yogurt with Lactobacillus) while you have diarrhea and for 3 days after symptoms are gone. · The doctor may recommend that you take over-the-counter medicine, such as loperamide (Imodium), if you still have diarrhea after 6 hours. Read and follow all instructions on the label. Do not use this medicine if you have bloody diarrhea, a high fever, or other signs of serious illness. Call your doctor if you think you are having a problem with your medicine. When should you call for help? [id number] anytime you think you may need emergency care. For example, call if:  · You passed out (lost consciousness). · Your stools are maroon or very bloody. Call your doctor now or seek immediate medical care if:  · You are dizzy or lightheaded, or you feel like you may faint. · Your stools are black and look like tar, or they have streaks of blood. · You have new or worse belly pain. · You have symptoms of dehydration, such as:  ? Dry eyes and a dry mouth. ? Passing only a little dark urine. ?  Feeling thirstier than usual.  · You have a new or higher fever. Watch closely for changes in your health, and be sure to contact your doctor if:  · Your diarrhea is getting worse. · You see pus in the diarrhea. · You are not getting better after 2 days (48 hours). Where can you learn more? Go to http://hari-sammy.info/  Enter [id number] in the search box to learn more about \"Diarrhea: Care Instructions. \"  Current as of: June 26, 2019               Content Version: 12.5  © 3045-0092 [employer]. Care instructions adapted under license by [employer] (which disclaims liability or warranty for this information). If you have questions about a medical condition or this instruction, always ask your healthcare professional. [address] any warranty or liability for your use of this information.

## 2020-06-22 ENCOUNTER — PATIENT OUTREACH (OUTPATIENT)
Dept: FAMILY MEDICINE CLINIC | Age: 80
End: 2020-06-22

## 2020-06-22 NOTE — PROGRESS NOTES
Patient contacted regarding recent discharge and COVID-19 risk. Discussed COVID-19 related testing which was not done at this time. Test results were not done. Patient informed of results, if available? no    Care Transition Nurse/ Ambulatory Care Manager contacted the patient by telephone to perform post discharge assessment. Verified name and  with patient as identifiers. Patient has following risk factors of: chronic kidney disease. CTN/ACM reviewed discharge instructions, medical action plan and red flags related to discharge diagnosis. Reviewed and educated them on any new and changed medications related to discharge diagnosis. Advised obtaining a 90-day supply of all daily and as-needed medications. Education provided regarding infection prevention, and signs and symptoms of COVID-19 and when to seek medical attention with patient who verbalized understanding. Discussed exposure protocols and quarantine from [address] you at higher risk for severe illness  and given an opportunity for questions and concerns. The patient agrees to contact the COVID-19 hotline [phone] or PCP office for questions related to their healthcare. CTN/ACM provided contact information for future reference. From CDC: Are you at higher risk for severe illness?  Wash your hands often.  Avoid close contact (6 feet, which is about two arm lengths) with people who are sick.  Put distance between yourself and other people if COVID-19 is spreading in your community.  Clean and disinfect frequently touched surfaces.  Avoid all cruise travel and non-essential air travel.  Call your healthcare professional if you have concerns about COVID-19 and your underlying condition or if you are sick.     For more information on steps you can take to protect yourself, see CDC's How to Protect Yourself      Patient/family/caregiver given information for Bari Meyer and agrees to enroll no  Patient's preferred e-mail:  n/a  Patient's preferred phone number: n/a  Based on Loop alert triggers, patient will be contacted by nurse care manager for worsening symptoms. Plan for follow-up call in 7-14 days based on severity of symptoms and risk factors.

## 2020-06-25 NOTE — ED PROVIDER NOTES
EMERGENCY DEPARTMENT HISTORY AND PHYSICAL EXAM    Please note that this dictation was completed with [employer], the computer voice recognition software. Quite often unanticipated grammatical, syntax, homophones, and other interpretive errors are inadvertently transcribed by the computer software. Please disregard these errors. Please excuse any errors that have escaped final proofreading. Date: 6/19/2020  Patient Name: Mando Chandra  Patient Age and Sex: 78 y.o. male    History of Presenting Illness     Chief Complaint   Patient presents with    Fatigue     pt states he was at his home and suddenly became very weak. his wife helped him to the restroom where he had a large episode of diarrhea. his PCP changed his dosage of lisinopril & carvedilol dosage yesterday an dhe believes his symptoms are related to that    Diarrhea       History Provided By: Patient    HPI: Mando Chandra, is a 78 y.o. male whose medical history is noted below presents to the ED with an episode of lightheadedness and diaphoresis which occurred after he had an explosive, large, loose BM this evening. He thought he may faint and called for his wife who helped him get to the bed. Patient denies cp, sob, palpitations. Did not have LOC. Wife was concenred as he appeared diaphoretic, clammy, pale. EMS called because he was concerned that he may be having a heart attack. His symptoms gradually improved and he is now asymptomatic. No additional diarrhea/bms. No abd pain. Pt denies any other alleviating or exacerbating factors. There are no other complaints, changes or physical findings at this time.      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    HX COLONOSCOPY      HX ORTHOPAEDIC      bilateral bunionectomy    HX PACEMAKER      VASCULAR SURGERY PROCEDURE UNLIST         Family History:  Family History   Problem Relation Age of Onset    No Known Problems Mother     No Known Problems Father     No Known Problems Sister    [facility] Son     Other Brother         old age       Social History:  Social History     Tobacco Use    Smoking status: Never Smoker    Smokeless tobacco: Never Used   Substance Use Topics    Alcohol use: No     Comment: quit 12/10/1980    Drug use: No       Allergies: Allergies   Allergen Reactions    Clopidogrel Other (comments)     bradycardia       Review of Systems     Review of Systems   Constitutional: Negative for appetite change, chills and fever. Respiratory: Negative for cough, chest tightness and shortness of breath. Cardiovascular: Negative for chest pain, palpitations and leg swelling. Gastrointestinal: Negative for abdominal distention, abdominal pain, blood in stool, constipation, diarrhea, nausea and vomiting. Genitourinary: Negative for decreased urine volume, difficulty urinating, dysuria, flank pain, frequency and hematuria. Musculoskeletal: Negative for arthralgias, joint swelling, myalgias, neck pain and neck stiffness. Neurological: Positive for light-headedness. Negative for dizziness, seizures, speech difficulty, weakness, numbness and headaches. Hematological: Negative for adenopathy. All other systems reviewed and are negative. Physical Exam     Physical Exam  Vitals signs and nursing note reviewed. Constitutional:       Appearance: He is not toxic-appearing. HENT:      Head: Atraumatic. Mouth/Throat:      Mouth: Mucous membranes are moist.   Eyes:      General: No scleral icterus. Extraocular Movements: Extraocular movements intact.       Conjunctiva/sclera: Conjunctivae normal.      Pupils: Pupils are equal, round, and reactive to light. Neck:      Musculoskeletal: Normal range of motion and neck supple. Cardiovascular:      Rate and Rhythm: Normal rate and regular rhythm. Pulses: Normal pulses. Heart sounds: Normal heart sounds. Pulmonary:      Effort: Pulmonary effort is normal.      Breath sounds: Normal breath sounds. Abdominal:      General: Bowel sounds are normal.      Palpations: Abdomen is soft. Tenderness: There is no abdominal tenderness. Musculoskeletal: Normal range of motion. Skin:     General: Skin is warm and dry. Capillary Refill: Capillary refill takes less than 2 seconds. Neurological:      General: No focal deficit present. Mental Status: He is alert. Mental status is at baseline. Psychiatric:         Mood and Affect: Mood normal.         Behavior: Behavior normal.         Diagnostic Study Results     Labs - I have personally reviewed and interpreted all laboratory results. Kelvin Guerra MD, MSc  Recent Results (from the past 250 hour(s))   CBC WITH AUTOMATED DIFF    Collection Time: 06/20/20 12:34 AM   Result Value Ref Range    WBC 5.2 4.1 - 11.1 K/uL    RBC 4.78 4.10 - 5.70 M/uL    HGB 13.7 12.1 - 17.0 g/dL    HCT 42.7 36.6 - 50.3 %    MCV 89.3 80.0 - 99.0 FL    MCH 28.7 26.0 - 34.0 PG    MCHC 32.1 30.0 - 36.5 g/dL    RDW 12.8 11.5 - 14.5 %    PLATELET 118 (L) 205 - 400 K/uL    MPV 10.4 8.9 - 12.9 FL    NRBC 0.0 0  WBC    ABSOLUTE NRBC 0.00 0.00 - 0.01 K/uL    NEUTROPHILS 60 32 - 75 %    LYMPHOCYTES 27 12 - 49 %    MONOCYTES 9 5 - 13 %    EOSINOPHILS 3 0 - 7 %    BASOPHILS 1 0 - 1 %    IMMATURE GRANULOCYTES 0 0.0 - 0.5 %    ABS. NEUTROPHILS 3.1 1.8 - 8.0 K/UL    ABS. LYMPHOCYTES 1.4 0.8 - 3.5 K/UL    ABS. MONOCYTES 0.5 0.0 - 1.0 K/UL    ABS. EOSINOPHILS 0.2 0.0 - 0.4 K/UL    ABS. BASOPHILS 0.1 0.0 - 0.1 K/UL    ABS. IMM.  GRANS. 0.0 0.00 - 0.04 K/UL    DF AUTOMATED     METABOLIC PANEL, COMPREHENSIVE Collection Time: 06/20/20 12:34 AM   Result Value Ref Range    Sodium 140 136 - 145 mmol/L    Potassium 4.3 3.5 - 5.1 mmol/L    Chloride 106 97 - 108 mmol/L    CO2 29 21 - 32 mmol/L    Anion gap 5 5 - 15 mmol/L    Glucose 92 65 - 100 mg/dL    BUN 18 6 - 20 MG/DL    Creatinine 1.99 (H) 0.70 - 1.30 MG/DL    BUN/Creatinine ratio 9 (L) 12 - 20      GFR est AA 39 (L) >60 ml/min/1.73m2    GFR est non-AA 32 (L) >60 ml/min/1.73m2    Calcium 8.3 (L) 8.5 - 10.1 MG/DL    Bilirubin, total 1.3 (H) 0.2 - 1.0 MG/DL    ALT (SGPT) 31 12 - 78 U/L    AST (SGOT) 30 15 - 37 U/L    Alk. phosphatase 33 (L) 45 - 117 U/L    Protein, total 7.0 6.4 - 8.2 g/dL    Albumin 3.7 3.5 - 5.0 g/dL    Globulin 3.3 2.0 - 4.0 g/dL    A-G Ratio 1.1 1.1 - 2.2     LIPASE    Collection Time: 06/20/20 12:34 AM   Result Value Ref Range    Lipase 207 73 - 393 U/L   MAGNESIUM    Collection Time: 06/20/20 12:34 AM   Result Value Ref Range    Magnesium 1.9 1.6 - 2.4 mg/dL   TROPONIN I    Collection Time: 06/20/20 12:34 AM   Result Value Ref Range    Troponin-I, Qt. <0.05 <0.05 ng/mL   EKG, 12 LEAD, INITIAL    Collection Time: 06/20/20 12:42 AM   Result Value Ref Range    Ventricular Rate 60 BPM    Atrial Rate 60 BPM    P-R Interval 166 ms    QRS Duration 144 ms    Q-T Interval 432 ms    QTC Calculation (Bezet) 432 ms    Calculated R Axis -71 degrees    Calculated T Axis 97 degrees    Diagnosis       Poor data quality  Ventricular paced rhythm  When compared with ECG of 05-APR-2014 04:42,  No significant change was found  Confirmed by Lilian Whitt ([id number]) on 6/20/2020 2:31:46 PM         Radiologic Studies - I have personally reviewed and interpreted all imaging studies and agree with radiology interpretation and report. - Renae Tovar MD, MSc  No orders to display         Medical Decision Making   I am the first provider for this patient.     Records Reviewed: I reviewed our electronic medical record system for any past medical records that were available that may contribute to the patient's current condition, including their PMH, surgical history, social and family history. Reviewed the nursing notes and vital signs from today's visit. Nursing notes will be reviewed as they become available in realtime while the pt has been in the ED. Junito Maddox MD Msc    Vital Signs-Reviewed the patient's vital signs. Provider Notes (Medical Decision Making):   Patient presents after near-syncopal episode which occurred this evening. Circumstances surrounding event very suggestive of vasovagal episode. He is now well-appearing and has no complaints. Physical exam unremarkable. Now with steady gait and no dizizness/lightheadness. Never had cp, sob, palpitations. Will send basic labs including trop. Pacer interrogation, although low suspicion for arrhythmia. He is usually slightly bradycardic so this is not new. Anticipate discharge unless concerning findings noted on workup. ED Course:   Initial assessment performed. The patients presenting problems have been discussed, and they are in agreement with the care plan formulated and outlined with them. I have encouraged them to ask questions as they arise throughout their visit. Wes Diamond MD, am the attending of record for this patient encounter. ED Orders Placed/Medications Administered During ED Course:   Orders Placed This Encounter    CBC WITH AUTOMATED DIFF    METABOLIC PANEL, COMPREHENSIVE    LIPASE    MAGNESIUM    TROPONIN I    POC LACTIC ACID    VITAL SIGNS    EKG 12 LEAD INITIAL    SALINE LOCK IV ONE TIME STAT    sodium chloride 0.9 % bolus infusion 1,000 mL     Medications   sodium chloride 0.9 % bolus infusion 1,000 mL (0 mL IntraVENous IV Completed 6/20/20 5578)     Progress note:  Patient has been reassessed and reports feeling considerably better, has normal vital signs and feels comfortable going home.  I think this is reasonable as no findings today suggest a life-threatening condition. DISPOSITION: DISCHARGE  The patient's results have been reviewed with patient and available family and/or caregiver. They verbally convey their understanding and agreement of the patient's signs, symptoms, diagnosis, treatment and prognosis and additionally agree to follow up as recommended in the discharge instructions or to return to the Emergency Department should the patient's condition change prior to their follow-up appointment. The patient and available family and/or caregiver verbally agree with the care plan and all of their questions have been answered. The discharge instructions have also been provided to the them with educational information regarding the patient's diagnosis as well a list of reasons why the patient would want to return to the ER prior to their follow-up appointment should any concerns arise, the patient's condition change or symptoms worsen. Marj Lincoln MD, Msc    PLAN:  Discharge Medication List as of 6/20/2020  4:59 AM      1.   2.     Follow-up Information     Follow up With Specialties Details Why Contact Manish Haas, DO Cardiology Call Monday morning. Review your emergency room visit and discuss if any changes in your medications are necessary. [address]  [address]  [facility]  [phone]      [facility] [place] EM[facility] DE[facility] Emergency Medicine  As needed, If symptoms worsen [address]  [address]   [address] [address] [phone]        3. Return to ED if worse           Diagnosis     Clinical Impression:   1. Vasovagal episode    2. Bradycardia    3. Diarrhea, unspecified type        Attestation:  I personally performed the services described in this documentation on this date 6/19/2020 for patient Sailna Romero.   Marj Lincoln MD

## 2020-07-13 ENCOUNTER — OFFICE VISIT (OUTPATIENT)
Dept: NEUROLOGY | Age: 80
End: 2020-07-13

## 2020-07-13 DIAGNOSIS — G31.84 MILD COGNITIVE IMPAIRMENT WITH MEMORY LOSS: Primary | ICD-10-CM

## 2020-07-13 NOTE — LETTER
7/18/20 Patient: Debbie Berry YOB: 1940 Date of Visit: 7/13/2020 Herbert Velasco MD 
[facility] [address] [id number] VIA In Basket Preston Hernandez MD 
[address] [facility] [address] [address] VIA In Basket Dear MD Preston Anderson MD, Thank you for referring Mr. Debbie Berry to [facility] [address]  for evaluation. My notes for this consultation are attached. This note will not be viewable in [address] [facility] at [place] [address], Medical Office Building [address] Office:  [phone]  Fax: [phone] Neuropsychological Evaluation Report Patient Name: Debbie Berry Age: 78 y.o. Gender: male Occupation: 
Handedness: right handed Presenting Concern: Memory Decline Primary Care Physician: Ramez Whaley MD 
Referring Provider: Ayana Martinez MD 
 
PATIENT HISTORY (OBTAINED DURING INITIAL CLINICAL EVALUATION): 
 
REASON FOR REFERRAL: 
This comprehensive and medically necessary neuropsychological assessment was requested to assist with a differential diagnosis of dementia. The use and purpose of this examination, as well as the extent and limitations of confidentiality, were explained prior to obtaining permission to participate. Instructions were provided regarding the necessity to put forth optimal effort and answer questions truthfully in order to obtain reliable and accurate test results. 
  
PERTINENT HISTORY: 
Mr. Mayfield Friday presented for a neuropsychological assessment at the recommendation of his treating physician secondary to complaints of memory loss, difficulty maintaining sleep, anxiousness, feeling slowed down, and decreased efficiency with technology.  It might be noted that Mr. Mayfield Friday was not clear why he needed the evaluation and attributed his loss secondary to his age. From a brief review of his medical and personal history there has not been any other significant neurological injury or illness noted or reported. He did not report experiencing depression or anxiety in the past.   
  
Mr. Byrne does not  report any problems at birth or difficulties meeting developmental milestones. He reports that he had an adequate level of family support and was not subject to trauma or abuse as a child. Mr. Shahab Dorsey does not  report being retain in school or receiving special assistance in any of he classes or subjects. Mr. Shahab Dorsey completed 16 years of education. 
  
Mr. Byrne does  exercise on a regular basis and does not  maintain a balanced diet. He does  report problems with sleep and does  complain of pain. He does  participate in mentally stimulating activities. Mr. Shahab Dorsey does not  have concerns regarding prescription medications, family members, place of residence, or financial stressors. Mr. Shahab Dorsey indicated that he is independent in his instrumental activities of daily living, including shopping, meal preparation, housekeeping, doing laundry, driving a car, managing medications, and finances. METHODS OF ASSESSMENT (Current Evaluation): 
Clinician Administered: 
Clinical Interview Review of Medical Records Clock Drawing Task Modified Mini-Mental Status Exam (3MS) Test of Premorbid Functioning Alves Anxiety Inventory (TRISHA) Alves Depression Inventory (BDI-2) Revised Memory and Behavior Checklist 
 
Technician Administered: 
NAB Judgment RBANS-B Reliable Digit Span Test of Memory Malingering Test of Practical Judgment (9-Item) [place] Functional Living Scale Trail Making Test 
Word Choice Effort Test (ACS) TEST OBSERVATIONS: 
Mr. Shahab Dorsey arrived promptly for the testing session.   Dress and grooming were appropriate; physical presentation was unchanged from that observed during the clinical interview. Speech was fluent, intelligible, and goal-directed. Affect was congruent with the euthymic mood conveyed. Mr. Destini Fontaine was adequately cooperative and appeared to put forth good effort throughout this examination. Rapport with the examiner was adequately established and maintained. Minimal prompting was required. Comprehension of test instructions was not problematic. Performance motivation was objectively measured by four instruments (TOMM, WC Effort, Reliable Digit Span, RBANS EI), and Mr. Destini Fontaine produced a normal score on these measures. Accordingly, test findings below do not appear to be the product of disingenuous effort. Given the above observations, plus comments contained in the Mental Status section, the results of this examination are regarded as reasonably reliable and valid. TEST RESULTS: 
Quantitative test results are derived from comparisons to age and education corrected cohort normative data, where applicable. Percentiles are included in these instances. Qualitative test results are determined using clinical observations. General Orientation and Awareness:      
Orientation to person, year, month, day of month, day of week, state, and circumstance. Awareness of deficits: Aware Cognitive performance validity testing: Valid MSE = 83/100 (7 percentile) Mildly Impaired Attention/Concentration:                Classification:    
Coding (3 percentile)                 Moderately Impaired Simple visuomotor tracking (14 percentile)              Low Average Digits forward (34 percentile)                Average Digits backward (5 percentile)                Mildly Impaired Visuospatial and Constructional Praxis:    
Figure copy (79 percentile)                                      High Average Line orientation (55 percentile)                                                 Average Language: Picture naming (82 percentile)                               High Average Semantic fluency (12 percentile)                    Low Average Memory and Learning:      
Immediate word list learning (8 percentile)               Mildly Impaired Delayed word list recall (50 percentile)                  Average Delayed word list recognition (<0.1 percentile)               Severely Impaired Immediate story memory (37 percentile)                  Average Delayed story recall (19 percentile)                Low Average Delayed figure recall (9 percentile)                Low Average Cognitive Tests of Executive Functioning:    
Ability to think flexibly, Trail Making B (16 percentile)              Low Average Simple judgment in daily decision making (66 percentile)             Average Complex judgment in daily decision making (<1 percentile)              Severely Impaired Adaptive Behavioral Measure of Daily Functioning: 
 Time:    75 %ile Money/calculations:   75 %ile Communication:    25 %ile Memory:     25 %ile Total:     T= 44 (21%ile) Intellectual and Basic Cognitive Functioning (WAIS-IV): 
ACS Test of pre-morbid functioning reading recognition lower limit estimated WAIS-IV FSIQ score: 
     Estimated full scale IQ:           74 percentile     Average Emotional Functioning: BDI-2:  13 Minimal Depression TRISAH:  05 Minimal Anxiety IMPRESSIONS: 
Mr. Meg Calvillo was seen for a comprehensive neuropsychological evaluation and administered a battery of measures assessing the neurocognitive domains of attention, visuospatial, language, memory, and executive functioning. His performance yielded a total score when averaged across the five domains that was in the low average range. His performance on the individual domains ranged from low average to average, with the exception of his memory domain that was mildly impaired.  More specifically, his list-learning was mildly impaired, while his story learning was in the average range. The story providing contextual structure and cues, likely facilitates the acquisition of the verbal information. On delayed recall, his performance was in the average range for the word list and low average for story recall. Interestingly, his delayed-recognition of the words on the list was significantly below expectations. This may represent a decline in his effort or an inability to discriminate between target words and distractor words. His visual free recall was in the low average range. In short, while his memory performance was his weakest, it was also very variable in his performance. His performance on a measure of Adaptive Daily functioning was in the low average range. Overall, Mr. William Giron profile is consistent with a Mild Cognitive Impairment with memory impairment. DIAGNOSTIC IMPRESSIONS: 
  ICD-10-CM ICD-9-CM 1. Mild cognitive impairment with memory loss  G31.84 331.83 [place] NEUROPSYCHOLOGICAL TST EVAL PHYS/QHP 1ST HOUR  
   [place] NEUROPSYCHOLOGICAL TST EVAL PHYS/QHP EA ADDL HR  
   [place] PSYL/NRPSYCL TST PHYS/QHP 2+ TST 1ST 30 MIN  
   [place] PSYCL/NRPSYCL TST PHYS/QHP 2+ TST EA ADDL 30 MIN  
   [place] PSYCL/NRPSYCL TST TECH 2+ TST 1ST 30 MIN  
   [place] PSYCL/NRPSYCL TST TECH 2+ TST EA ADDL 30 MIN  
 
 
 
RECOMMENDATIONS:  
1. Findings should be reviewed with Mr. William Giron to insure her understanding and discuss the potential implications. 2. Emphasis should be placed on Mr. William Giron obtaining good sleep hygiene and maintaining adequate physical exercise to promote good brain health. 3. Mr. William Giron may benefit from a referral to speech and language pathology to learn compensatory skills for his memory loss. 4. Mr. Radha Stearns is encouraged to seek out various forms of mental stimulation that would help to \"exercise\" her brain. This might include learning a new skill, hobby, travel, attending lectures, or evening reading or listening to podcasts or videos on topics of her interest. 
 
 
Thank you for allowing me the opportunity to assist you in Mr. Byrne's care. Please do not hesitate to contact me should you have additional questions that I may not have addressed. 96136 x 1 
96138 x 1 
96139 x 6 
96132 x 1 
96133 x 2 Juany Sosa, Ph.D., ABPP Licensed Clinical Psychologist 
Neuropsychologist 
Board Certified Rehabilitation Psychologist 
 
 
Time Documentation: 
  
51263 x 1: Neurobehavioral Status Exam/Clinical Interview: (1 hour, (already billed on first date of service) 35206*4 Neuropsych testing/data gathering by Neuropsychologist (35 additional minutes, see above) 95402 x 1 
96139 x 6 Test Administration/Data Gathering By Technician: (3.5 hours). 06515 x 1 (first 30 minutes), 96139 x 7 (each additional 30 minutes) 70267 x 1 
96133 x 1 Testing Evaluation Services by Neuropsychologist (1 hour, 50 minutes) 96132 x 1 (first hour), 96133 x 1 (50 minutes) Definitions:   
  
14094/74632:  Neurobehavioral Status Exam, Clinical interview. Clinical assessment of thinking, reasoning and judgment, by neuropsychologist, both face to face time with patient and time interpreting those test results and reporting, first and subsequent hours) 13137/03704: Neuropsychological Test Administration by Technician/Psychometrist, first 30 minutes and each additional 30 minutes. The above includes: Record review. Review of history provided by patient. Review of collaborative information. Testing by Clinician. Review of raw data. Scoring. Report writing of individual tests administered by Clinician.   Integration of individual tests administered by psychometrist with NSE/testing by clinician, review of records/history/collaborative information, case Conceptualization, treatment planning, clinical decision making, report writing, coordination Of Care. Psychometry test codes as time spent by psychometrist administering and scoring neurocognitive/psychological tests under supervision of neuropsychologist.  Integral services including scoring of raw data, data interpretation, case conceptualization, report writing etcetera were initiated after the patient finished testing/raw data collected and was completed on the date the report was signed. This note will not be viewable in [address] If you have questions, please do not hesitate to call me. I look forward to following your patient along with you.  
 
 
Sincerely, 
 
Marilyn Minor, PHD

## 2020-07-13 NOTE — PROGRESS NOTES
This note will not be viewable in [address] [facility] at [address]    Office:  [phone]  Fax: [phone]                                            Neuropsychological Evaluation Report    Patient Name: Jay Flores  Age: 78 y.o. Gender: male   Occupation:  Handedness: right handed   Presenting Concern: Memory Decline  Primary Care Physician: Gerry Hughes MD  Referring Provider: Eladio Lopez MD    PATIENT HISTORY (OBTAINED DURING INITIAL CLINICAL EVALUATION):    REASON FOR REFERRAL:  This comprehensive and medically necessary neuropsychological assessment was requested to assist with a differential diagnosis of dementia. The use and purpose of this examination, as well as the extent and limitations of confidentiality, were explained prior to obtaining permission to participate. Instructions were provided regarding the necessity to put forth optimal effort and answer questions truthfully in order to obtain reliable and accurate test results.     PERTINENT HISTORY:  Mr. Clint Brennan presented for a neuropsychological assessment at the recommendation of his treating physician secondary to complaints of memory loss, difficulty maintaining sleep, anxiousness, feeling slowed down, and decreased efficiency with technology. It might be noted that Mr. Clint Brennan was not clear why he needed the evaluation and attributed his loss secondary to his age. From a brief review of his medical and personal history there has not been any other significant neurological injury or illness noted or reported. He did not report experiencing depression or anxiety in the past.       Mr. Byrne does not  report any problems at birth or difficulties meeting developmental milestones. He reports that he had an adequate level of family support and was not subject to trauma or abuse as a child.   Mr. Clint Brennan does not report being retain in school or receiving special assistance in any of he classes or subjects. Mr. Evan Kayser completed 16 years of education.     Mr. Byrne does  exercise on a regular basis and does not  maintain a balanced diet. He does  report problems with sleep and does  complain of pain. He does  participate in mentally stimulating activities. Mr. Evan Kayser does not  have concerns regarding prescription medications, family members, place of residence, or financial stressors. Mr. Evan Kayser indicated that he is independent in his instrumental activities of daily living, including shopping, meal preparation, housekeeping, doing laundry, driving a car, managing medications, and finances. METHODS OF ASSESSMENT (Current Evaluation):  Clinician Administered:  Clinical Interview  Review of Medical Records  Clock Drawing Task  Modified Mini-Mental Status Exam (3MS)  Test of Premorbid Functioning  Alves Anxiety Inventory (TRISHA)        Alves Depression Inventory (BDI-2)  Revised Memory and Behavior Checklist    Technician Administered:  NAB Judgment  RBANS-B  Reliable Digit Span  Test of Memory Malingering  Test of Practical Judgment (9-Item)  [place] Functional Living Scale  Trail Making Test  Word Choice Effort Test (ACS)    TEST OBSERVATIONS:  Mr. Evan Kayser arrived promptly for the testing session. Dress and grooming were appropriate; physical presentation was unchanged from that observed during the clinical interview. Speech was fluent, intelligible, and goal-directed. Affect was congruent with the euthymic mood conveyed. Mr. Evan Kayser was adequately cooperative and appeared to put forth good effort throughout this examination. Rapport with the examiner was adequately established and maintained. Minimal prompting was required. Comprehension of test instructions was not problematic.   Performance motivation was objectively measured by four instruments (TOMM, WC Effort, Reliable Digit Span, RBANS EI), and Mr. Farhan Alonzo produced a normal score on these measures. Accordingly, test findings below do not appear to be the product of disingenuous effort. Given the above observations, plus comments contained in the Mental Status section, the results of this examination are regarded as reasonably reliable and valid. TEST RESULTS:  Quantitative test results are derived from comparisons to age and education corrected cohort normative data, where applicable. Percentiles are included in these instances. Qualitative test results are determined using clinical observations. General Orientation and Awareness:       Orientation to person, year, month, day of month, day of week, state, and circumstance.    Awareness of deficits: Aware                   Cognitive performance validity testing: Valid  MSE = 83/100 (7 percentile) Mildly Impaired       Attention/Concentration:                Classification:     Coding (3 percentile)                 Moderately Impaired  Simple visuomotor tracking (14 percentile)              Low Average  Digits forward (34 percentile)                Average  Digits backward (5 percentile)                Mildly Impaired    Visuospatial and Constructional Praxis:     Figure copy (79 percentile)                                      High Average  Line orientation (55 percentile)                                                 Average     Language:         Picture naming (82 percentile)                               High Average  Semantic fluency (12 percentile)                    Low Average    Memory and Learning:       Immediate word list learning (8 percentile)               Mildly Impaired  Delayed word list recall (50 percentile)                  Average  Delayed word list recognition (<0.1 percentile)               Severely Impaired  Immediate story memory (37 percentile)                  Average  Delayed story recall (19 percentile)                Low Average  Delayed figure recall (9 percentile)                Low Average    Cognitive Tests of Executive Functioning:     Ability to think flexibly, Trail Making B (16 percentile)              Low Average  Simple judgment in daily decision making (66 percentile)             Average  Complex judgment in daily decision making (<1 percentile)              Severely Impaired    Adaptive Behavioral Measure of Daily Functioning:   Time:    75 %ile   Money/calculations:   75 %ile  Communication:    25 %ile   Memory:     25 %ile    Total:     T= 44 (21%ile)    Intellectual and Basic Cognitive Functioning (WAIS-IV):  ACS Test of pre-morbid functioning reading recognition lower limit estimated WAIS-IV FSIQ score:       Estimated full scale IQ:           74 percentile     Average    Emotional Functioning:   BDI-2:  13 Minimal Depression   TRISHA:  05 Minimal Anxiety    IMPRESSIONS:  Mr. Natanael Herrera was seen for a comprehensive neuropsychological evaluation and administered a battery of measures assessing the neurocognitive domains of attention, visuospatial, language, memory, and executive functioning. His performance yielded a total score when averaged across the five domains that was in the low average range. His performance on the individual domains ranged from low average to average, with the exception of his memory domain that was mildly impaired. More specifically, his list-learning was mildly impaired, while his story learning was in the average range. The story providing contextual structure and cues, likely facilitates the acquisition of the verbal information. On delayed recall, his performance was in the average range for the word list and low average for story recall. Interestingly, his delayed-recognition of the words on the list was significantly below expectations. This may represent a decline in his effort or an inability to discriminate between target words and distractor words. His visual free recall was in the low average range.  In short, while his memory performance was his weakest, it was also very variable in his performance. His performance on a measure of Adaptive Daily functioning was in the low average range. Overall, Mr. Dasha Hackett profile is consistent with a Mild Cognitive Impairment with memory impairment. DIAGNOSTIC IMPRESSIONS:    ICD-10-CM ICD-9-CM    1. Mild cognitive impairment with memory loss  G31.84 331.83 [place] NEUROPSYCHOLOGICAL TST EVAL PHYS/QHP 1ST HOUR      [place] NEUROPSYCHOLOGICAL TST EVAL PHYS/QHP EA ADDL HR      [place] PSYL/NRPSYCL TST PHYS/QHP 2+ TST 1ST 30 MIN      [place] PSYCL/NRPSYCL TST PHYS/QHP 2+ TST EA ADDL 30 MIN      [place] PSYCL/NRPSYCL TST TECH 2+ TST 1ST 30 MIN      [place] PSYCL/NRPSYCL TST TECH 2+ TST EA ADDL 30 MIN      [place] PSYCL/NRPSYCL TST TECH 2+ TST EA ADDL 30 MIN      [place] PSYCL/NRPSYCL TST TECH 2+ TST EA ADDL 30 MIN      [place] PSYCL/NRPSYCL TST TECH 2+ TST EA ADDL 30 MIN      [place] PSYCL/NRPSYCL TST TECH 2+ TST EA ADDL 30 MIN         RECOMMENDATIONS:   1. Findings should be reviewed with Mr. Dasha Hackett to insure her understanding and discuss the potential implications. 2. Emphasis should be placed on Mr. Dasha Hackett obtaining good sleep hygiene and maintaining adequate physical exercise to promote good brain health. 3. Mr. Dasha Hackett may benefit from a referral to speech and language pathology to learn compensatory skills for his memory loss. 4. Mr. Dasha Hackett is encouraged to seek out various forms of mental stimulation that would help to \"exercise\" her brain. This might include learning a new skill, hobby, travel, attending lectures, or evening reading or listening to podcasts or videos on topics of her interest.      Thank you for allowing me the opportunity to assist you in Mr. Byrne's care. Please do not hesitate to contact me should you have additional questions that I may not have addressed.     87086 x 1  96138 x 1  96139 x 6  96132 x 1  96133 x 2          Tam De Leon, Ph.D., ABPP  Licensed Clinical Psychologist  Neuropsychologist  Board Certified Rehabilitation Psychologist      Time Documentation:     15809 x 1: Neurobehavioral Status Exam/Clinical Interview: (1 hour, (already billed on first date of service)     87281*4 Neuropsych testing/data gathering by Neuropsychologist (35 additional minutes, see above)      96138 x 1  96139 x 6 Test Administration/Data Gathering By Technician: (3.5 hours). 41443 x 1 (first 30 minutes), 87954 x 7 (each additional 30 minutes)     96132 x 1  96133 x 1 Testing Evaluation Services by Neuropsychologist (1 hour, 50 minutes) 96132 x 1 (first hour), 96133 x 1 (50 minutes)     Definitions:       69342/79274:  Neurobehavioral Status Exam, Clinical interview. Clinical assessment of thinking, reasoning and judgment, by neuropsychologist, both face to face time with patient and time interpreting those test results and reporting, first and subsequent hours)     71666/78432: Neuropsychological Test Administration by Technician/Psychometrist, first 30 minutes and each additional 30 minutes. The above includes: Record review. Review of history provided by patient. Review of collaborative information. Testing by Clinician. Review of raw data. Scoring. Report writing of individual tests administered by Clinician. Integration of individual tests administered by psychometrist with NSE/testing by clinician, review of records/history/collaborative information, case Conceptualization, treatment planning, clinical decision making, report writing, coordination Of Care. Psychometry test codes as time spent by psychometrist administering and scoring neurocognitive/psychological tests under supervision of neuropsychologist.  Integral services including scoring of raw data, data interpretation, case conceptualization, report writing etcetera were initiated after the patient finished testing/raw data collected and was completed on the date the report was signed.     This note will not be viewable in [address]

## 2020-07-19 ENCOUNTER — DOCUMENTATION ONLY (OUTPATIENT)
Dept: NEUROLOGY | Age: 80
End: 2020-07-19

## 2020-07-31 RX ORDER — PRAVASTATIN SODIUM 80 MG/1
TABLET ORAL
Qty: 90 TAB | Refills: 3 | Status: SHIPPED | OUTPATIENT
Start: 2020-07-31 | End: 2021-06-21 | Stop reason: SDUPTHER

## 2020-08-08 RX ORDER — SALSALATE 500 MG/1
TABLET, FILM COATED ORAL
Qty: 180 TAB | Refills: 3 | Status: SHIPPED | OUTPATIENT
Start: 2020-08-08 | End: 2021-07-28

## 2021-04-02 ENCOUNTER — OFFICE VISIT (OUTPATIENT)
Dept: INTERNAL MEDICINE CLINIC | Age: 81
End: 2021-04-02
Payer: MEDICARE

## 2021-04-02 VITALS
DIASTOLIC BLOOD PRESSURE: 78 MMHG | SYSTOLIC BLOOD PRESSURE: 126 MMHG | HEART RATE: 70 BPM | RESPIRATION RATE: 16 BRPM | HEIGHT: 72 IN | TEMPERATURE: 97.6 F | OXYGEN SATURATION: 97 % | BODY MASS INDEX: 24.24 KG/M2 | WEIGHT: 179 LBS

## 2021-04-02 DIAGNOSIS — F02.80 ALZHEIMER'S TYPE DEMENTIA WITH LATE ONSET WITHOUT BEHAVIORAL DISTURBANCE (HCC): ICD-10-CM

## 2021-04-02 DIAGNOSIS — N40.0 PROSTATISM: ICD-10-CM

## 2021-04-02 DIAGNOSIS — N18.31 STAGE 3A CHRONIC KIDNEY DISEASE (HCC): ICD-10-CM

## 2021-04-02 DIAGNOSIS — M47.12 OSTEOARTHRITIS OF CERVICAL SPINE WITH MYELOPATHY AND RADICULOPATHY: ICD-10-CM

## 2021-04-02 DIAGNOSIS — M47.22 OSTEOARTHRITIS OF CERVICAL SPINE WITH MYELOPATHY AND RADICULOPATHY: ICD-10-CM

## 2021-04-02 DIAGNOSIS — Z13.31 SCREENING FOR DEPRESSION: ICD-10-CM

## 2021-04-02 DIAGNOSIS — I10 ESSENTIAL HYPERTENSION: Primary | ICD-10-CM

## 2021-04-02 DIAGNOSIS — E78.5 DYSLIPIDEMIA: ICD-10-CM

## 2021-04-02 DIAGNOSIS — M45.9 ANKYLOSING SPONDYLITIS, UNSPECIFIED SITE OF SPINE (HCC): ICD-10-CM

## 2021-04-02 DIAGNOSIS — G30.1 ALZHEIMER'S TYPE DEMENTIA WITH LATE ONSET WITHOUT BEHAVIORAL DISTURBANCE (HCC): ICD-10-CM

## 2021-04-02 DIAGNOSIS — Z00.00 WELLNESS EXAMINATION: ICD-10-CM

## 2021-04-02 DIAGNOSIS — I25.5 CARDIOMYOPATHY, ISCHEMIC: ICD-10-CM

## 2021-04-02 DIAGNOSIS — E03.4 HYPOTHYROIDISM DUE TO ACQUIRED ATROPHY OF THYROID: ICD-10-CM

## 2021-04-02 PROCEDURE — G8420 CALC BMI NORM PARAMETERS: HCPCS | Performed by: INTERNAL MEDICINE

## 2021-04-02 PROCEDURE — G8427 DOCREV CUR MEDS BY ELIG CLIN: HCPCS | Performed by: INTERNAL MEDICINE

## 2021-04-02 PROCEDURE — 99215 OFFICE O/P EST HI 40 MIN: CPT | Performed by: INTERNAL MEDICINE

## 2021-04-02 PROCEDURE — G0439 PPPS, SUBSEQ VISIT: HCPCS | Performed by: INTERNAL MEDICINE

## 2021-04-02 PROCEDURE — G8754 DIAS BP LESS 90: HCPCS | Performed by: INTERNAL MEDICINE

## 2021-04-02 PROCEDURE — G8752 SYS BP LESS 140: HCPCS | Performed by: INTERNAL MEDICINE

## 2021-04-02 PROCEDURE — G8510 SCR DEP NEG, NO PLAN REQD: HCPCS | Performed by: INTERNAL MEDICINE

## 2021-04-02 PROCEDURE — 1101F PT FALLS ASSESS-DOCD LE1/YR: CPT | Performed by: INTERNAL MEDICINE

## 2021-04-02 PROCEDURE — G8536 NO DOC ELDER MAL SCRN: HCPCS | Performed by: INTERNAL MEDICINE

## 2021-04-02 NOTE — PROGRESS NOTES
Chief Complaint   Patient presents with   [facility] Annual Wellness Visit           1. Have you been to the ER, urgent care clinic since your last visit? Hospitalized since your last visit? No    2. Have you seen or consulted any other health care providers outside of the [address] [place] [address] since your last visit? Include any pap smears or colon screening.  No

## 2021-04-03 LAB
ALBUMIN SERPL-MCNC: 4.4 G/DL (ref 3.5–5)
ALBUMIN/GLOB SERPL: 1.5 {RATIO} (ref 1.1–2.2)
ALP SERPL-CCNC: 42 U/L (ref 45–117)
ALT SERPL-CCNC: 34 U/L (ref 12–78)
ANION GAP SERPL CALC-SCNC: 1 MMOL/L (ref 5–15)
APPEARANCE UR: CLEAR
AST SERPL-CCNC: 27 U/L (ref 15–37)
BASOPHILS # BLD: 0 K/UL (ref 0–0.1)
BASOPHILS NFR BLD: 1 % (ref 0–1)
BILIRUB SERPL-MCNC: 1.5 MG/DL (ref 0.2–1)
BILIRUB UR QL: NEGATIVE
BUN SERPL-MCNC: 18 MG/DL (ref 6–20)
BUN/CREAT SERPL: 11 (ref 12–20)
CALCIUM SERPL-MCNC: 9 MG/DL (ref 8.5–10.1)
CHLORIDE SERPL-SCNC: 110 MMOL/L (ref 97–108)
CHOLEST SERPL-MCNC: 108 MG/DL
CO2 SERPL-SCNC: 29 MMOL/L (ref 21–32)
COLOR UR: NORMAL
CREAT SERPL-MCNC: 1.64 MG/DL (ref 0.7–1.3)
DIFFERENTIAL METHOD BLD: ABNORMAL
EOSINOPHIL # BLD: 0.1 K/UL (ref 0–0.4)
EOSINOPHIL NFR BLD: 3 % (ref 0–7)
ERYTHROCYTE [DISTWIDTH] IN BLOOD BY AUTOMATED COUNT: 13.2 % (ref 11.5–14.5)
GLOBULIN SER CALC-MCNC: 2.9 G/DL (ref 2–4)
GLUCOSE SERPL-MCNC: 83 MG/DL (ref 65–100)
GLUCOSE UR STRIP.AUTO-MCNC: NEGATIVE MG/DL
HCT VFR BLD AUTO: 48.1 % (ref 36.6–50.3)
HDLC SERPL-MCNC: 46 MG/DL
HDLC SERPL: 2.3 {RATIO} (ref 0–5)
HGB BLD-MCNC: 14.9 G/DL (ref 12.1–17)
HGB UR QL STRIP: NEGATIVE
IMM GRANULOCYTES # BLD AUTO: 0 K/UL (ref 0–0.04)
IMM GRANULOCYTES NFR BLD AUTO: 0 % (ref 0–0.5)
KETONES UR QL STRIP.AUTO: NEGATIVE MG/DL
LDLC SERPL CALC-MCNC: 52.8 MG/DL (ref 0–100)
LEUKOCYTE ESTERASE UR QL STRIP.AUTO: NEGATIVE
LIPID PROFILE,FLP: NORMAL
LYMPHOCYTES # BLD: 1.4 K/UL (ref 0.8–3.5)
LYMPHOCYTES NFR BLD: 52 % (ref 12–49)
MCH RBC QN AUTO: 28.5 PG (ref 26–34)
MCHC RBC AUTO-ENTMCNC: 31 G/DL (ref 30–36.5)
MCV RBC AUTO: 92 FL (ref 80–99)
MONOCYTES # BLD: 0.3 K/UL (ref 0–1)
MONOCYTES NFR BLD: 10 % (ref 5–13)
NEUTS SEG # BLD: 0.9 K/UL (ref 1.8–8)
NEUTS SEG NFR BLD: 34 % (ref 32–75)
NITRITE UR QL STRIP.AUTO: NEGATIVE
NRBC # BLD: 0 K/UL (ref 0–0.01)
NRBC BLD-RTO: 0 PER 100 WBC
PH UR STRIP: 5 [PH] (ref 5–8)
PLATELET # BLD AUTO: 134 K/UL (ref 150–400)
PLATELET COMMENTS,PCOM: ABNORMAL
PMV BLD AUTO: 10.8 FL (ref 8.9–12.9)
POTASSIUM SERPL-SCNC: 4.9 MMOL/L (ref 3.5–5.1)
PROT SERPL-MCNC: 7.3 G/DL (ref 6.4–8.2)
PROT UR STRIP-MCNC: NEGATIVE MG/DL
PSA SERPL-MCNC: 1.2 NG/ML (ref 0.01–4)
RBC # BLD AUTO: 5.23 M/UL (ref 4.1–5.7)
RBC MORPH BLD: ABNORMAL
RBC MORPH BLD: ABNORMAL
SODIUM SERPL-SCNC: 140 MMOL/L (ref 136–145)
SP GR UR REFRACTOMETRY: 1.01 (ref 1–1.03)
TRIGL SERPL-MCNC: 46 MG/DL (ref ?–150)
TSH SERPL DL<=0.05 MIU/L-ACNC: 1.41 UIU/ML (ref 0.36–3.74)
UROBILINOGEN UR QL STRIP.AUTO: 0.2 EU/DL (ref 0.2–1)
VLDLC SERPL CALC-MCNC: 9.2 MG/DL
WBC # BLD AUTO: 2.7 K/UL (ref 4.1–11.1)

## 2021-04-04 LAB — APO B SERPL-MCNC: 61 MG/DL

## 2021-04-06 RX ORDER — MEMANTINE HYDROCHLORIDE 7 MG/1
7 CAPSULE, EXTENDED RELEASE ORAL DAILY
Qty: 30 CAP | Refills: 0 | Status: SHIPPED | OUTPATIENT
Start: 2021-04-06 | End: 2021-05-02

## 2021-04-29 NOTE — PATIENT INSTRUCTIONS
Medicare Wellness Visit, Male    The best way to live healthy is to have a lifestyle where you eat a well-balanced diet, exercise regularly, limit alcohol use, and quit all forms of tobacco/nicotine, if applicable. Regular preventive services are another way to keep healthy. Preventive services (vaccines, screening tests, monitoring & exams) can help personalize your care plan, which helps you manage your own care. Screening tests can find health problems at the earliest stages, when they are easiest to treat. [facility] follows the current, evidence-based guidelines published by the [facility] ([facility]) when recommending preventive services for our patients. Because we follow these guidelines, sometimes recommendations change over time as research supports it. (For example, a prostate screening blood test is no longer routinely recommended for men with no symptoms). Of course, you and your doctor may decide to screen more often for some diseases, based on your risk and co-morbidities (chronic disease you are already diagnosed with). Preventive services for you include:  - Medicare offers their members a free annual wellness visit, which is time for you and your primary care provider to discuss and plan for your preventive service needs. Take advantage of this benefit every year!  -All adults over age 72 should receive the recommended pneumonia vaccines. Current USPSTF guidelines recommend a series of two vaccines for the best pneumonia protection.   -All adults should have a flu vaccine yearly and tetanus vaccine every 10 years.  -All adults age 48 and older should receive the shingles vaccines (series of two vaccines).        -All adults age 38-68 who are overweight should have a diabetes screening test once every three years.   -Other screening tests & preventive services for persons with diabetes include: an eye exam to screen for diabetic retinopathy, a kidney function test, a foot exam, and stricter control over your cholesterol.   -Cardiovascular screening for adults with routine risk involves an electrocardiogram (ECG) at intervals determined by the provider.   -Colorectal cancer screening should be done for adults age 54-65 with no increased risk factors for colorectal cancer. There are a number of acceptable methods of screening for this type of cancer. Each test has its own benefits and drawbacks. Discuss with your provider what is most appropriate for you during your annual wellness visit. The different tests include: colonoscopy (considered the best screening method), a fecal occult blood test, a fecal DNA test, and sigmoidoscopy.  -All adults born between [facility] should be screened once for Hepatitis C.  -An Abdominal Aortic Aneurysm (AAA) Screening is recommended for men age 73-68 who has ever smoked in their lifetime. Here is a list of your current Health Maintenance items (your personalized list of preventive services) with a due date: There are no preventive care reminders to display for this patient.

## 2021-04-29 NOTE — PROGRESS NOTES
[facility]  [place] [address]  [address] [place] [address]  Phone:  [phone]  Fax: [phone]       Chief Complaint   Patient presents with   [facility] Wellness Visit   . SUBJECTIVE:    Gayathri Sanders is a 78 y.o. male comes in for return visit accompanied by his wife. His memory is getting worse. He often times asks his wife to remember certain things that I ask him about. He has had vague pain on the right side of his shoulder. He is actively followed by ophthalmology for glaucoma. He also has cataracts. Apparently, cataracts are not at the point where surgical extirpation is needed. He has a history of coronary artery disease having had a large myocardial infarction in the past culminating in a CABG being performed. He has been asymptomatic since then with no symptoms suggestive of cardiac ischemia. He also alludes to his application of sign language. Unfortunately, the likelihood of him being able to do anything that is meaningful is going to be impaired because of his problems with short-term memory and his obvious dementia. Current Outpatient Medications   Medication Sig Dispense Refill    salsalate (DISALCID) 500 mg tablet TAKE 1 TAB BY MOUTH TWO (2) TIMES A DAY. 180 Tab 3    pravastatin (PRAVACHOL) 80 mg tablet TAKE 1 TABLET BY MOUTH AT BEDTIME 90 Tab 3    Dutasteride-Tamsulosin 0.5-0.4 mg CM24 TAKE 1 CAPSULE BY MOUTH EVERY DAY 90 Cap 3    Alphagan P 0.1 % ophthalmic solution       GARLIC OIL PO Take  by mouth.  aspirin delayed-release 81 mg tablet Take  by mouth daily.  furosemide (Lasix) 20 mg tablet Take 20 mg by mouth daily.  nitroglycerin (NITROSTAT) 0.4 mg SL tablet 1 Tab by SubLINGual route every five (5) minutes as needed for Chest Pain. Up to 3 doses. 25 Tab 11    VYZULTA 0.024 % drop Apply 1 Drop to eye nightly.  Artifi.  Tear, Hypromellose,-PF (RETAINE HPMC) 0.3 % drop Apply 1 Drop to eye two (2) times a day.  carvedilol (COREG) 3.125 mg tablet Take 6.25 mg by mouth two (2) times a day. 12    lisinopril (PRINIVIL, ZESTRIL) 10 mg tablet Take  by mouth daily.  fish oil-omega-3 fatty acids 340-1,000 mg capsule Take 2 Caps by mouth daily.  multivitamin,tx-iron-ca-min (THERA-M W/ IRON) 27-0.4 mg tab Take 1 Tab by mouth daily.  memantine ER (Namenda XR) 7 mg capsule Take 1 Cap by mouth daily.  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, type 1    CAD (coronary artery disease)     Chronic kidney disease     polycystic kidney disease    Chronic pain     left foot     Past Surgical History:   Procedure Laterality Date    HX COLONOSCOPY      HX ORTHOPAEDIC      bilateral bunionectomy    HX PACEMAKER      [place] CABG, ARTERY-VEIN, THREE  4/18/2007    VASCULAR SURGERY PROCEDURE UNLIST       Allergies   Allergen Reactions    Clopidogrel Other (comments)     bradycardia         REVIEW OF SYSTEMS:  General: negative for - chills or fever  ENT: negative for - headaches, nasal congestion or tinnitus  Respiratory: negative for - cough, hemoptysis, shortness of breath or wheezing  Cardiovascular : negative for - chest pain, edema, palpitations or shortness of breath  Gastrointestinal: negative for - abdominal pain, blood in stools, heartburn or nausea/vomiting  Genito-Urinary: no dysuria, trouble voiding, or hematuria  Musculoskeletal: negative for - gait disturbance, joint pain, joint stiffness or joint swelling  Neurological: no TIA or stroke symptoms  Hematologic: no bruises, no bleeding, no swollen glands  Integument: no lumps, mole changes, nail changes or rash  Endocrine: no malaise/lethargy or unexpected weight changes      Social History     Socioeconomic History    Marital status:      Spouse name: Yovana Cartagena    Number of children: 3    Years of education: 25    Highest education level: Not on file   Occupational History    Occupation: Retired     Employer: [address] [place] [address] [employer]   Tobacco Use    Smoking status: Never Smoker    Smokeless tobacco: Never Used   Substance and Sexual Activity    Alcohol use: No     Comment: quit 12/10/1980    Drug use: No    Sexual activity: Yes     Partners: Female     Family History   Problem Relation Age of Onset    No Known Problems Mother     No Known Problems Father     No Known Problems Sister     Asthma Son    Tuyet Landis Other Brother         old age       OBJECTIVE:    Visit Vitals  /78   Pulse 70   Temp 97.6 °F (36.4 °C) (Oral)   Resp 16   Ht 6' (1.829 m)   Wt 179 lb (81.2 kg)   SpO2 97%   BMI 24.28 kg/m²     CONSTITUTIONAL: well , well nourished, appears age appropriate  EYES: perrla, eom intact  ENMT:moist mucous membranes, pharynx clear  NECK: supple. Thyroid normal  RESPIRATORY: Chest: clear to ascultation and percussion   CARDIOVASCULAR: Heart: regular rate and rhythm  GASTROINTESTINAL: Abdomen: soft, bowel sounds active  HEMATOLOGIC: no pathological lymph nodes palpated  MUSCULOSKELETAL: Extremities: no edema, pulse 1+   INTEGUMENT: No unusual rashes or suspicious skin lesions noted. Nails appear normal.  NEUROLOGIC: non-focal exam   MENTAL STATUS: alert and oriented, appropriate affect      ASSESSMENT:  1. Essential hypertension    2. Cardiomyopathy, ischemic    3. Hypothyroidism due to acquired atrophy of thyroid    4. Alzheimer's type dementia with late onset without behavioral disturbance ([address]    5. 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 Prostatism    7. Ankylosing spondylitis, unspecified site of spine ([address]    8. Stage 3a chronic kidney disease ([address]    9. Dyslipidemia        PLAN:  1. His blood pressure is excellent today. No adjustments are made. I will have to discontinue the ACE inhibitor. 2. He does have an ischemic cardiomyopathy, but this is stable.   He follows up with Cardiology on a regular basis. He is yet to demonstrate any ischemic symptoms. Comments made by the cardiologist indicate a stable status. 3. His dementia is getting worse. I asked him if he wants to get on medication to improve his short-term memory, and he agrees. I did not  use the words Alzheimer's or dementia. I will therefore start him on Namenda 7 mg daily for the first week and then titrate up accordingly till he gets to the maintenance dose of 28 mg.  4. He has ankylosing spondylitis of his cervical and lumbar spine. This is not causing any immediate problems currently. 5. His prostatism is stable. 6. He does have mild chronic renal disease, but most of this is prerenal and it has not been progressive. 7. He remains on a statin because of the secondary cardiovascular risk created by his history of coronary artery disease. .  Orders Placed This Encounter    APOLIPOPROTEIN B    CBC WITH AUTOMATED DIFF    LIPID PANEL    METABOLIC PANEL, COMPREHENSIVE    PROSTATE SPECIFIC AG    URINALYSIS W/ RFLX MICROSCOPIC    TSH 3RD Gissel Stephen MD  This is the Subsequent Medicare Annual Wellness Exam, performed 12 months or more after the Initial AWV or the last Subsequent AWV    I have reviewed the patient's medical history in detail and updated the computerized patient record. Assessment/Plan   Education and counseling provided:  Are appropriate based on today's review and evaluation    1. Essential hypertension  2. Cardiomyopathy, ischemic  -     CBC WITH AUTOMATED DIFF; Future  -     COLLECTION VENOUS BLOOD,VENIPUNCTURE  -     METABOLIC PANEL, COMPREHENSIVE; Future  3. Hypothyroidism due to acquired atrophy of thyroid  -     TSH 3RD GENERATION; Future  4. Alzheimer's type dementia with late onset without behavioral disturbance ([facility]  5. 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 Prostatism  -     PSA, DIAGNOSTIC (PROSTATE SPECIFIC AG);  Future  -     URINALYSIS W/ RFLX MICROSCOPIC; Future  7. Ankylosing spondylitis, unspecified site of spine ([facility]  8. Stage 3a chronic kidney disease ([facility]  9. Dyslipidemia  -     APOLIPOPROTEIN B; Future  -     LIPID PANEL; Future       Depression Risk Factor Screening     3 most recent PHQ Screens 4/2/2021   PHQ Not Done -   Little interest or pleasure in doing things Not at all   Feeling down, depressed, irritable, or hopeless Not at all   Total Score PHQ 2 0       Alcohol Risk Screen    Do you average more than 1 drink per night or more than 7 drinks a week: No    In the past three months have you have had more than 4 drinks containing alcohol on one occasion: No        Functional Ability and Level of Safety    Hearing: Hearing is good. Activities of Daily Living: The home contains: no safety equipment. Patient needs help with:  transportation      Ambulation: with no difficulty     Fall Risk:  Fall Risk Assessment, last 12 mths 4/2/2021   Able to walk? Yes   Fall in past 12 months? 0   Do you feel unsteady? 0   Are you worried about falling 0      Abuse Screen:  Patient is not abused       Cognitive Screening    Has your family/caregiver stated any concerns about your memory: no     Cognitive Screening: Known dementia    Health Maintenance Due   There are no preventive care reminders to display for this patient.     Patient Care Team   Patient Care Team:  Stefani Hubbard MD as PCP - General (Internal Medicine)  Stefani Hubbard MD as PCP - [facility] Empaneled Provider    History     Patient Active Problem List   Diagnosis Code    Ankylosing spondylitis ([facility] M45.9    Prostatism N40.0    Dyslipidemia E78.5    Cramp in limb R25.2    HTN (hypertension) I10    CAD (coronary artery disease) I25.10    Renal azotemia R79.89    Cardiomyopathy, ischemic I25.5    Dizziness R42    Memory deficit R41.3    ASHD (arteriosclerotic heart disease) I25.10    CKD (chronic kidney disease) stage 3, GFR 30-59 ml/min ([facility]) N18.30    Cervical spondylosis M47.812    Gait disturbance R26.9    Urinary urgency R39.15    Bilateral carotid artery stenosis I65.23    Degenerative lumbar spinal stenosis M48.061    Degenerative cervical spinal stenosis M48.02    Altered mental status R41.82    Alzheimer's type dementia with late onset without behavioral disturbance (HCC) G30.1, F02.80    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 M47.12, M47.22    Ataxia R27.0    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    B12 deficiency E53.8    Hypothyroidism due to acquired atrophy of thyroid E03.4    Vitamin D deficiency E55.9    Cerebral microvascular disease I67.89     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 180 Tab 3    pravastatin (PRAVACHOL) 80 mg tablet TAKE 1 TABLET BY MOUTH AT BEDTIME 90 Tab 3    Dutasteride-Tamsulosin 0.5-0.4 mg CM24 TAKE 1 CAPSULE BY MOUTH EVERY DAY 90 Cap 3    Alphagan P 0.1 % ophthalmic solution       GARLIC OIL PO Take  by mouth.  aspirin delayed-release 81 mg tablet Take  by mouth daily.  furosemide (Lasix) 20 mg tablet Take 20 mg by mouth daily.  nitroglycerin (NITROSTAT) 0.4 mg SL tablet 1 Tab by SubLINGual route every five (5) minutes as needed for Chest Pain. Up to 3 doses. 25 Tab 11    VYZULTA 0.024 % drop Apply 1 Drop to eye nightly.  Artifi.  Tear, Hypromellose,-PF (RETAINE HPMC) 0.3 % drop Apply 1 Drop to eye two (2) times a day.  carvedilol (COREG) 3.125 mg tablet Take 6.25 mg by mouth two (2) times a day. 12    lisinopril (PRINIVIL, ZESTRIL) 10 mg tablet Take  by mouth daily.  fish oil-omega-3 fatty acids 340-1,000 mg capsule Take 2 Caps by mouth daily.  multivitamin,tx-iron-ca-min (THERA-M W/ IRON) 27-0.4 mg tab Take 1 Tab by mouth daily.  memantine ER (Namenda XR) 7 mg capsule Take 1 Cap by mouth daily.  30 Cap 0     Allergies   Allergen Reactions    Clopidogrel Other (comments)     bradycardia       Family History   Problem Relation Age of Onset    No Known Problems Mother     No Known Problems Father     No Known Problems Sister    [employer] Asthma Son     Other Brother         old age     Social History     Tobacco Use    Smoking status: Never Smoker    Smokeless tobacco: Never Used   Substance Use Topics    Alcohol use: No     Comment: quit 12/10/1980         Dasha Carney MD

## 2021-05-02 RX ORDER — MEMANTINE HYDROCHLORIDE 7 MG/1
CAPSULE, EXTENDED RELEASE ORAL
Qty: 30 CAP | Refills: 0 | Status: SHIPPED | OUTPATIENT
Start: 2021-05-02 | End: 2021-06-07 | Stop reason: SDUPTHER

## 2021-06-21 RX ORDER — PRAVASTATIN SODIUM 80 MG/1
TABLET ORAL
Qty: 90 TABLET | Refills: 3 | Status: SHIPPED | OUTPATIENT
Start: 2021-06-21 | End: 2022-06-11

## 2021-06-21 RX ORDER — DUTASTERIDE AND TAMSULOSIN HYDROCHLORIDE CAPSULES .5; .4 MG/1; MG/1
CAPSULE ORAL
Qty: 90 CAPSULE | Refills: 3 | Status: SHIPPED | OUTPATIENT
Start: 2021-06-21 | End: 2022-06-11

## 2021-07-28 RX ORDER — SALSALATE 500 MG/1
TABLET, FILM COATED ORAL
Qty: 180 TABLET | Refills: 3 | Status: SHIPPED | OUTPATIENT
Start: 2021-07-28 | End: 2022-06-11

## 2021-08-02 ENCOUNTER — OFFICE VISIT (OUTPATIENT)
Dept: INTERNAL MEDICINE CLINIC | Age: 81
End: 2021-08-02
Payer: MEDICARE

## 2021-08-02 VITALS
RESPIRATION RATE: 14 BRPM | SYSTOLIC BLOOD PRESSURE: 108 MMHG | HEIGHT: 72 IN | OXYGEN SATURATION: 99 % | BODY MASS INDEX: 24.52 KG/M2 | HEART RATE: 77 BPM | WEIGHT: 181 LBS | TEMPERATURE: 98.5 F | DIASTOLIC BLOOD PRESSURE: 63 MMHG

## 2021-08-02 DIAGNOSIS — I25.10 ASHD (ARTERIOSCLEROTIC HEART DISEASE): ICD-10-CM

## 2021-08-02 DIAGNOSIS — I10 ESSENTIAL HYPERTENSION: ICD-10-CM

## 2021-08-02 DIAGNOSIS — R41.3 MEMORY DEFICIT: ICD-10-CM

## 2021-08-02 DIAGNOSIS — N31.9 NEUROGENIC BLADDER: ICD-10-CM

## 2021-08-02 DIAGNOSIS — N18.31 STAGE 3A CHRONIC KIDNEY DISEASE (HCC): ICD-10-CM

## 2021-08-02 DIAGNOSIS — F03.90 DEMENTIA WITHOUT BEHAVIORAL DISTURBANCE, UNSPECIFIED DEMENTIA TYPE: Primary | ICD-10-CM

## 2021-08-02 PROCEDURE — G8420 CALC BMI NORM PARAMETERS: HCPCS | Performed by: INTERNAL MEDICINE

## 2021-08-02 PROCEDURE — 1101F PT FALLS ASSESS-DOCD LE1/YR: CPT | Performed by: INTERNAL MEDICINE

## 2021-08-02 PROCEDURE — G8536 NO DOC ELDER MAL SCRN: HCPCS | Performed by: INTERNAL MEDICINE

## 2021-08-02 PROCEDURE — G8752 SYS BP LESS 140: HCPCS | Performed by: INTERNAL MEDICINE

## 2021-08-02 PROCEDURE — G8432 DEP SCR NOT DOC, RNG: HCPCS | Performed by: INTERNAL MEDICINE

## 2021-08-02 PROCEDURE — G8427 DOCREV CUR MEDS BY ELIG CLIN: HCPCS | Performed by: INTERNAL MEDICINE

## 2021-08-02 PROCEDURE — 99215 OFFICE O/P EST HI 40 MIN: CPT | Performed by: INTERNAL MEDICINE

## 2021-08-02 PROCEDURE — G8754 DIAS BP LESS 90: HCPCS | Performed by: INTERNAL MEDICINE

## 2021-08-02 RX ORDER — MEMANTINE HYDROCHLORIDE 21 MG/1
21 CAPSULE, EXTENDED RELEASE ORAL DAILY
Qty: 30 CAPSULE | Refills: 11 | Status: SHIPPED | OUTPATIENT
Start: 2021-08-02 | End: 2021-12-26 | Stop reason: SDUPTHER

## 2021-08-02 NOTE — PROGRESS NOTES
Chief Complaint   Patient presents with    Hypertension     4 month follow up          1. Have you been to the ER, urgent care clinic since your last visit? Hospitalized since your last visit? No    2. Have you seen or consulted any other health care providers outside of the [address] [place] [address] since your last visit? Include any pap smears or colon screening.  No

## 2021-08-03 LAB
ANION GAP SERPL CALC-SCNC: 4 MMOL/L (ref 5–15)
BUN SERPL-MCNC: 22 MG/DL (ref 6–20)
BUN/CREAT SERPL: 12 (ref 12–20)
CALCIUM SERPL-MCNC: 9.3 MG/DL (ref 8.5–10.1)
CHLORIDE SERPL-SCNC: 110 MMOL/L (ref 97–108)
CO2 SERPL-SCNC: 28 MMOL/L (ref 21–32)
CREAT SERPL-MCNC: 1.79 MG/DL (ref 0.7–1.3)
GLUCOSE SERPL-MCNC: 84 MG/DL (ref 65–100)
POTASSIUM SERPL-SCNC: 5.1 MMOL/L (ref 3.5–5.1)
SODIUM SERPL-SCNC: 142 MMOL/L (ref 136–145)

## 2021-08-08 PROBLEM — N31.9 NEUROGENIC BLADDER: Status: ACTIVE | Noted: 2021-08-08

## 2021-08-08 PROBLEM — F03.90 DEMENTIA WITHOUT BEHAVIORAL DISTURBANCE (HCC): Status: ACTIVE | Noted: 2021-08-08

## 2021-08-08 NOTE — PROGRESS NOTES
[facility]  [place] [address]  [address] [place] [address]  Phone:  [phone]  Fax: [phone]       Chief Complaint   Patient presents with    Hypertension     4 month follow up    . SUBJECTIVE:    Tyrese Tarango is a 78 y.o. male comes in for return visit accompanied by his wife. His memory continues to decrease as far as short-term memory is concerned. She feels that the Namenda did indeed help to a degree. He is currently on 14 mg, and this needs to be progressively increased as I explained to him earlier. He also has increased frequency of urination. This is aggravated by nocturia. His stream flow is reasonable. He has a history of coronary artery disease and denies any ischemic chest discomfort. He does follow up with Cardiology at least once a year, and his most recent visit is quite stable with no changes being made. He has a past history of primary hypertension, dyslipidemia, and mild renal insufficiency. He also has musculoskeletal situations complicated by a limiting spondylitis, which is reasonably stable. Current Outpatient Medications   Medication Sig Dispense Refill    memantine ER (Namenda XR) 21 mg capsule Take 1 Capsule by mouth daily. 30 Capsule 11    salsalate (DISALCID) 500 mg tablet TAKE 1 TAB BY MOUTH TWO (2) TIMES A DAY. 180 Tablet 3    Dutasteride-Tamsulosin 0.5-0.4 mg CM24 TAKE 1 CAPSULE BY MOUTH EVERY DAY 90 Capsule 3    pravastatin (PRAVACHOL) 80 mg tablet TAKE 1 TABLET BY MOUTH AT BEDTIME 90 Tablet 3    Alphagan P 0.1 % ophthalmic solution       GARLIC OIL PO Take  by mouth.  aspirin delayed-release 81 mg tablet Take  by mouth daily.  furosemide (Lasix) 20 mg tablet Take 20 mg by mouth daily.  nitroglycerin (NITROSTAT) 0.4 mg SL tablet 1 Tab by SubLINGual route every five (5) minutes as needed for Chest Pain. Up to 3 doses. 25 Tab 11    VYZULTA 0.024 % drop Apply 1 Drop to eye nightly.       Artifi. Tear, Hypromellose,-PF (RETAINE HPMC) 0.3 % drop Apply 1 Drop to eye two (2) times a day.  carvedilol (COREG) 3.125 mg tablet Take 6.25 mg by mouth two (2) times a day. 12    lisinopril (PRINIVIL, ZESTRIL) 10 mg tablet Take  by mouth daily.  fish oil-omega-3 fatty acids 340-1,000 mg capsule Take 2 Caps by mouth daily.  multivitamin,tx-iron-ca-min (THERA-M W/ IRON) 27-0.4 mg tab Take 1 Tab by mouth daily.        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, type 1    CAD (coronary artery disease)     Chronic kidney disease     polycystic kidney disease    Chronic pain     left foot     Past Surgical History:   Procedure Laterality Date    HX COLONOSCOPY      HX ORTHOPAEDIC      bilateral bunionectomy    HX PACEMAKER      [place] CABG, ARTERY-VEIN, THREE  4/18/2007    VASCULAR SURGERY PROCEDURE UNLIST       Allergies   Allergen Reactions    Clopidogrel Other (comments)     bradycardia         REVIEW OF SYSTEMS:  General: negative for - chills or fever  ENT: negative for - headaches, nasal congestion or tinnitus  Respiratory: negative for - cough, hemoptysis, shortness of breath or wheezing  Cardiovascular : negative for - chest pain, edema, palpitations or shortness of breath  Gastrointestinal: negative for - abdominal pain, blood in stools, heartburn or nausea/vomiting  Genito-Urinary: no dysuria, trouble voiding, or hematuria  Musculoskeletal: negative for - gait disturbance, joint pain, joint stiffness or joint swelling  Neurological: no TIA or stroke symptoms  Hematologic: no bruises, no bleeding, no swollen glands  Integument: no lumps, mole changes, nail changes or rash  Endocrine: no malaise/lethargy or unexpected weight changes      Social History     Socioeconomic History    Marital status:      Spouse name: Pawan Saucedo    Number of children: 3    Years of education: 25    Highest education level: Not on file   Occupational History    Occupation: Retired     Employer: [address] [place] [address]   Tobacco Use    Smoking status: Never Smoker    Smokeless tobacco: Never Used   Vaping Use    Vaping Use: Never used   Substance and Sexual Activity    Alcohol use: No     Comment: quit 12/10/1980    Drug use: No    Sexual activity: Yes     Partners: Female     Social Determinants of Health     Financial Resource Strain:     Difficulty of Paying Living Expenses:    Food Insecurity:     Worried About Running Out of Food in the Last Year:     Ran Out of Food in the Last Year:    Transportation Needs:     Lack of Transportation (Medical):  Lack of Transportation (Non-Medical):    Physical Activity:     Days of Exercise per Week:     Minutes of Exercise per Session:    Stress:     Feeling of Stress :    Social Connections:     Frequency of Communication with Friends and Family:     Frequency of Social Gatherings with Friends and Family:     Attends Nondenominational Services:     Active Member of Clubs or Organizations:     Attends Club or Organization Meetings:     Marital Status:      Family History   Problem Relation Age of Onset    No Known Problems Mother     No Known Problems Father     No Known Problems Sister     Asthma Son     Other Brother         old age       [de-identified]:    Visit Vitals  /63   Pulse 77   Temp 98.5 °F (36.9 °C) (Oral)   Resp 14   Ht 6' (1.829 m)   Wt 181 lb (82.1 kg)   SpO2 99%   BMI 24.55 kg/m²     CONSTITUTIONAL: well , well nourished, appears age appropriate  EYES: perrla, eom intact  ENMT:moist mucous membranes, pharynx clear  NECK: supple.  Thyroid normal  RESPIRATORY: Chest: clear to ascultation and percussion   CARDIOVASCULAR: Heart: regular rate and rhythm  GASTROINTESTINAL: Abdomen: soft, bowel sounds active  HEMATOLOGIC: no pathological lymph nodes palpated  MUSCULOSKELETAL: Extremities: no edema, pulse 1+   INTEGUMENT: No unusual rashes or suspicious skin lesions noted. Nails appear normal.  NEUROLOGIC: non-focal exam   MENTAL STATUS: alert and oriented, appropriate affect      ASSESSMENT:  1. Dementia without behavioral disturbance, unspecified dementia type ([facility]    2. Memory deficit    3. Essential hypertension    4. Neurogenic bladder    5. Stage 3a chronic kidney disease ([address]    6. ASHD (arteriosclerotic heart disease)        PLAN:  1. As far as his presumed dementia is concerned, I will continue to increase his Namenda now to 21 mg to be changed monthly until he gets to a stabilizing dose of 28 mg daily. 2. Blood pressure is reasonable today. No adjustments are made. 3. His coronary artery disease is quite stable also. Records were reviewed from the cardiologist.  4. He appears to have neurogenic bladder, and a urological evaluation would be obtained. 5. His renal status has also been stable. It is unclear as to the exact etiology of the dysfunction, but fortunately is not progressive. .  Orders Placed This Encounter    METABOLIC PANEL, BASIC    REFERRAL TO UROLOGY    memantine ER (Namenda XR) 21 mg capsule         Follow-up and Dispositions    · Return in about 4 months (around 12/2/2021).            Vicente Jacobo MD

## 2021-12-09 ENCOUNTER — OFFICE VISIT (OUTPATIENT)
Dept: INTERNAL MEDICINE CLINIC | Age: 81
End: 2021-12-09
Payer: MEDICARE

## 2021-12-09 VITALS
SYSTOLIC BLOOD PRESSURE: 124 MMHG | BODY MASS INDEX: 24.71 KG/M2 | OXYGEN SATURATION: 98 % | RESPIRATION RATE: 16 BRPM | WEIGHT: 182.4 LBS | DIASTOLIC BLOOD PRESSURE: 82 MMHG | HEART RATE: 71 BPM | HEIGHT: 72 IN | TEMPERATURE: 97.5 F

## 2021-12-09 DIAGNOSIS — N31.9 NEUROGENIC BLADDER: ICD-10-CM

## 2021-12-09 DIAGNOSIS — I25.10 ASHD (ARTERIOSCLEROTIC HEART DISEASE): ICD-10-CM

## 2021-12-09 DIAGNOSIS — G30.1 ALZHEIMER'S TYPE DEMENTIA WITH LATE ONSET WITHOUT BEHAVIORAL DISTURBANCE (HCC): ICD-10-CM

## 2021-12-09 DIAGNOSIS — I25.5 CARDIOMYOPATHY, ISCHEMIC: Primary | ICD-10-CM

## 2021-12-09 DIAGNOSIS — I10 PRIMARY HYPERTENSION: ICD-10-CM

## 2021-12-09 DIAGNOSIS — F02.80 ALZHEIMER'S TYPE DEMENTIA WITH LATE ONSET WITHOUT BEHAVIORAL DISTURBANCE (HCC): ICD-10-CM

## 2021-12-09 DIAGNOSIS — R53.81 PHYSICAL DECONDITIONING: ICD-10-CM

## 2021-12-09 DIAGNOSIS — R48.2 GAIT APRAXIA OF ELDERLY: ICD-10-CM

## 2021-12-09 DIAGNOSIS — N18.31 STAGE 3A CHRONIC KIDNEY DISEASE (HCC): ICD-10-CM

## 2021-12-09 DIAGNOSIS — M45.3 ANKYLOSING SPONDYLITIS OF CERVICOTHORACIC REGION (HCC): ICD-10-CM

## 2021-12-09 PROCEDURE — 1101F PT FALLS ASSESS-DOCD LE1/YR: CPT | Performed by: INTERNAL MEDICINE

## 2021-12-09 PROCEDURE — G8420 CALC BMI NORM PARAMETERS: HCPCS | Performed by: INTERNAL MEDICINE

## 2021-12-09 PROCEDURE — G8536 NO DOC ELDER MAL SCRN: HCPCS | Performed by: INTERNAL MEDICINE

## 2021-12-09 PROCEDURE — G8752 SYS BP LESS 140: HCPCS | Performed by: INTERNAL MEDICINE

## 2021-12-09 PROCEDURE — G8754 DIAS BP LESS 90: HCPCS | Performed by: INTERNAL MEDICINE

## 2021-12-09 PROCEDURE — 99215 OFFICE O/P EST HI 40 MIN: CPT | Performed by: INTERNAL MEDICINE

## 2021-12-09 PROCEDURE — G8427 DOCREV CUR MEDS BY ELIG CLIN: HCPCS | Performed by: INTERNAL MEDICINE

## 2021-12-09 PROCEDURE — G8510 SCR DEP NEG, NO PLAN REQD: HCPCS | Performed by: INTERNAL MEDICINE

## 2021-12-09 NOTE — PROGRESS NOTES
Chief Complaint   Patient presents with    Dementia     4 month follow up          1. Have you been to the ER, urgent care clinic since your last visit? Hospitalized since your last visit? No    2. Have you seen or consulted any other health care providers outside of the [address] [place] [address] since your last visit? Include any pap smears or colon screening.  No

## 2021-12-10 LAB
ANION GAP SERPL CALC-SCNC: 4 MMOL/L (ref 5–15)
BUN SERPL-MCNC: 28 MG/DL (ref 6–20)
BUN/CREAT SERPL: 13 (ref 12–20)
CALCIUM SERPL-MCNC: 9.5 MG/DL (ref 8.5–10.1)
CHLORIDE SERPL-SCNC: 110 MMOL/L (ref 97–108)
CO2 SERPL-SCNC: 25 MMOL/L (ref 21–32)
CREAT SERPL-MCNC: 2.16 MG/DL (ref 0.7–1.3)
GLUCOSE SERPL-MCNC: 87 MG/DL (ref 65–100)
POTASSIUM SERPL-SCNC: 5 MMOL/L (ref 3.5–5.1)
SODIUM SERPL-SCNC: 139 MMOL/L (ref 136–145)

## 2021-12-10 NOTE — PROGRESS NOTES
[facility]  [place] [address]  [address] [place] [address]  Phone:  [phone]  Fax: [phone]       Chief Complaint   Patient presents with    Dementia     4 month follow up    . SUBJECTIVE:    Tiana Valle is a 78 y.o. male comes in for return visit accompanied by his wife. His dementia is gradually getting worse. I suggested to the wife that the Namenda be increased to its maximum dose, but she was somewhat reluctant to do so thinking that it really did not make that much difference and I happen to agree with her. He also has increased urinary frequency and wishes to follow up with the urologist which was not done, but she wants to pursue this. He follows up with his cardiologist and most recently had a [place] which was apparently negative. He is not taking any new medication as it relates to this. He does have existing spondylitis which is stable, as well as dyslipidemia for which she is in a secondary cardiovascular risk prevention status. Additional his walking has been somewhat unsteady and as a result he fell while he was at home. He told the cardiologist this who suggested physical therapy. One of his biggest problems is his physical inactivity which I think is contributing significantly to his gait disturbance. Obviously there may be a neurological contribution also. Current Outpatient Medications   Medication Sig Dispense Refill    memantine ER (Namenda XR) 21 mg capsule Take 1 Capsule by mouth daily. 30 Capsule 11    salsalate (DISALCID) 500 mg tablet TAKE 1 TAB BY MOUTH TWO (2) TIMES A DAY. 180 Tablet 3    Dutasteride-Tamsulosin 0.5-0.4 mg CM24 TAKE 1 CAPSULE BY MOUTH EVERY DAY 90 Capsule 3    pravastatin (PRAVACHOL) 80 mg tablet TAKE 1 TABLET BY MOUTH AT BEDTIME 90 Tablet 3    Alphagan P 0.1 % ophthalmic solution       GARLIC OIL PO Take  by mouth.  aspirin delayed-release 81 mg tablet Take  by mouth daily.       furosemide (Lasix) 20 mg tablet Take 20 mg by mouth daily.  nitroglycerin (NITROSTAT) 0.4 mg SL tablet 1 Tab by SubLINGual route every five (5) minutes as needed for Chest Pain. Up to 3 doses. 25 Tab 11    VYZULTA 0.024 % drop Apply 1 Drop to eye nightly.  Artifi. Tear, Hypromellose,-PF (RETAINE HPMC) 0.3 % drop Apply 1 Drop to eye two (2) times a day.  carvedilol (COREG) 3.125 mg tablet Take 6.25 mg by mouth two (2) times a day. 12    lisinopril (PRINIVIL, ZESTRIL) 10 mg tablet Take  by mouth daily.  fish oil-omega-3 fatty acids 340-1,000 mg capsule Take 2 Caps by mouth daily.  multivitamin,tx-iron-ca-min (THERA-M W/ IRON) 27-0.4 mg tab Take 1 Tab by mouth daily.        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, type 1    CAD (coronary artery disease)     Chronic kidney disease     polycystic kidney disease     Past Surgical History:   Procedure Laterality Date    HX COLONOSCOPY      HX ORTHOPAEDIC      bilateral bunionectomy    HX PACEMAKER      [place] CABG, ARTERY-VEIN, THREE  4/18/2007    VASCULAR SURGERY PROCEDURE UNLIST       Allergies   Allergen Reactions    Clopidogrel Other (comments)     bradycardia         REVIEW OF SYSTEMS:  General: negative for - chills or fever  ENT: negative for - headaches, nasal congestion or tinnitus  Respiratory: negative for - cough, hemoptysis, shortness of breath or wheezing  Cardiovascular : negative for - chest pain, edema, palpitations or shortness of breath  Gastrointestinal: negative for - abdominal pain, blood in stools, heartburn or nausea/vomiting  Genito-Urinary: no dysuria, trouble voiding, or hematuria  Musculoskeletal: negative for - gait disturbance, joint pain, joint stiffness or joint swelling  Neurological: no TIA or stroke symptoms  Hematologic: no bruises, no bleeding, no swollen glands  Integument: no lumps, mole changes, nail changes or rash  Endocrine: no malaise/lethargy or unexpected weight changes      Social History     Socioeconomic History    Marital status:      Spouse name: Calli Sanders    Number of children: 3    Years of education: 25   Occupational History    Occupation: Retired     Employer: [employer]   Tobacco Use    Smoking status: Never Smoker    Smokeless tobacco: Never Used   Vaping Use    Vaping Use: Never used   Substance and Sexual Activity    Alcohol use: No     Comment: quit 12/10/1980    Drug use: No    Sexual activity: Yes     Partners: Female     Family History   Problem Relation Age of Onset    No Known Problems Mother     No Known Problems Father     No Known Problems Sister     Asthma Son    Sabrina Mendiola Other Brother         old age       OBJECTIVE:    Visit Vitals  /82   Pulse 71   Temp 97.5 °F (36.4 °C) (Oral)   Resp 16   Ht 6' (1.829 m)   Wt 182 lb 6.4 oz (82.7 kg)   SpO2 98%   BMI 24.74 kg/m²     CONSTITUTIONAL: well , well nourished, appears age appropriate  EYES: perrla, eom intact  ENMT:moist mucous membranes, pharynx clear  NECK: supple. Thyroid normal  RESPIRATORY: Chest: clear to ascultation and percussion   CARDIOVASCULAR: Heart: regular rate and rhythm  GASTROINTESTINAL: Abdomen: soft, bowel sounds active  HEMATOLOGIC: no pathological lymph nodes palpated  MUSCULOSKELETAL: Extremities: no edema, pulse 1+   INTEGUMENT: No unusual rashes or suspicious skin lesions noted. Nails appear normal.  NEUROLOGIC: non-focal exam   MENTAL STATUS: alert and oriented, appropriate affect      ASSESSMENT:  1. Cardiomyopathy, ischemic    2. Primary hypertension    3. ASHD (arteriosclerotic heart disease)    4. Ankylosing spondylitis of cervicothoracic region ([address]    5. Stage 3a chronic kidney disease ([address]    6. Neurogenic bladder    7. Gait apraxia of elderly    8. Physical deconditioning    9. Alzheimer's type dementia with late onset without behavioral disturbance ([address]        PLAN:  1. The patient has an ischemic cardiomyopathy which is reasonably stable. He follows up with Cardiology for this on a consistent basis. Most recently he had a scan that ruled out ischemia. He appears to be stable from a cardiac perspective. 2. Blood pressure is excellent, no adjustments are made. 3. He does have a history of ankylosing spondylitis which is reasonably stable primarily in the cervicothoracic region. This has indeed not been progressive. 4. He has a history of mild chronic renal disease which is for all practical purposes a pre-renal component related to his diuretic which is furosemide 20 mg daily. 5. He has a neurogenic bladder and I will attempt to have him see an urologist again. Further studies are needed. 6. I think that he does have gait ataxia complicated by physical deconditioning. Physical therapy might be of benefit to him. 7. His dementia is gradually getting worse. There is not much to add at this point. I discussed the possibility of seeing a neurologist, but unfortunately there is not much to add since there are no new medications that will probably have a major impact on his disease course. .  Orders Placed This Encounter    METABOLIC PANEL, BASIC    REFERRAL TO UROLOGY         Follow-up and Dispositions    · Return in about 3 months (around 3/9/2022).            Lucy Nieves MD

## 2021-12-26 DIAGNOSIS — R41.3 MEMORY DEFICIT: ICD-10-CM

## 2021-12-26 RX ORDER — MEMANTINE HYDROCHLORIDE 21 MG/1
21 CAPSULE, EXTENDED RELEASE ORAL DAILY
Qty: 30 CAPSULE | Refills: 11 | Status: SHIPPED | OUTPATIENT
Start: 2021-12-26 | End: 2022-04-11 | Stop reason: SDUPTHER

## 2022-03-18 PROBLEM — R42 DIZZINESS: Status: ACTIVE | Noted: 2018-05-22

## 2022-03-18 PROBLEM — R27.0 ATAXIA: Status: ACTIVE | Noted: 2020-04-09

## 2022-03-18 PROBLEM — M47.22 OSTEOARTHRITIS OF CERVICAL SPINE WITH MYELOPATHY AND RADICULOPATHY: Status: ACTIVE | Noted: 2020-04-09

## 2022-03-18 PROBLEM — M47.12 OSTEOARTHRITIS OF CERVICAL SPINE WITH MYELOPATHY AND RADICULOPATHY: Status: ACTIVE | Noted: 2020-04-09

## 2022-03-18 PROBLEM — E55.9 VITAMIN D DEFICIENCY: Status: ACTIVE | Noted: 2020-04-09

## 2022-03-18 PROBLEM — R41.3 MEMORY DEFICIT: Status: ACTIVE | Noted: 2018-09-24

## 2022-03-19 PROBLEM — R26.9 GAIT DISTURBANCE: Status: ACTIVE | Noted: 2020-01-15

## 2022-03-19 PROBLEM — M48.02 DEGENERATIVE CERVICAL SPINAL STENOSIS: Status: ACTIVE | Noted: 2020-04-09

## 2022-03-19 PROBLEM — R39.15 URINARY URGENCY: Status: ACTIVE | Noted: 2020-01-15

## 2022-03-19 PROBLEM — N18.30 CKD (CHRONIC KIDNEY DISEASE) STAGE 3, GFR 30-59 ML/MIN (HCC): Status: ACTIVE | Noted: 2019-10-29

## 2022-03-19 PROBLEM — R48.2 GAIT APRAXIA OF ELDERLY: Status: ACTIVE | Noted: 2020-04-09

## 2022-03-19 PROBLEM — F03.90 DEMENTIA WITHOUT BEHAVIORAL DISTURBANCE (HCC): Status: ACTIVE | Noted: 2021-08-08

## 2022-03-19 PROBLEM — E53.8 B12 DEFICIENCY: Status: ACTIVE | Noted: 2020-04-09

## 2022-03-19 PROBLEM — M47.812 CERVICAL SPONDYLOSIS: Status: ACTIVE | Noted: 2020-01-15

## 2022-03-19 PROBLEM — G30.1 ALZHEIMER'S TYPE DEMENTIA WITH LATE ONSET WITHOUT BEHAVIORAL DISTURBANCE (HCC): Status: ACTIVE | Noted: 2020-04-09

## 2022-03-19 PROBLEM — I65.23 BILATERAL CAROTID ARTERY STENOSIS: Status: ACTIVE | Noted: 2020-04-09

## 2022-03-19 PROBLEM — F02.80 ALZHEIMER'S TYPE DEMENTIA WITH LATE ONSET WITHOUT BEHAVIORAL DISTURBANCE (HCC): Status: ACTIVE | Noted: 2020-04-09

## 2022-03-19 PROBLEM — I25.10 ASHD (ARTERIOSCLEROTIC HEART DISEASE): Status: ACTIVE | Noted: 2018-09-29

## 2022-03-19 PROBLEM — I67.89 CEREBRAL MICROVASCULAR DISEASE: Status: ACTIVE | Noted: 2020-05-28

## 2022-03-19 PROBLEM — R53.81 PHYSICAL DECONDITIONING: Status: ACTIVE | Noted: 2021-12-09

## 2022-03-20 PROBLEM — N31.9 NEUROGENIC BLADDER: Status: ACTIVE | Noted: 2021-08-08

## 2022-03-20 PROBLEM — M48.061 DEGENERATIVE LUMBAR SPINAL STENOSIS: Status: ACTIVE | Noted: 2020-04-09

## 2022-04-11 ENCOUNTER — OFFICE VISIT (OUTPATIENT)
Dept: INTERNAL MEDICINE CLINIC | Age: 82
End: 2022-04-11
Payer: MEDICARE

## 2022-04-11 VITALS
BODY MASS INDEX: 24.72 KG/M2 | DIASTOLIC BLOOD PRESSURE: 73 MMHG | HEART RATE: 72 BPM | HEIGHT: 72 IN | RESPIRATION RATE: 14 BRPM | SYSTOLIC BLOOD PRESSURE: 111 MMHG | WEIGHT: 182.5 LBS | OXYGEN SATURATION: 98 % | TEMPERATURE: 97.8 F

## 2022-04-11 DIAGNOSIS — F03.90 DEMENTIA WITHOUT BEHAVIORAL DISTURBANCE, UNSPECIFIED DEMENTIA TYPE: ICD-10-CM

## 2022-04-11 DIAGNOSIS — E78.5 DYSLIPIDEMIA: ICD-10-CM

## 2022-04-11 DIAGNOSIS — I25.10 ASHD (ARTERIOSCLEROTIC HEART DISEASE): ICD-10-CM

## 2022-04-11 DIAGNOSIS — R41.3 MEMORY DEFICIT: ICD-10-CM

## 2022-04-11 DIAGNOSIS — M45.3 ANKYLOSING SPONDYLITIS OF CERVICOTHORACIC REGION (HCC): ICD-10-CM

## 2022-04-11 DIAGNOSIS — Z13.31 SCREENING FOR DEPRESSION: ICD-10-CM

## 2022-04-11 DIAGNOSIS — Z00.00 WELLNESS EXAMINATION: ICD-10-CM

## 2022-04-11 DIAGNOSIS — I10 PRIMARY HYPERTENSION: Primary | ICD-10-CM

## 2022-04-11 DIAGNOSIS — N40.0 BENIGN PROSTATIC HYPERPLASIA WITHOUT LOWER URINARY TRACT SYMPTOMS: ICD-10-CM

## 2022-04-11 PROCEDURE — G8427 DOCREV CUR MEDS BY ELIG CLIN: HCPCS | Performed by: INTERNAL MEDICINE

## 2022-04-11 PROCEDURE — 99214 OFFICE O/P EST MOD 30 MIN: CPT | Performed by: INTERNAL MEDICINE

## 2022-04-11 PROCEDURE — G8420 CALC BMI NORM PARAMETERS: HCPCS | Performed by: INTERNAL MEDICINE

## 2022-04-11 PROCEDURE — 1101F PT FALLS ASSESS-DOCD LE1/YR: CPT | Performed by: INTERNAL MEDICINE

## 2022-04-11 PROCEDURE — G8754 DIAS BP LESS 90: HCPCS | Performed by: INTERNAL MEDICINE

## 2022-04-11 PROCEDURE — G8752 SYS BP LESS 140: HCPCS | Performed by: INTERNAL MEDICINE

## 2022-04-11 PROCEDURE — G0439 PPPS, SUBSEQ VISIT: HCPCS | Performed by: INTERNAL MEDICINE

## 2022-04-11 PROCEDURE — G8510 SCR DEP NEG, NO PLAN REQD: HCPCS | Performed by: INTERNAL MEDICINE

## 2022-04-11 PROCEDURE — G8536 NO DOC ELDER MAL SCRN: HCPCS | Performed by: INTERNAL MEDICINE

## 2022-04-11 RX ORDER — MEMANTINE HYDROCHLORIDE 28 MG/1
28 CAPSULE, EXTENDED RELEASE ORAL DAILY
Qty: 30 CAPSULE | Refills: 11 | Status: SHIPPED | OUTPATIENT
Start: 2022-04-11

## 2022-04-11 NOTE — PROGRESS NOTES
[facility]  [facility]  [address] [place] [id number]  Phone:  [phone]  Fax: [phone]       Chief Complaint   Patient presents with   [facility] Wellness Visit   . SUBJECTIVE:    Judy Jason is a 78 y.o. male comes in accompanied by his wife. He does not offer any major complaints, but subsequently did complain about pain in the right side of his neck aggravated with movement. He has known ankylosing spondylitis of the cervical and thoracic spine. His dementia is about the same, but he was concerned about needing to continue the Namenda since it was helpful. He is only taking 21 mg and in reality should be on 28 mg. He denies any chest pain or shortness of breath. He went to an urologist, but declined medication that he gave him because he had side effects. He has a past history of primary hypertension, as well as dyslipidemia. Current Outpatient Medications   Medication Sig Dispense Refill    memantine ER (Namenda XR) 28 mg capsule Take 1 Capsule by mouth daily. 30 Capsule 11    salsalate (DISALCID) 500 mg tablet TAKE 1 TAB BY MOUTH TWO (2) TIMES A DAY. 180 Tablet 3    Dutasteride-Tamsulosin 0.5-0.4 mg CM24 TAKE 1 CAPSULE BY MOUTH EVERY DAY 90 Capsule 3    pravastatin (PRAVACHOL) 80 mg tablet TAKE 1 TABLET BY MOUTH AT BEDTIME 90 Tablet 3    Alphagan P 0.1 % ophthalmic solution       GARLIC OIL PO Take  by mouth.  aspirin delayed-release 81 mg tablet Take  by mouth daily.  furosemide (Lasix) 20 mg tablet Take 20 mg by mouth daily.  nitroglycerin (NITROSTAT) 0.4 mg SL tablet 1 Tab by SubLINGual route every five (5) minutes as needed for Chest Pain. Up to 3 doses. 25 Tab 11    VYZULTA 0.024 % drop Apply 1 Drop to eye nightly.  Artifi. Tear, Hypromellose,-PF (RETAINE HPMC) 0.3 % drop Apply 1 Drop to eye two (2) times a day.  carvedilol (COREG) 3.125 mg tablet Take 6.25 mg by mouth two (2) times a day.   12  lisinopril (PRINIVIL, ZESTRIL) 10 mg tablet Take  by mouth daily.  fish oil-omega-3 fatty acids 340-1,000 mg capsule Take 2 Caps by mouth daily.  multivitamin,tx-iron-ca-min (THERA-M W/ IRON) 27-0.4 mg tab Take 1 Tab by mouth daily.        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, type 1    CAD (coronary artery disease)     Chronic kidney disease     polycystic kidney disease     Past Surgical History:   Procedure Laterality Date    HX COLONOSCOPY      HX ORTHOPAEDIC      bilateral bunionectomy    HX PACEMAKER      [place] CABG, ARTERY-VEIN, THREE  4/18/2007    VASCULAR SURGERY PROCEDURE UNLIST       Allergies   Allergen Reactions    Clopidogrel Other (comments)     bradycardia         REVIEW OF SYSTEMS:  General: negative for - chills or fever  ENT: negative for - headaches, nasal congestion or tinnitus  Respiratory: negative for - cough, hemoptysis, shortness of breath or wheezing  Cardiovascular : negative for - chest pain, edema, palpitations or shortness of breath  Gastrointestinal: negative for - abdominal pain, blood in stools, heartburn or nausea/vomiting  Genito-Urinary: no dysuria, trouble voiding, or hematuria  Musculoskeletal: negative for - gait disturbance, joint pain, joint stiffness or joint swelling  Neurological: no TIA or stroke symptoms  Hematologic: no bruises, no bleeding, no swollen glands  Integument: no lumps, mole changes, nail changes or rash  Endocrine: no malaise/lethargy or unexpected weight changes      Social History     Socioeconomic History    Marital status:      Spouse name: Jg Perez    Number of children: 3    Years of education: 25   Occupational History    Occupation: Retired     Employer: [employer]   Tobacco Use    Smoking status: Never Smoker    Smokeless tobacco: Never Used   Vaping Use    Vaping Use: Never used   Substance and Sexual Activity  Alcohol use: No     Comment: quit 12/10/1980    Drug use: No    Sexual activity: Yes     Partners: Female     Family History   Problem Relation Age of Onset    No Known Problems Mother     No Known Problems Father     No Known Problems Sister     Asthma Son    Estrellita Dooley Other Brother         old age       OBJECTIVE:    Visit Vitals  /73   Pulse 72   Temp 97.8 °F (36.6 °C) (Oral)   Resp 14   Ht 6' (1.829 m)   Wt 182 lb 8 oz (82.8 kg)   SpO2 98%   BMI 24.75 kg/m²     CONSTITUTIONAL: well , well nourished, appears age appropriate  EYES: perrla, eom intact  ENMT:moist mucous membranes, pharynx clear  NECK: supple. Thyroid normal  RESPIRATORY: Chest: clear to ascultation and percussion   CARDIOVASCULAR: Heart: regular rate and rhythm  GASTROINTESTINAL: Abdomen: soft, bowel sounds active  HEMATOLOGIC: no pathological lymph nodes palpated  MUSCULOSKELETAL: Extremities: no edema, pulse 1+   INTEGUMENT: No unusual rashes or suspicious skin lesions noted. Nails appear normal.  NEUROLOGIC: non-focal exam   MENTAL STATUS: alert and oriented, appropriate affect      ASSESSMENT:  1. Primary hypertension    2. ASHD (arteriosclerotic heart disease)    3. Dementia without behavioral disturbance, unspecified dementia type ([address]    4. Memory deficit    5. Ankylosing spondylitis of cervicothoracic region ([address]    6. Dyslipidemia    7. Benign prostatic hyperplasia without lower urinary tract symptoms    8. Screening for depression    9. Wellness examination        PLAN:  1. The patient's blood pressure is excellent today, no adjustments are made. 2. He does have history of coronary artery disease and follows up with his cardiologist at least annually. He denies any chest pain or shortness of breath at this point. 3. His dementia is gradually getting worse. His short term memory is definitely decreasing. I would suggest however continuing the Namenda and increasing the dose to 28 mg which is maximum.   Hopefully this might be of some benefit. 4. He does have ankylosing spondylitis and is most likely causing the discomfort he is having on the right side of his neck. There is a suggestion of possible radiculopathy. Observation for now. He is not a good candidate for NSAID in view of his history of coronary artery disease. 5. He will continue his statin as prescribed. Efficacy and compliance will be assessed today. 6. He did follow up with Urology and I will await the results of his PSA from today. Orders Placed This Encounter    ANNUAL DEPRESSION SCREEN 8-15 MIN    APOLIPOPROTEIN B    CBC WITH AUTOMATED DIFF    LIPID PANEL    METABOLIC PANEL, COMPREHENSIVE    PROSTATE SPECIFIC AG    URINALYSIS W/ RFLX MICROSCOPIC    SAMPLES BEING HELD    memantine ER (Namenda XR) 28 mg capsule         Follow-up and Dispositions    · Return in about 4 months (around 8/11/2022). Yosi Dale MD  This is the Subsequent Medicare Annual Wellness Exam, performed 12 months or more after the Initial AWV or the last Subsequent AWV    I have reviewed the patient's medical history in detail and updated the computerized patient record. Assessment/Plan   Education and counseling provided:  Are appropriate based on today's review and evaluation    1. Primary hypertension  -     CBC WITH AUTOMATED DIFF; Future  -     COLLECTION VENOUS BLOOD,VENIPUNCTURE  -     METABOLIC PANEL, COMPREHENSIVE; Future  -     URINALYSIS W/ RFLX MICROSCOPIC; Future  2. ASHD (arteriosclerotic heart disease)  3. Dementia without behavioral disturbance, unspecified dementia type ([facility]  4. Memory deficit  -     memantine ER (Namenda XR) 28 mg capsule; Take 1 Capsule by mouth daily. , Normal, Disp-30 Capsule, R-11  5. Ankylosing spondylitis of cervicothoracic region ([facility]  6. Dyslipidemia  -     APOLIPOPROTEIN B; Future  -     LIPID PANEL; Future  7.  Benign prostatic hyperplasia without lower urinary tract symptoms  -     PSA, DIAGNOSTIC (PROSTATE SPECIFIC AG); Future  8. Screening for depression  -     DEPRESSION SCREEN ANNUAL  9. Wellness examination       Depression Risk Factor Screening     3 most recent PHQ Screens 4/11/2022   PHQ Not Done -   Little interest or pleasure in doing things Not at all   Feeling down, depressed, irritable, or hopeless Not at all   Total Score PHQ 2 0   Trouble falling or staying asleep, or sleeping too much Not at all   Feeling tired or having little energy Not at all   Poor appetite, weight loss, or overeating Not at all   Feeling bad about yourself - or that you are a failure or have let yourself or your family down Not at all   Trouble concentrating on things such as school, work, reading, or watching TV Not at all   Moving or speaking so slowly that other people could have noticed; or the opposite being so fidgety that others notice Not at all   Thoughts of being better off dead, or hurting yourself in some way Not at all       Alcohol & Drug Abuse Risk Screen    Do you average more than 1 drink per night or more than 7 drinks a week: No    In the past three months have you have had more than 4 drinks containing alcohol on one occasion: No          Functional Ability and Level of Safety    Hearing: Hearing is good. Activities of Daily Living: The home contains: no safety equipment. Patient needs help with:  transportation      Ambulation: with no difficulty     Fall Risk:  Fall Risk Assessment, last 12 mths 4/11/2022   Able to walk? Yes   Fall in past 12 months? 0   Do you feel unsteady?  0   Are you worried about falling 0      Abuse Screen:  Patient is not abused       Cognitive Screening    Has your family/caregiver stated any concerns about your memory: yes - Known dementia     Cognitive Screening: Known dementia    Health Maintenance Due     Health Maintenance Due   Topic Date Due    Shingrix Vaccine Age 49> (1 of 2) Never done    COVID-19 Vaccine (3 - Booster for [employer] series) 07/27/2021       Patient Care Team Patient Care Team:  Kaylie Cox MD as PCP - General (Internal Medicine)  Kaylie Cox MD as PCP - [facility]    History     Patient Active Problem List   Diagnosis Code    Ankylosing spondylitis ([facility] M45.9    Prostatism N40.0    Dyslipidemia E78.5    HTN (hypertension) I10    Renal azotemia R79.89    Cardiomyopathy, ischemic I25.5    Dizziness R42    Memory deficit R41.3    ASHD (arteriosclerotic heart disease) I25.10    CKD (chronic kidney disease) stage 3, GFR 30-59 ml/min ([facility]) N18.30    Cervical spondylosis M47.812    Gait disturbance R26.9    Urinary urgency R39.15    Bilateral carotid artery stenosis I65.23    Degenerative lumbar spinal stenosis M48.061    Degenerative cervical spinal stenosis M48.02    Alzheimer's type dementia with late onset without behavioral disturbance ([facility]) G30.1, F02.80    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 30 Capsule 11    salsalate (DISALCID) 500 mg tablet TAKE 1 TAB BY MOUTH TWO (2) TIMES A DAY.  180 Tablet 3    Dutasteride-Tamsulosin 0.5-0.4 mg CM24 TAKE 1 CAPSULE BY MOUTH EVERY DAY 90 Capsule 3    pravastatin (PRAVACHOL) 80 mg tablet TAKE 1 TABLET BY MOUTH AT BEDTIME 90 Tablet 3    Alphagan P 0.1 % ophthalmic solution       GARLIC OIL PO Take  by mouth.  aspirin delayed-release 81 mg tablet Take  by mouth daily.  furosemide (Lasix) 20 mg tablet Take 20 mg by mouth daily.  nitroglycerin (NITROSTAT) 0.4 mg SL tablet 1 Tab by SubLINGual route every five (5) minutes as needed for Chest Pain. Up to 3 doses. 25 Tab 11    VYZULTA 0.024 % drop Apply 1 Drop to eye nightly.  Artifi. Tear, Hypromellose,-PF (RETAINE HPMC) 0.3 % drop Apply 1 Drop to eye two (2) times a day.  carvedilol (COREG) 3.125 mg tablet Take 6.25 mg by mouth two (2) times a day. 12    lisinopril (PRINIVIL, ZESTRIL) 10 mg tablet Take  by mouth daily.  fish oil-omega-3 fatty acids 340-1,000 mg capsule Take 2 Caps by mouth daily.  multivitamin,tx-iron-ca-min (THERA-M W/ IRON) 27-0.4 mg tab Take 1 Tab by mouth daily.        Allergies   Allergen Reactions    Clopidogrel Other (comments)     bradycardia       Family History   Problem Relation Age of Onset    No Known Problems Mother     No Known Problems Father     No Known Problems Sister    [place] Asthma Son     Other Brother         old age     Social History     Tobacco Use    Smoking status: Never Smoker    Smokeless tobacco: Never Used   Substance Use Topics    Alcohol use: No     Comment: quit 12/10/1980         Leo Brown MD

## 2022-04-11 NOTE — PROGRESS NOTES
Chief Complaint   Patient presents with   [facility] Annual Wellness Visit         1. Have you been to the ER, urgent care clinic since your last visit? Hospitalized since your last visit? No    2. Have you seen or consulted any other health care providers outside of the [address] [place] [address] since your last visit? Include any pap smears or colon screening.  No

## 2022-04-12 LAB
ALBUMIN SERPL-MCNC: 4 G/DL (ref 3.5–5)
ALBUMIN/GLOB SERPL: 1.4 {RATIO} (ref 1.1–2.2)
ALP SERPL-CCNC: 40 U/L (ref 45–117)
ALT SERPL-CCNC: 35 U/L (ref 12–78)
ANION GAP SERPL CALC-SCNC: 8 MMOL/L (ref 5–15)
APPEARANCE UR: CLEAR
AST SERPL-CCNC: 29 U/L (ref 15–37)
BASOPHILS # BLD: 0 K/UL (ref 0–0.1)
BASOPHILS NFR BLD: 1 % (ref 0–1)
BILIRUB SERPL-MCNC: 1.2 MG/DL (ref 0.2–1)
BILIRUB UR QL: NEGATIVE
BUN SERPL-MCNC: 28 MG/DL (ref 6–20)
BUN/CREAT SERPL: 13 (ref 12–20)
CALCIUM SERPL-MCNC: 8.9 MG/DL (ref 8.5–10.1)
CHLORIDE SERPL-SCNC: 108 MMOL/L (ref 97–108)
CHOLEST SERPL-MCNC: 107 MG/DL
CO2 SERPL-SCNC: 26 MMOL/L (ref 21–32)
COLOR UR: NORMAL
CREAT SERPL-MCNC: 2.11 MG/DL (ref 0.7–1.3)
DIFFERENTIAL METHOD BLD: ABNORMAL
EOSINOPHIL # BLD: 0.1 K/UL (ref 0–0.4)
EOSINOPHIL NFR BLD: 2 % (ref 0–7)
ERYTHROCYTE [DISTWIDTH] IN BLOOD BY AUTOMATED COUNT: 13.2 % (ref 11.5–14.5)
GLOBULIN SER CALC-MCNC: 2.9 G/DL (ref 2–4)
GLUCOSE SERPL-MCNC: 81 MG/DL (ref 65–100)
GLUCOSE UR STRIP.AUTO-MCNC: NEGATIVE MG/DL
HCT VFR BLD AUTO: 48.4 % (ref 36.6–50.3)
HDLC SERPL-MCNC: 34 MG/DL
HDLC SERPL: 3.1 {RATIO} (ref 0–5)
HGB BLD-MCNC: 14.3 G/DL (ref 12.1–17)
HGB UR QL STRIP: NEGATIVE
IMM GRANULOCYTES # BLD AUTO: 0 K/UL (ref 0–0.04)
IMM GRANULOCYTES NFR BLD AUTO: 0 % (ref 0–0.5)
KETONES UR QL STRIP.AUTO: NEGATIVE MG/DL
LDLC SERPL CALC-MCNC: 58.2 MG/DL (ref 0–100)
LEUKOCYTE ESTERASE UR QL STRIP.AUTO: NEGATIVE
LYMPHOCYTES # BLD: 1.8 K/UL (ref 0.8–3.5)
LYMPHOCYTES NFR BLD: 51 % (ref 12–49)
MCH RBC QN AUTO: 28.9 PG (ref 26–34)
MCHC RBC AUTO-ENTMCNC: 29.5 G/DL (ref 30–36.5)
MCV RBC AUTO: 98 FL (ref 80–99)
MONOCYTES # BLD: 0.4 K/UL (ref 0–1)
MONOCYTES NFR BLD: 12 % (ref 5–13)
NEUTS SEG # BLD: 1.2 K/UL (ref 1.8–8)
NEUTS SEG NFR BLD: 34 % (ref 32–75)
NITRITE UR QL STRIP.AUTO: NEGATIVE
NRBC # BLD: 0.02 K/UL (ref 0–0.01)
NRBC BLD-RTO: 0.6 PER 100 WBC
PH UR STRIP: 5 [PH] (ref 5–8)
PLATELET # BLD AUTO: 150 K/UL (ref 150–400)
PMV BLD AUTO: 10.9 FL (ref 8.9–12.9)
POTASSIUM SERPL-SCNC: 4.8 MMOL/L (ref 3.5–5.1)
PROT SERPL-MCNC: 6.9 G/DL (ref 6.4–8.2)
PROT UR STRIP-MCNC: NEGATIVE MG/DL
PSA SERPL-MCNC: 1.1 NG/ML (ref 0.01–4)
RBC # BLD AUTO: 4.94 M/UL (ref 4.1–5.7)
SODIUM SERPL-SCNC: 142 MMOL/L (ref 136–145)
SP GR UR REFRACTOMETRY: 1.01 (ref 1–1.03)
TRIGL SERPL-MCNC: 74 MG/DL (ref ?–150)
UROBILINOGEN UR QL STRIP.AUTO: 0.2 EU/DL (ref 0.2–1)
VLDLC SERPL CALC-MCNC: 14.8 MG/DL
WBC # BLD AUTO: 3.5 K/UL (ref 4.1–11.1)

## 2022-04-13 LAB — APO B SERPL-MCNC: 61 MG/DL

## 2022-04-30 NOTE — PATIENT INSTRUCTIONS
Medicare Wellness Visit, Male    The best way to live healthy is to have a lifestyle where you eat a well-balanced diet, exercise regularly, limit alcohol use, and quit all forms of tobacco/nicotine, if applicable. Regular preventive services are another way to keep healthy. Preventive services (vaccines, screening tests, monitoring & exams) can help personalize your care plan, which helps you manage your own care. Screening tests can find health problems at the earliest stages, when they are easiest to treat. [facility] follows the current, evidence-based guidelines published by the [facility] ([facility]) when recommending preventive services for our patients. Because we follow these guidelines, sometimes recommendations change over time as research supports it. (For example, a prostate screening blood test is no longer routinely recommended for men with no symptoms). Of course, you and your doctor may decide to screen more often for some diseases, based on your risk and co-morbidities (chronic disease you are already diagnosed with). Preventive services for you include:  - Medicare offers their members a free annual wellness visit, which is time for you and your primary care provider to discuss and plan for your preventive service needs. Take advantage of this benefit every year!  -All adults over age 72 should receive the recommended pneumonia vaccines. Current USPSTF guidelines recommend a series of two vaccines for the best pneumonia protection.   -All adults should have a flu vaccine yearly and tetanus vaccine every 10 years.  -All adults age 48 and older should receive the shingles vaccines (series of two vaccines).        -All adults age 38-68 who are overweight should have a diabetes screening test once every three years.   -Other screening tests & preventive services for persons with diabetes include: an eye exam to screen for diabetic retinopathy, a kidney function test, a foot exam, and stricter control over your cholesterol.   -Cardiovascular screening for adults with routine risk involves an electrocardiogram (ECG) at intervals determined by the provider.   -Colorectal cancer screening should be done for adults age 54-65 with no increased risk factors for colorectal cancer. There are a number of acceptable methods of screening for this type of cancer. Each test has its own benefits and drawbacks. Discuss with your provider what is most appropriate for you during your annual wellness visit. The different tests include: colonoscopy (considered the best screening method), a fecal occult blood test, a fecal DNA test, and sigmoidoscopy.  -All adults born between [facility] should be screened once for Hepatitis C.  -An Abdominal Aortic Aneurysm (AAA) Screening is recommended for men age 73-68 who has ever smoked in their lifetime.      Here is a list of your current Health Maintenance items (your personalized list of preventive services) with a due date:  Health Maintenance Due   Topic Date Due    Shingles Vaccine (1 of 2) Never done    COVID-19 Vaccine (3 - Booster for Haji Peter series) 07/27/2021

## 2022-06-11 RX ORDER — SALSALATE 500 MG/1
TABLET, FILM COATED ORAL
Qty: 180 TABLET | Refills: 3 | Status: SHIPPED | OUTPATIENT
Start: 2022-06-11

## 2022-06-11 RX ORDER — PRAVASTATIN SODIUM 80 MG/1
TABLET ORAL
Qty: 90 TABLET | Refills: 3 | Status: SHIPPED | OUTPATIENT
Start: 2022-06-11

## 2022-06-11 RX ORDER — DUTASTERIDE AND TAMSULOSIN HYDROCHLORIDE CAPSULES .5; .4 MG/1; MG/1
CAPSULE ORAL
Qty: 90 CAPSULE | Refills: 3 | Status: SHIPPED | OUTPATIENT
Start: 2022-06-11

## 2022-08-11 ENCOUNTER — OFFICE VISIT (OUTPATIENT)
Dept: INTERNAL MEDICINE CLINIC | Age: 82
End: 2022-08-11
Payer: MEDICARE

## 2022-08-11 VITALS
HEIGHT: 72 IN | DIASTOLIC BLOOD PRESSURE: 76 MMHG | SYSTOLIC BLOOD PRESSURE: 115 MMHG | HEART RATE: 71 BPM | RESPIRATION RATE: 16 BRPM | WEIGHT: 171.4 LBS | TEMPERATURE: 98.1 F | BODY MASS INDEX: 23.22 KG/M2 | OXYGEN SATURATION: 100 %

## 2022-08-11 DIAGNOSIS — R53.81 PHYSICAL DECONDITIONING: ICD-10-CM

## 2022-08-11 DIAGNOSIS — N40.0 PROSTATISM: ICD-10-CM

## 2022-08-11 DIAGNOSIS — M45.3 ANKYLOSING SPONDYLITIS OF CERVICOTHORACIC REGION (HCC): ICD-10-CM

## 2022-08-11 DIAGNOSIS — I10 PRIMARY HYPERTENSION: ICD-10-CM

## 2022-08-11 DIAGNOSIS — E78.5 DYSLIPIDEMIA: ICD-10-CM

## 2022-08-11 DIAGNOSIS — G30.1 ALZHEIMER'S TYPE DEMENTIA WITH LATE ONSET WITHOUT BEHAVIORAL DISTURBANCE (HCC): ICD-10-CM

## 2022-08-11 DIAGNOSIS — I25.10 ASHD (ARTERIOSCLEROTIC HEART DISEASE): ICD-10-CM

## 2022-08-11 DIAGNOSIS — N18.31 STAGE 3A CHRONIC KIDNEY DISEASE (HCC): Primary | ICD-10-CM

## 2022-08-11 DIAGNOSIS — F02.80 ALZHEIMER'S TYPE DEMENTIA WITH LATE ONSET WITHOUT BEHAVIORAL DISTURBANCE (HCC): ICD-10-CM

## 2022-08-11 LAB
ANION GAP SERPL CALC-SCNC: 4 MMOL/L (ref 5–15)
BUN SERPL-MCNC: 28 MG/DL (ref 6–20)
BUN/CREAT SERPL: 12 (ref 12–20)
CALCIUM SERPL-MCNC: 9.3 MG/DL (ref 8.5–10.1)
CHLORIDE SERPL-SCNC: 108 MMOL/L (ref 97–108)
CO2 SERPL-SCNC: 28 MMOL/L (ref 21–32)
CREAT SERPL-MCNC: 2.27 MG/DL (ref 0.7–1.3)
CREAT UR-MCNC: 170 MG/DL
GLUCOSE SERPL-MCNC: 91 MG/DL (ref 65–100)
POTASSIUM SERPL-SCNC: 4.9 MMOL/L (ref 3.5–5.1)
PROT UR-MCNC: 19 MG/DL (ref 0–11.9)
SODIUM SERPL-SCNC: 140 MMOL/L (ref 136–145)

## 2022-08-11 PROCEDURE — 1101F PT FALLS ASSESS-DOCD LE1/YR: CPT | Performed by: INTERNAL MEDICINE

## 2022-08-11 PROCEDURE — G8510 SCR DEP NEG, NO PLAN REQD: HCPCS | Performed by: INTERNAL MEDICINE

## 2022-08-11 PROCEDURE — G8752 SYS BP LESS 140: HCPCS | Performed by: INTERNAL MEDICINE

## 2022-08-11 PROCEDURE — G8536 NO DOC ELDER MAL SCRN: HCPCS | Performed by: INTERNAL MEDICINE

## 2022-08-11 PROCEDURE — 1123F ACP DISCUSS/DSCN MKR DOCD: CPT | Performed by: INTERNAL MEDICINE

## 2022-08-11 PROCEDURE — 99214 OFFICE O/P EST MOD 30 MIN: CPT | Performed by: INTERNAL MEDICINE

## 2022-08-11 PROCEDURE — G8427 DOCREV CUR MEDS BY ELIG CLIN: HCPCS | Performed by: INTERNAL MEDICINE

## 2022-08-11 PROCEDURE — G8754 DIAS BP LESS 90: HCPCS | Performed by: INTERNAL MEDICINE

## 2022-08-11 PROCEDURE — G8420 CALC BMI NORM PARAMETERS: HCPCS | Performed by: INTERNAL MEDICINE

## 2022-08-11 NOTE — PROGRESS NOTES
Chief Complaint   Patient presents with    Hypertension     Patient is here for a follow up     Leg Injury     Patient states he has a wound on left leg. 1. Have you been to the ER, urgent care clinic since your last visit? Hospitalized since your last visit? No    2. Have you seen or consulted any other health care providers outside of the [address] [place] [address] since your last visit? Include any pap smears or colon screening.  No

## 2022-08-11 NOTE — PROGRESS NOTES
[address] [facility]  [place] [address]  Phone:  [phone]  Fax: [phone]       Chief Complaint   Patient presents with    Hypertension     Patient is here for a follow up     Leg Injury     Patient states he has a wound on left leg. .      SUBJECTIVE:    Ti Unger is a 78 y.o. male comes in for return visit accompanied by his wife. He is not doing much walking. His feet bother him so he confines his activities to home and as a result his deconditioning is getting worse. He has had a lesion on the anterior aspect of his left tibial region. He denies any chest pain or shortness of breath. He follows up with Cardiology at least once a year. He has a past history of primary hypertension, dyslipidemia and mild renal insufficiency. His dementia is gradually getting worse. Short term memory is decreasing significantly. Current Outpatient Medications   Medication Sig Dispense Refill    pravastatin (PRAVACHOL) 80 mg tablet TAKE 1 TABLET BY MOUTH EVERYDAY AT BEDTIME 90 Tablet 3    salsalate (DISALCID) 500 mg tablet TAKE 1 TAB BY MOUTH TWO (2) TIMES A DAY. 180 Tablet 3    Dutasteride-Tamsulosin 0.5-0.4 mg CM24 TAKE 1 CAPSULE BY MOUTH EVERY DAY 90 Capsule 3    memantine ER (Namenda XR) 28 mg capsule Take 1 Capsule by mouth daily. 30 Capsule 11    Alphagan P 0.1 % ophthalmic solution       GARLIC OIL PO Take  by mouth. aspirin delayed-release 81 mg tablet Take  by mouth daily. furosemide (LASIX) 20 mg tablet Take 20 mg by mouth daily. nitroglycerin (NITROSTAT) 0.4 mg SL tablet 1 Tab by SubLINGual route every five (5) minutes as needed for Chest Pain. Up to 3 doses. 25 Tab 11    VYZULTA 0.024 % drop Apply 1 Drop to eye nightly. Artifi. Tear, Hypromellose,-PF 0.3 % drop Apply 1 Drop to eye two (2) times a day. carvedilol (COREG) 3.125 mg tablet Take 6.25 mg by mouth two (2) times a day.   12    lisinopril (PRINIVIL, ZESTRIL) 10 mg tablet Take  by mouth daily. fish oil-omega-3 fatty acids 340-1,000 mg capsule Take 2 Caps by mouth daily. multivitamin,tx-iron-ca-min (THERA-M W/ IRON) 27-0.4 mg tab Take 1 Tab by mouth daily.        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, type 1    CAD (coronary artery disease)     Chronic kidney disease     polycystic kidney disease     Past Surgical History:   Procedure Laterality Date    HX COLONOSCOPY      HX ORTHOPAEDIC      bilateral bunionectomy    HX PACEMAKER      MD CABG, ARTERY-VEIN, THREE  4/18/2007    VASCULAR SURGERY PROCEDURE UNLIST       Allergies   Allergen Reactions    Clopidogrel Other (comments)     bradycardia         REVIEW OF SYSTEMS:  General: negative for - chills or fever  ENT: negative for - headaches, nasal congestion or tinnitus  Respiratory: negative for - cough, hemoptysis, shortness of breath or wheezing  Cardiovascular : negative for - chest pain, edema, palpitations or shortness of breath  Gastrointestinal: negative for - abdominal pain, blood in stools, heartburn or nausea/vomiting  Genito-Urinary: no dysuria, trouble voiding, or hematuria  Musculoskeletal: negative for - gait disturbance, joint pain, joint stiffness or joint swelling  Neurological: no TIA or stroke symptoms  Hematologic: no bruises, no bleeding, no swollen glands  Integument: no lumps, mole changes, nail changes or rash  Endocrine: no malaise/lethargy or unexpected weight changes      Social History     Socioeconomic History    Marital status:      Spouse name: Boris Meckel    Number of children: 3    Years of education: 25   Occupational History    Occupation: Retired     Employer: [address] [place] [address]   Tobacco Use    Smoking status: Never    Smokeless tobacco: Never   Vaping Use    Vaping Use: Never used   Substance and Sexual Activity    Alcohol use: No     Comment: quit 12/10/1980 Drug use: No    Sexual activity: Yes     Partners: Female     Family History   Problem Relation Age of Onset    No Known Problems Mother     No Known Problems Father     No Known Problems Sister     Asthma Son     Other Brother         old age       OBJECTIVE:    Visit Vitals  /76   Pulse 71   Temp 98.1 °F (36.7 °C)   Resp 16   Ht 6' (1.829 m)   Wt 171 lb 6.4 oz (77.7 kg)   SpO2 100%   BMI 23.25 kg/m²     CONSTITUTIONAL: well , well nourished, appears age appropriate  EYES: perrla, eom intact  ENMT:moist mucous membranes, pharynx clear  NECK: supple. Thyroid normal  RESPIRATORY: Chest: clear to ascultation and percussion   CARDIOVASCULAR: Heart: regular rate and rhythm  GASTROINTESTINAL: Abdomen: soft, bowel sounds active  HEMATOLOGIC: no pathological lymph nodes palpated  MUSCULOSKELETAL: Extremities: no edema, pulse 1+   INTEGUMENT: No unusual rashes or suspicious skin lesions noted. Nails appear normal.  NEUROLOGIC: non-focal exam   MENTAL STATUS: alert and oriented, appropriate affect      ASSESSMENT:  1. Stage 3a chronic kidney disease ([address]    2. ASHD (arteriosclerotic heart disease)    3. Primary hypertension    4. Alzheimer's type dementia with late onset without behavioral disturbance ([address]    5. Prostatism    6. Ankylosing spondylitis of cervicothoracic region ([address]    7. Physical deconditioning    8. Dyslipidemia        PLAN:  1. The patient does have mild renal insufficiency. This was felt initially to be predominantly pre-renal.  I will continue to monitor this and check for nephron damage. 2. His coronary artery disease appears to be stable with no suggestion of shortness of breath, orthopnea, PND or chest discomfort. He remains on his cardioactive medications. 3. Blood pressure is excellent today, no adjustments are made. 4. As far as his dementia is concerned, he is no worse than he has been before.   5. He does have prostatism saw urologist, who prescribed something for him primarily with the principal symptom being increased frequency of urination, but once he heard about the side effects, he declined. 6. He has ankylosing spondylitis, which is not progressive. This is stable for now. 7. He needs to increase his physical activity preferably outside on a more consistent basis. His walking is quite slow and a bit unsteady. He uses a cane for walking. 8. He has an increased cardiovascular risk and remains on his statin. Efficacy and compliance will be assessed. Orders Placed This Encounter    METABOLIC PANEL, BASIC    CREATININE, UR, RANDOM    PROTEIN URINE, RANDOM ([facility] Only)    APOLIPOPROTEIN B         Follow-up and Dispositions    Return in about 4 months (around 12/11/2022).            Faby Kern MD

## 2022-12-12 ENCOUNTER — OFFICE VISIT (OUTPATIENT)
Dept: INTERNAL MEDICINE CLINIC | Age: 82
End: 2022-12-12
Payer: MEDICARE

## 2022-12-12 VITALS
SYSTOLIC BLOOD PRESSURE: 119 MMHG | DIASTOLIC BLOOD PRESSURE: 80 MMHG | BODY MASS INDEX: 24.61 KG/M2 | HEART RATE: 75 BPM | HEIGHT: 72 IN | TEMPERATURE: 97.3 F | OXYGEN SATURATION: 100 % | RESPIRATION RATE: 16 BRPM | WEIGHT: 181.7 LBS

## 2022-12-12 DIAGNOSIS — E78.5 DYSLIPIDEMIA: ICD-10-CM

## 2022-12-12 DIAGNOSIS — I87.2 VENOUS INSUFFICIENCY: ICD-10-CM

## 2022-12-12 DIAGNOSIS — I25.10 ASHD (ARTERIOSCLEROTIC HEART DISEASE): ICD-10-CM

## 2022-12-12 DIAGNOSIS — G30.1 ALZHEIMER'S TYPE DEMENTIA WITH LATE ONSET WITHOUT BEHAVIORAL DISTURBANCE (HCC): ICD-10-CM

## 2022-12-12 DIAGNOSIS — R79.89 RENAL AZOTEMIA: ICD-10-CM

## 2022-12-12 DIAGNOSIS — F02.80 ALZHEIMER'S TYPE DEMENTIA WITH LATE ONSET WITHOUT BEHAVIORAL DISTURBANCE (HCC): ICD-10-CM

## 2022-12-12 DIAGNOSIS — R26.81 UNSTEADY GAIT: ICD-10-CM

## 2022-12-12 DIAGNOSIS — F32.9 REACTIVE DEPRESSION: Primary | ICD-10-CM

## 2022-12-12 LAB
ANION GAP SERPL CALC-SCNC: 6 MMOL/L (ref 5–15)
BNP SERPL-MCNC: 1514 PG/ML
BUN SERPL-MCNC: 30 MG/DL (ref 6–20)
BUN/CREAT SERPL: 13 (ref 12–20)
CALCIUM SERPL-MCNC: 8.9 MG/DL (ref 8.5–10.1)
CHLORIDE SERPL-SCNC: 107 MMOL/L (ref 97–108)
CO2 SERPL-SCNC: 28 MMOL/L (ref 21–32)
CREAT SERPL-MCNC: 2.33 MG/DL (ref 0.7–1.3)
GLUCOSE SERPL-MCNC: 86 MG/DL (ref 65–100)
POTASSIUM SERPL-SCNC: 4.6 MMOL/L (ref 3.5–5.1)
SODIUM SERPL-SCNC: 141 MMOL/L (ref 136–145)

## 2022-12-12 RX ORDER — FLUOXETINE 10 MG/1
10 CAPSULE ORAL DAILY
Qty: 30 CAPSULE | Refills: 11 | Status: SHIPPED | OUTPATIENT
Start: 2022-12-12

## 2022-12-12 NOTE — PROGRESS NOTES
[address] [facility]  [place] [address]  [address] [place] [address]  Phone:  [phone]  Fax: [phone]       Chief Complaint   Patient presents with    Follow-up    Hypertension     Patient here for follow high blood pressure. .      SUBJECTIVE:    Mickie Madrid is a 78 y.o. male comes in for return visit accompanied by his wife. The wife thinks he is depressed. He admits that this is indeed the case also primarily because of his lack of improvement with his underlying dementia. He complains of itching in his lower extremities. For this, he is using Eucerin which has helped significantly. He also has chronic venous insufficiency which is an ongoing problem. Left is greater than the right, but is minimal currently. He has a history of coronary artery disease having had a CABG in the past.  He sees Dr. Leonela Santo at least every six months. He has been asymptomatic with no cardiac symptoms suggesting ischemia. Current Outpatient Medications   Medication Sig Dispense Refill    FLUoxetine (PROzac) 10 mg capsule Take 1 Capsule by mouth daily. 30 Capsule 11    pravastatin (PRAVACHOL) 80 mg tablet TAKE 1 TABLET BY MOUTH EVERYDAY AT BEDTIME 90 Tablet 3    salsalate (DISALCID) 500 mg tablet TAKE 1 TAB BY MOUTH TWO (2) TIMES A DAY. 180 Tablet 3    Dutasteride-Tamsulosin 0.5-0.4 mg CM24 TAKE 1 CAPSULE BY MOUTH EVERY DAY 90 Capsule 3    memantine ER (Namenda XR) 28 mg capsule Take 1 Capsule by mouth daily. 30 Capsule 11    Alphagan P 0.1 % ophthalmic solution       aspirin delayed-release 81 mg tablet Take  by mouth daily. furosemide (LASIX) 20 mg tablet Take 20 mg by mouth daily. nitroglycerin (NITROSTAT) 0.4 mg SL tablet 1 Tab by SubLINGual route every five (5) minutes as needed for Chest Pain. Up to 3 doses. 25 Tab 11    VYZULTA 0.024 % drop Apply 1 Drop to eye nightly. Artifi. Tear, Hypromellose,-PF 0.3 % drop Apply 1 Drop to eye two (2) times a day. fish oil-omega-3 fatty acids 340-1,000 mg capsule Take 2 Caps by mouth daily. multivitamin,tx-iron-ca-min (THERA-M W/ IRON) 27-0.4 mg tab Take 1 Tab by mouth daily. GARLIC OIL PO Take  by mouth. (Patient not taking: Reported on 12/12/2022)      carvedilol (COREG) 3.125 mg tablet Take 6.25 mg by mouth two (2) times a day. (Patient not taking: Reported on 12/12/2022)  12    lisinopril (PRINIVIL, ZESTRIL) 10 mg tablet Take  by mouth daily.  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, type 1    CAD (coronary artery disease)     Chronic kidney disease     polycystic kidney disease     Past Surgical History:   Procedure Laterality Date    HX COLONOSCOPY      HX ORTHOPAEDIC      bilateral bunionectomy    HX PACEMAKER      RI CABG, ARTERY-VEIN, THREE  4/18/2007    VASCULAR SURGERY PROCEDURE UNLIST       Allergies   Allergen Reactions    Clopidogrel Other (comments)     bradycardia         REVIEW OF SYSTEMS:  General: negative for - chills or fever  ENT: negative for - headaches, nasal congestion or tinnitus  Respiratory: negative for - cough, hemoptysis, shortness of breath or wheezing  Cardiovascular : negative for - chest pain, edema, palpitations or shortness of breath  Gastrointestinal: negative for - abdominal pain, blood in stools, heartburn or nausea/vomiting  Genito-Urinary: no dysuria, trouble voiding, or hematuria  Musculoskeletal: negative for - gait disturbance, joint pain, joint stiffness or joint swelling  Neurological: no TIA or stroke symptoms  Hematologic: no bruises, no bleeding, no swollen glands  Integument: no lumps, mole changes, nail changes or rash  Endocrine: no malaise/lethargy or unexpected weight changes      Social History     Socioeconomic History    Marital status:      Spouse name: Janeth Hdz    Number of children: 3    Years of education: 25 Occupational History    Occupation: Retired     Employer: [employer]   Tobacco Use    Smoking status: Never    Smokeless tobacco: Never   Vaping Use    Vaping Use: Never used   Substance and Sexual Activity    Alcohol use: No     Comment: quit 12/10/1980    Drug use: No    Sexual activity: Yes     Partners: Female     Family History   Problem Relation Age of Onset    No Known Problems Mother     No Known Problems Father     No Known Problems Sister     Asthma Son     Other Brother         old age       OBJECTIVE:    Visit Vitals  /80   Pulse 75   Temp 97.3 °F (36.3 °C) (Oral)   Resp 16   Ht 6' (1.829 m)   Wt 181 lb 11.2 oz (82.4 kg)   SpO2 100%   BMI 24.64 kg/m²     CONSTITUTIONAL: well , well nourished, appears age appropriate  EYES: perrla, eom intact  ENMT:moist mucous membranes, pharynx clear  NECK: supple. Thyroid normal  RESPIRATORY: Chest: clear to ascultation and percussion   CARDIOVASCULAR: Heart: regular rate and rhythm  GASTROINTESTINAL: Abdomen: soft, bowel sounds active  HEMATOLOGIC: no pathological lymph nodes palpated  MUSCULOSKELETAL: Extremities: no edema, pulse 1+   INTEGUMENT: No unusual rashes or suspicious skin lesions noted. Nails appear normal.  NEUROLOGIC: non-focal exam   MENTAL STATUS: alert and oriented, appropriate affect      ASSESSMENT:  1. Reactive depression    2. Alzheimer's type dementia with late onset without behavioral disturbance ([address]    3. Dyslipidemia    4. Venous insufficiency    5. ASHD (arteriosclerotic heart disease)    6. Renal azotemia    7. Unsteady gait        PLAN:  1. I think the patient is depressed. I think he is frustrated with his current situation. His dementia is actually getting worse and his functional status is decreasing significantly. His walking is quite unsteady, but fortunately he has not fallen. For this reason, I will start him on an antidepressant, specifically fluoxetine 10 mg q.d. and titrate it up if needed.   2. His dementia, as I stated earlier, is gradually getting worse. There is not much that can be done frankly. I reminded the family the importance of physical as well as mental stimulation. Unfortunately, the motivation to participate is not that great. 3. He is in a secondary risk prevention mode in view of his history of coronary artery disease. 4. He has swelling of his lower extremities, which is orthostatic in nature, quite consistent with venous insufficiency. 5. He has a history of coronary artery disease, having had a CABG in the past.  He has remained asymptomatic with no suggestive symptoms of ischemia. 6. He has mild renal insufficiency, which I felt most of this is prerenal.  I will repeat a BMP today to ensure that it is not getting any worse. .  Orders Placed This Encounter    METABOLIC PANEL, BASIC    NT-PRO BNP    FLUoxetine (PROzac) 10 mg capsule         Follow-up and Dispositions    Return in about 3 months (around 3/12/2023).            Lasha Schneider MD

## 2022-12-12 NOTE — PROGRESS NOTES
Madison Berumen is a 78 y.o. male  Chief Complaint   Patient presents with    Follow-up    Hypertension     Patient here for follow high blood pressure. 1. Have you been to the ER, urgent care clinic since your last visit? Hospitalized since your last visit? No    2. Have you seen or consulted any other health care providers outside of the [address] [place] [address] since your last visit? Include any pap smears or colon screening.  No

## 2022-12-14 LAB — APO B SERPL-MCNC: 63 MG/DL

## 2022-12-29 PROBLEM — F32.9 REACTIVE DEPRESSION: Status: ACTIVE | Noted: 2022-12-29

## 2022-12-29 PROBLEM — R26.81 UNSTEADY GAIT: Status: ACTIVE | Noted: 2020-01-15

## 2023-03-09 ENCOUNTER — TRANSCRIBE ORDER (OUTPATIENT)
Dept: SCHEDULING | Age: 83
End: 2023-03-09

## 2023-03-09 DIAGNOSIS — I73.9 PERIPHERAL VASCULAR DISEASE, UNSPECIFIED (HCC): Primary | ICD-10-CM

## 2023-03-12 DIAGNOSIS — R41.3 MEMORY DEFICIT: ICD-10-CM

## 2023-03-12 RX ORDER — MEMANTINE HYDROCHLORIDE 28 MG/1
CAPSULE, EXTENDED RELEASE ORAL
Qty: 30 CAPSULE | Refills: 11 | Status: SHIPPED | OUTPATIENT
Start: 2023-03-12

## 2023-03-13 ENCOUNTER — HOSPITAL ENCOUNTER (OUTPATIENT)
Dept: VASCULAR SURGERY | Age: 83
Discharge: HOME | End: 2023-03-13
Attending: INTERNAL MEDICINE
Payer: MEDICARE

## 2023-03-13 DIAGNOSIS — I73.9 PERIPHERAL VASCULAR DISEASE, UNSPECIFIED (HCC): ICD-10-CM

## 2023-03-13 LAB
LEFT ABI: 1.24
LEFT ARM BP: 123 MMHG
LEFT POSTERIOR TIBIAL: 137 MMHG
LEFT TBI: 0.96
LEFT TOE PRESSURE: 118 MMHG
RIGHT ABI: 1.25
RIGHT ARM BP: 123 MMHG
RIGHT POSTERIOR TIBIAL: 135 MMHG
RIGHT TBI: 1.01
RIGHT TOE PRESSURE: 124 MMHG
VAS LEFT DORSALIS PEDIS BP: 153 MMHG
VAS RIGHT DORSALIS PEDIS BP: 154 MMHG

## 2023-03-13 PROCEDURE — 93922 UPR/L XTREMITY ART 2 LEVELS: CPT

## 2023-04-17 ENCOUNTER — OFFICE VISIT (OUTPATIENT)
Dept: INTERNAL MEDICINE CLINIC | Age: 83
End: 2023-04-17

## 2023-04-17 VITALS
DIASTOLIC BLOOD PRESSURE: 67 MMHG | RESPIRATION RATE: 16 BRPM | SYSTOLIC BLOOD PRESSURE: 112 MMHG | TEMPERATURE: 97.8 F | OXYGEN SATURATION: 97 % | WEIGHT: 178.5 LBS | HEIGHT: 72 IN | HEART RATE: 91 BPM | BODY MASS INDEX: 24.18 KG/M2

## 2023-04-17 DIAGNOSIS — N18.31 STAGE 3A CHRONIC KIDNEY DISEASE (HCC): ICD-10-CM

## 2023-04-17 DIAGNOSIS — F03.90 DEMENTIA WITHOUT BEHAVIORAL DISTURBANCE (HCC): ICD-10-CM

## 2023-04-17 DIAGNOSIS — Z13.31 SCREENING FOR DEPRESSION: ICD-10-CM

## 2023-04-17 DIAGNOSIS — Z00.00 WELLNESS EXAMINATION: ICD-10-CM

## 2023-04-17 DIAGNOSIS — R31.9 HEMATURIA, UNSPECIFIED TYPE: Primary | ICD-10-CM

## 2023-04-17 DIAGNOSIS — I87.2 VENOUS INSUFFICIENCY: ICD-10-CM

## 2023-04-17 DIAGNOSIS — N40.0 BENIGN PROSTATIC HYPERPLASIA WITHOUT LOWER URINARY TRACT SYMPTOMS: ICD-10-CM

## 2023-04-17 DIAGNOSIS — R42 DIZZINESS: ICD-10-CM

## 2023-04-17 DIAGNOSIS — E78.5 DYSLIPIDEMIA: ICD-10-CM

## 2023-04-17 DIAGNOSIS — I25.10 ASHD (ARTERIOSCLEROTIC HEART DISEASE): ICD-10-CM

## 2023-04-17 LAB
ALBUMIN SERPL-MCNC: 4 G/DL (ref 3.5–5)
ALBUMIN/GLOB SERPL: 1.3 (ref 1.1–2.2)
ALP SERPL-CCNC: 35 U/L (ref 45–117)
ALT SERPL-CCNC: 35 U/L (ref 12–78)
ANION GAP SERPL CALC-SCNC: 3 MMOL/L (ref 5–15)
APPEARANCE UR: CLEAR
AST SERPL-CCNC: 31 U/L (ref 15–37)
BASOPHILS # BLD: 0 K/UL (ref 0–0.1)
BASOPHILS NFR BLD: 1 % (ref 0–1)
BILIRUB SERPL-MCNC: 1 MG/DL (ref 0.2–1)
BILIRUB UR QL: NEGATIVE
BUN SERPL-MCNC: 43 MG/DL (ref 6–20)
BUN/CREAT SERPL: 17 (ref 12–20)
CALCIUM SERPL-MCNC: 9.1 MG/DL (ref 8.5–10.1)
CHLORIDE SERPL-SCNC: 110 MMOL/L (ref 97–108)
CHOLEST SERPL-MCNC: 107 MG/DL
CO2 SERPL-SCNC: 28 MMOL/L (ref 21–32)
COLOR UR: NORMAL
CREAT SERPL-MCNC: 2.53 MG/DL (ref 0.7–1.3)
CRP SERPL HS-MCNC: 0.5 MG/L
DIFFERENTIAL METHOD BLD: ABNORMAL
EOSINOPHIL # BLD: 0.1 K/UL (ref 0–0.4)
EOSINOPHIL NFR BLD: 1 % (ref 0–7)
ERYTHROCYTE [DISTWIDTH] IN BLOOD BY AUTOMATED COUNT: 13.4 % (ref 11.5–14.5)
GLOBULIN SER CALC-MCNC: 3 G/DL (ref 2–4)
GLUCOSE SERPL-MCNC: 85 MG/DL (ref 65–100)
GLUCOSE UR STRIP.AUTO-MCNC: NEGATIVE MG/DL
HCT VFR BLD AUTO: 44.6 % (ref 36.6–50.3)
HDLC SERPL-MCNC: 45 MG/DL
HDLC SERPL: 2.4 (ref 0–5)
HGB BLD-MCNC: 13.7 G/DL (ref 12.1–17)
HGB UR QL STRIP: NEGATIVE
IMM GRANULOCYTES # BLD AUTO: 0 K/UL (ref 0–0.04)
IMM GRANULOCYTES NFR BLD AUTO: 0 % (ref 0–0.5)
KETONES UR QL STRIP.AUTO: NEGATIVE MG/DL
LDLC SERPL CALC-MCNC: 48.4 MG/DL (ref 0–100)
LEUKOCYTE ESTERASE UR QL STRIP.AUTO: NEGATIVE
LYMPHOCYTES # BLD: 1.7 K/UL (ref 0.8–3.5)
LYMPHOCYTES NFR BLD: 38 % (ref 12–49)
MCH RBC QN AUTO: 29.4 PG (ref 26–34)
MCHC RBC AUTO-ENTMCNC: 30.7 G/DL (ref 30–36.5)
MCV RBC AUTO: 95.7 FL (ref 80–99)
MONOCYTES # BLD: 0.6 K/UL (ref 0–1)
MONOCYTES NFR BLD: 13 % (ref 5–13)
NEUTS SEG # BLD: 2.1 K/UL (ref 1.8–8)
NEUTS SEG NFR BLD: 47 % (ref 32–75)
NITRITE UR QL STRIP.AUTO: NEGATIVE
NRBC # BLD: 0 K/UL (ref 0–0.01)
NRBC BLD-RTO: 0 PER 100 WBC
PH UR STRIP: 5.5 (ref 5–8)
PLATELET # BLD AUTO: 140 K/UL (ref 150–400)
PMV BLD AUTO: 11.3 FL (ref 8.9–12.9)
POTASSIUM SERPL-SCNC: 4.3 MMOL/L (ref 3.5–5.1)
PROT SERPL-MCNC: 7 G/DL (ref 6.4–8.2)
PROT UR STRIP-MCNC: NEGATIVE MG/DL
PSA SERPL-MCNC: 1.2 NG/ML (ref 0.01–4)
RBC # BLD AUTO: 4.66 M/UL (ref 4.1–5.7)
SODIUM SERPL-SCNC: 141 MMOL/L (ref 136–145)
SP GR UR REFRACTOMETRY: 1.01 (ref 1–1.03)
TRIGL SERPL-MCNC: 68 MG/DL (ref ?–150)
UROBILINOGEN UR QL STRIP.AUTO: 0.2 EU/DL (ref 0.2–1)
VLDLC SERPL CALC-MCNC: 13.6 MG/DL
WBC # BLD AUTO: 4.5 K/UL (ref 4.1–11.1)

## 2023-04-17 NOTE — PROGRESS NOTES
Geraldine Castrejon is a 78 y.o. male  Chief Complaint   Patient presents with    Annual Wellness Visit     Patient here for Annual Wellness Exam.      YES Answers must have Comments  1. \"Have you been to the ER, urgent care clinic since your last visit? Hospitalized since your last visit? \"    [] YES   [x] NO       2. Have you seen or consulted any other health care providers outside of [address] [place] [address] since your last visit?     [] YES   [x] NO       3. For patients aged 39-70: Have you had a colorectal cancer screening such as a colonoscopy/FIT/Cologuard? Nurse/CMA to request records if not in chart   [] YES   [] NO   [x] NA, based on age    If the patient is female:      4. For female patients aged 41-77: Espinoza Holden you had a mammogram in the last two years?  Nurse/CMA to request records if not in chart   [] YES   [] NO   [x] NA, based on gender    5. For female patients aged 21-65: Espinoza Holden you had a pap smear?   Nurse/CMA to request records if not in chart   [] YES   [] NO  [x] NA, based on gender

## 2023-04-19 LAB — APO B SERPL-MCNC: 62 MG/DL

## 2023-04-23 PROBLEM — I87.2 VENOUS INSUFFICIENCY: Status: ACTIVE | Noted: 2023-04-23

## 2023-04-26 ENCOUNTER — PATIENT MESSAGE (OUTPATIENT)
Dept: INTERNAL MEDICINE CLINIC | Age: 83
End: 2023-04-26

## 2023-05-03 DIAGNOSIS — N18.32 CHRONIC RENAL FAILURE, STAGE 3B (HCC): Primary | ICD-10-CM

## 2023-05-04 ENCOUNTER — TRANSCRIBE ORDERS (OUTPATIENT)
Facility: HOSPITAL | Age: 83
End: 2023-05-04

## 2023-05-04 ENCOUNTER — TELEPHONE (OUTPATIENT)
Dept: INTERNAL MEDICINE CLINIC | Age: 83
End: 2023-05-04

## 2023-05-04 DIAGNOSIS — N18.32 CHRONIC RENAL FAILURE, STAGE 3B (HCC): Primary | ICD-10-CM

## 2023-05-08 ENCOUNTER — HOSPITAL ENCOUNTER (OUTPATIENT)
Facility: HOSPITAL | Age: 83
Discharge: HOME | End: 2023-05-11
Payer: MEDICARE

## 2023-05-08 DIAGNOSIS — N18.32 CHRONIC RENAL FAILURE, STAGE 3B (HCC): ICD-10-CM

## 2023-05-08 PROCEDURE — 76770 US EXAM ABDO BACK WALL COMP: CPT

## 2023-06-01 RX ORDER — DUTASTERIDE AND TAMSULOSIN HYDROCHLORIDE CAPSULES .5; .4 MG/1; MG/1
CAPSULE ORAL
Qty: 90 CAPSULE | Refills: 3 | Status: SHIPPED | OUTPATIENT
Start: 2023-06-01

## 2023-06-01 RX ORDER — PRAVASTATIN SODIUM 80 MG/1
TABLET ORAL
Qty: 90 TABLET | Refills: 3 | Status: SHIPPED | OUTPATIENT
Start: 2023-06-01

## 2023-07-10 RX ORDER — SALSALATE 500 MG
TABLET ORAL
Qty: 180 TABLET | Refills: 3 | Status: SHIPPED | OUTPATIENT
Start: 2023-07-10

## 2023-08-17 ENCOUNTER — OFFICE VISIT (OUTPATIENT)
Facility: CLINIC | Age: 83
End: 2023-08-17

## 2023-08-17 VITALS
RESPIRATION RATE: 18 BRPM | BODY MASS INDEX: 24.66 KG/M2 | OXYGEN SATURATION: 98 % | WEIGHT: 182.1 LBS | TEMPERATURE: 97.9 F | SYSTOLIC BLOOD PRESSURE: 122 MMHG | HEIGHT: 72 IN | DIASTOLIC BLOOD PRESSURE: 80 MMHG | HEART RATE: 78 BPM

## 2023-08-17 DIAGNOSIS — M45.3 ANKYLOSING SPONDYLITIS OF CERVICOTHORACIC REGION (HCC): ICD-10-CM

## 2023-08-17 DIAGNOSIS — M17.11 PRIMARY OSTEOARTHRITIS OF RIGHT KNEE: ICD-10-CM

## 2023-08-17 DIAGNOSIS — E78.5 DYSLIPIDEMIA: ICD-10-CM

## 2023-08-17 DIAGNOSIS — F02.80 ALZHEIMER'S TYPE DEMENTIA WITH LATE ONSET WITHOUT BEHAVIORAL DISTURBANCE (HCC): Primary | ICD-10-CM

## 2023-08-17 DIAGNOSIS — I25.10 ASHD (ARTERIOSCLEROTIC HEART DISEASE): ICD-10-CM

## 2023-08-17 DIAGNOSIS — N18.31 STAGE 3A CHRONIC KIDNEY DISEASE (HCC): ICD-10-CM

## 2023-08-17 DIAGNOSIS — G30.1 ALZHEIMER'S TYPE DEMENTIA WITH LATE ONSET WITHOUT BEHAVIORAL DISTURBANCE (HCC): Primary | ICD-10-CM

## 2023-08-17 LAB
ANION GAP SERPL CALC-SCNC: 6 MMOL/L (ref 5–15)
APPEARANCE UR: CLEAR
BACTERIA URNS QL MICRO: NEGATIVE /HPF
BILIRUB UR QL: NEGATIVE
BUN SERPL-MCNC: 39 MG/DL (ref 6–20)
BUN/CREAT SERPL: 18 (ref 12–20)
CALCIUM SERPL-MCNC: 9.6 MG/DL (ref 8.5–10.1)
CHLORIDE SERPL-SCNC: 109 MMOL/L (ref 97–108)
CO2 SERPL-SCNC: 28 MMOL/L (ref 21–32)
COLOR UR: NORMAL
CREAT SERPL-MCNC: 2.11 MG/DL (ref 0.7–1.3)
EPITH CASTS URNS QL MICRO: NORMAL /LPF
GLUCOSE SERPL-MCNC: 89 MG/DL (ref 65–100)
GLUCOSE UR STRIP.AUTO-MCNC: NEGATIVE MG/DL
HGB UR QL STRIP: NEGATIVE
HYALINE CASTS URNS QL MICRO: NORMAL /LPF (ref 0–5)
KETONES UR QL STRIP.AUTO: NEGATIVE MG/DL
LEUKOCYTE ESTERASE UR QL STRIP.AUTO: NEGATIVE
NITRITE UR QL STRIP.AUTO: NEGATIVE
PH UR STRIP: 6 (ref 5–8)
POTASSIUM SERPL-SCNC: 4.5 MMOL/L (ref 3.5–5.1)
PROT UR STRIP-MCNC: NEGATIVE MG/DL
RBC #/AREA URNS HPF: NORMAL /HPF (ref 0–5)
SODIUM SERPL-SCNC: 143 MMOL/L (ref 136–145)
SP GR UR REFRACTOMETRY: 1.01 (ref 1–1.03)
UROBILINOGEN UR QL STRIP.AUTO: 0.2 EU/DL (ref 0.2–1)
WBC URNS QL MICRO: NORMAL /HPF (ref 0–4)

## 2023-08-17 ASSESSMENT — PATIENT HEALTH QUESTIONNAIRE - PHQ9
SUM OF ALL RESPONSES TO PHQ QUESTIONS 1-9: 0
2. FEELING DOWN, DEPRESSED OR HOPELESS: 0
SUM OF ALL RESPONSES TO PHQ QUESTIONS 1-9: 0

## 2023-08-18 NOTE — PROGRESS NOTES
comes in for return visit stating that he has done reasonably well. He is accompanied by his wife. He denies any shortness of breath or chest pain. He does have history of extensive coronary artery disease and he has been stable. He continues to have a reasonable sense of humor and communicates much better today than on previous visits. He uses somewhat of a Rollator for gait assistance and it is still reasonably well. He h as not fallen since I last saw him. He does have a history of ankylosing spondylitis which is stable and no more progressive now than it has been in the last decade or so. He has a past history of mild chronic renal failure minimally progressive, hypertension and dyslipidemia. His dementia is reasonably stable.

## 2023-08-19 PROBLEM — M17.11 PRIMARY OSTEOARTHRITIS OF RIGHT KNEE: Status: ACTIVE | Noted: 2023-08-19

## 2023-08-19 LAB — APO B SERPL-MCNC: 62 MG/DL

## 2023-08-25 RX ORDER — MEMANTINE HYDROCHLORIDE 28 MG/1
CAPSULE, EXTENDED RELEASE ORAL
Qty: 90 CAPSULE | Refills: 3 | Status: SHIPPED | OUTPATIENT
Start: 2023-08-25

## 2023-08-25 RX ORDER — FLUOXETINE 10 MG/1
10 CAPSULE ORAL DAILY
Qty: 90 CAPSULE | Refills: 3 | Status: SHIPPED | OUTPATIENT
Start: 2023-08-25

## 2023-10-25 ENCOUNTER — APPOINTMENT (OUTPATIENT)
Facility: HOSPITAL | Age: 83
End: 2023-10-25
Payer: MEDICARE

## 2023-10-25 ENCOUNTER — HOSPITAL ENCOUNTER (EMERGENCY)
Facility: HOSPITAL | Age: 83
Discharge: HOME | End: 2023-10-25
Attending: EMERGENCY MEDICINE
Payer: MEDICARE

## 2023-10-25 VITALS
HEART RATE: 70 BPM | OXYGEN SATURATION: 98 % | RESPIRATION RATE: 13 BRPM | TEMPERATURE: 98.3 F | DIASTOLIC BLOOD PRESSURE: 77 MMHG | SYSTOLIC BLOOD PRESSURE: 119 MMHG

## 2023-10-25 DIAGNOSIS — E86.0 DEHYDRATION: ICD-10-CM

## 2023-10-25 DIAGNOSIS — R10.9 LEFT FLANK PAIN: Primary | ICD-10-CM

## 2023-10-25 LAB
ALBUMIN SERPL-MCNC: 2.9 G/DL (ref 3.5–5)
ALBUMIN/GLOB SERPL: 1 (ref 1.1–2.2)
ALP SERPL-CCNC: 32 U/L (ref 45–117)
ALT SERPL-CCNC: 21 U/L (ref 12–78)
ANION GAP SERPL CALC-SCNC: 6 MMOL/L (ref 5–15)
APPEARANCE UR: CLEAR
AST SERPL-CCNC: 26 U/L (ref 15–37)
BACTERIA URNS QL MICRO: NEGATIVE /HPF
BASOPHILS # BLD: 0 K/UL (ref 0–0.1)
BASOPHILS NFR BLD: 1 % (ref 0–1)
BILIRUB SERPL-MCNC: 1.4 MG/DL (ref 0.2–1)
BILIRUB UR QL: NEGATIVE
BUN SERPL-MCNC: 26 MG/DL (ref 6–20)
BUN/CREAT SERPL: 14 (ref 12–20)
CALCIUM SERPL-MCNC: 7 MG/DL (ref 8.5–10.1)
CHLORIDE SERPL-SCNC: 115 MMOL/L (ref 97–108)
CO2 SERPL-SCNC: 25 MMOL/L (ref 21–32)
COLOR UR: ABNORMAL
CREAT SERPL-MCNC: 1.87 MG/DL (ref 0.7–1.3)
DIFFERENTIAL METHOD BLD: ABNORMAL
EOSINOPHIL # BLD: 0.1 K/UL (ref 0–0.4)
EOSINOPHIL NFR BLD: 1 % (ref 0–7)
EPITH CASTS URNS QL MICRO: ABNORMAL /LPF
ERYTHROCYTE [DISTWIDTH] IN BLOOD BY AUTOMATED COUNT: 12.8 % (ref 11.5–14.5)
GLOBULIN SER CALC-MCNC: 2.9 G/DL (ref 2–4)
GLUCOSE SERPL-MCNC: 92 MG/DL (ref 65–100)
GLUCOSE UR STRIP.AUTO-MCNC: NEGATIVE MG/DL
HCT VFR BLD AUTO: 41.7 % (ref 36.6–50.3)
HGB BLD-MCNC: 13.2 G/DL (ref 12.1–17)
HGB UR QL STRIP: NEGATIVE
HYALINE CASTS URNS QL MICRO: ABNORMAL /LPF (ref 0–2)
IMM GRANULOCYTES # BLD AUTO: 0 K/UL (ref 0–0.04)
IMM GRANULOCYTES NFR BLD AUTO: 0 % (ref 0–0.5)
KETONES UR QL STRIP.AUTO: NEGATIVE MG/DL
LEUKOCYTE ESTERASE UR QL STRIP.AUTO: NEGATIVE
LIPASE SERPL-CCNC: 28 U/L (ref 13–75)
LYMPHOCYTES # BLD: 1.4 K/UL (ref 0.8–3.5)
LYMPHOCYTES NFR BLD: 24 % (ref 12–49)
MCH RBC QN AUTO: 29.3 PG (ref 26–34)
MCHC RBC AUTO-ENTMCNC: 31.7 G/DL (ref 30–36.5)
MCV RBC AUTO: 92.5 FL (ref 80–99)
MONOCYTES # BLD: 1.1 K/UL (ref 0–1)
MONOCYTES NFR BLD: 19 % (ref 5–13)
NEUTS SEG # BLD: 3 K/UL (ref 1.8–8)
NEUTS SEG NFR BLD: 55 % (ref 32–75)
NITRITE UR QL STRIP.AUTO: NEGATIVE
NRBC # BLD: 0 K/UL (ref 0–0.01)
NRBC BLD-RTO: 0 PER 100 WBC
PH UR STRIP: 7 (ref 5–8)
PLATELET # BLD AUTO: 124 K/UL (ref 150–400)
PMV BLD AUTO: 10.4 FL (ref 8.9–12.9)
POTASSIUM SERPL-SCNC: 3.4 MMOL/L (ref 3.5–5.1)
PROT SERPL-MCNC: 5.8 G/DL (ref 6.4–8.2)
PROT UR STRIP-MCNC: NEGATIVE MG/DL
RBC # BLD AUTO: 4.51 M/UL (ref 4.1–5.7)
RBC #/AREA URNS HPF: ABNORMAL /HPF (ref 0–5)
SODIUM SERPL-SCNC: 146 MMOL/L (ref 136–145)
SP GR UR REFRACTOMETRY: 1.03
URINE CULTURE IF INDICATED: ABNORMAL
UROBILINOGEN UR QL STRIP.AUTO: 2 EU/DL (ref 0.2–1)
WBC # BLD AUTO: 5.5 K/UL (ref 4.1–11.1)
WBC URNS QL MICRO: ABNORMAL /HPF (ref 0–4)

## 2023-10-25 PROCEDURE — 81001 URINALYSIS AUTO W/SCOPE: CPT

## 2023-10-25 PROCEDURE — 80053 COMPREHEN METABOLIC PANEL: CPT

## 2023-10-25 PROCEDURE — 83690 ASSAY OF LIPASE: CPT

## 2023-10-25 PROCEDURE — 6360000004 HC RX CONTRAST MEDICATION: Performed by: EMERGENCY MEDICINE

## 2023-10-25 PROCEDURE — 93005 ELECTROCARDIOGRAM TRACING: CPT | Performed by: EMERGENCY MEDICINE

## 2023-10-25 PROCEDURE — 96360 HYDRATION IV INFUSION INIT: CPT

## 2023-10-25 PROCEDURE — 99285 EMERGENCY DEPT VISIT HI MDM: CPT

## 2023-10-25 PROCEDURE — 36415 COLL VENOUS BLD VENIPUNCTURE: CPT

## 2023-10-25 PROCEDURE — 85025 COMPLETE CBC W/AUTO DIFF WBC: CPT

## 2023-10-25 PROCEDURE — 2580000003 HC RX 258: Performed by: EMERGENCY MEDICINE

## 2023-10-25 PROCEDURE — 96361 HYDRATE IV INFUSION ADD-ON: CPT

## 2023-10-25 PROCEDURE — 74177 CT ABD & PELVIS W/CONTRAST: CPT

## 2023-10-25 PROCEDURE — 6370000000 HC RX 637 (ALT 250 FOR IP): Performed by: STUDENT IN AN ORGANIZED HEALTH CARE EDUCATION/TRAINING PROGRAM

## 2023-10-25 PROCEDURE — 76705 ECHO EXAM OF ABDOMEN: CPT

## 2023-10-25 PROCEDURE — 71045 X-RAY EXAM CHEST 1 VIEW: CPT

## 2023-10-25 RX ORDER — DICYCLOMINE HCL 20 MG
20 TABLET ORAL ONCE
Status: COMPLETED | OUTPATIENT
Start: 2023-10-25 | End: 2023-10-25

## 2023-10-25 RX ORDER — SODIUM CHLORIDE, SODIUM LACTATE, POTASSIUM CHLORIDE, AND CALCIUM CHLORIDE .6; .31; .03; .02 G/100ML; G/100ML; G/100ML; G/100ML
1000 INJECTION, SOLUTION INTRAVENOUS
Status: COMPLETED | OUTPATIENT
Start: 2023-10-25 | End: 2023-10-25

## 2023-10-25 RX ORDER — LIDOCAINE 4 G/G
1 PATCH TOPICAL
Status: DISCONTINUED | OUTPATIENT
Start: 2023-10-25 | End: 2023-10-26 | Stop reason: HOSPADM

## 2023-10-25 RX ADMIN — DICYCLOMINE HYDROCHLORIDE 20 MG: 20 TABLET ORAL at 18:31

## 2023-10-25 RX ADMIN — SODIUM CHLORIDE, POTASSIUM CHLORIDE, SODIUM LACTATE AND CALCIUM CHLORIDE 1000 ML: 600; 310; 30; 20 INJECTION, SOLUTION INTRAVENOUS at 15:48

## 2023-10-25 RX ADMIN — IOPAMIDOL 100 ML: 755 INJECTION, SOLUTION INTRAVENOUS at 16:17

## 2023-10-25 ASSESSMENT — PAIN SCALES - GENERAL: PAINLEVEL_OUTOF10: 7

## 2023-10-25 ASSESSMENT — PAIN DESCRIPTION - LOCATION: LOCATION: FLANK

## 2023-10-25 NOTE — ED NOTES
Patient report given to Miguel Ángel Dykes RN including SBAR report      Pamela Busby RN  10/25/23 1914

## 2023-10-25 NOTE — ED PROVIDER NOTES
[facility] EMERGENCY DEPT  EMERGENCY DEPARTMENT ENCOUNTER       Pt Name: Megan Joseph  MRN: [id number]  [address] [facility] 1940  Date of evaluation: 10/25/2023  Provider: Jesús Kelley MD   PCP: Rigoberto Henry MD  Note Started: 3:31 PM EDT 10/25/23     CHIEF COMPLAINT       Chief Complaint   Patient presents with    Flank Pain     Left side x3 days, tender to touch. Hx of dementia         HISTORY OF PRESENT ILLNESS: 1 or more elements      History From: patient, wife History limited by: dementia     Megan Joseph is a 78 y.o. male with a history of dementia, pacemaker in place, CAD and CKD presents emerged department with a chief plaint of left flank pain. Patient's wife reports over the past 2 weeks has been increasingly weak. History of dementia. Decreased p.o. intake. Patient began complaining of pain in left side. No falls or trauma. On my assessment patient points to his lower abdomen and reports pain here. No dysuria or hematuria. \"Felt warm\" but no measured fevers or chills. No nausea, vomiting or diarrhea. Please See MDM for Additional Details of the HPI/PMH  Nursing Notes were all reviewed and agreed with or any disagreements were addressed in the HPI. REVIEW OF SYSTEMS        Positives and Pertinent negatives as per HPI.     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

## 2023-10-25 NOTE — ED NOTES
Patient stable GCS15  Hooked to cardiac monitor  Iv cannula inserted, blood works done sent to laboratory, diagnostics done. Lying comfortably in bed.  Medications given   Ambulated patient no complaints   Awaiting further orders      Casie Barrera RN  10/25/23 [id number]

## 2023-10-25 NOTE — ED NOTES
RN to bedside  Vital signs obtained, plan of care updated. No  needs at this time. Call bell within reach and family remains at bedside.        Hill Rogers RN  10/25/23 6741

## 2023-10-25 NOTE — DISCHARGE INSTRUCTIONS
Please follow-up with your primary care doctor. Please return for new or worsening symptoms anytime. Please use lidocaine patches and Tylenol. You can obtain these from the [facility].

## 2023-10-26 LAB
EKG ATRIAL RATE: 70 BPM
EKG DIAGNOSIS: NORMAL
EKG P-R INTERVAL: 132 MS
EKG Q-T INTERVAL: 430 MS
EKG QRS DURATION: 146 MS
EKG QTC CALCULATION (BAZETT): 464 MS
EKG R AXIS: -85 DEGREES
EKG T AXIS: 113 DEGREES
EKG VENTRICULAR RATE: 70 BPM

## 2023-11-02 ENCOUNTER — OFFICE VISIT (OUTPATIENT)
Facility: CLINIC | Age: 83
End: 2023-11-02

## 2023-11-02 VITALS
WEIGHT: 180.9 LBS | RESPIRATION RATE: 20 BRPM | HEIGHT: 72 IN | TEMPERATURE: 97.4 F | SYSTOLIC BLOOD PRESSURE: 148 MMHG | BODY MASS INDEX: 24.5 KG/M2 | DIASTOLIC BLOOD PRESSURE: 87 MMHG | OXYGEN SATURATION: 96 % | HEART RATE: 69 BPM

## 2023-11-02 DIAGNOSIS — F02.80 ALZHEIMER'S TYPE DEMENTIA WITH LATE ONSET WITHOUT BEHAVIORAL DISTURBANCE (HCC): ICD-10-CM

## 2023-11-02 DIAGNOSIS — I25.10 ASHD (ARTERIOSCLEROTIC HEART DISEASE): ICD-10-CM

## 2023-11-02 DIAGNOSIS — K80.21 CALCULUS OF GALLBLADDER WITH BILIARY OBSTRUCTION BUT WITHOUT CHOLECYSTITIS: ICD-10-CM

## 2023-11-02 DIAGNOSIS — G30.1 ALZHEIMER'S TYPE DEMENTIA WITH LATE ONSET WITHOUT BEHAVIORAL DISTURBANCE (HCC): ICD-10-CM

## 2023-11-02 DIAGNOSIS — M45.3 ANKYLOSING SPONDYLITIS OF CERVICOTHORACIC REGION (HCC): Primary | ICD-10-CM

## 2023-11-02 ASSESSMENT — PATIENT HEALTH QUESTIONNAIRE - PHQ9
SUM OF ALL RESPONSES TO PHQ QUESTIONS 1-9: 0
1. LITTLE INTEREST OR PLEASURE IN DOING THINGS: 0
SUM OF ALL RESPONSES TO PHQ QUESTIONS 1-9: 0
2. FEELING DOWN, DEPRESSED OR HOPELESS: 0
SUM OF ALL RESPONSES TO PHQ QUESTIONS 1-9: 0
SUM OF ALL RESPONSES TO PHQ QUESTIONS 1-9: 0
SUM OF ALL RESPONSES TO PHQ9 QUESTIONS 1 & 2: 0

## 2023-11-02 NOTE — PROGRESS NOTES
[facility]  [address] [facility] [address]  Phone:  [phone]  Fax: [phone]       Chief Complaint   Patient presents with    Follow-up     ED [facility] ED forPain around waist and low back   . SUBJECTIVE:    Tej Locektt is a 78 y.o. male  Dictation on: 11/02/2023  3:37 PM by: Praneeth Mitchell [[id number]]          Current Outpatient Medications   Medication Sig Dispense Refill    memantine ER (NAMENDA XR) 28 MG CP24 extended release capsule TAKE 1 CAPSULE BY MOUTH EVERY DAY 90 capsule 3    FLUoxetine (PROZAC) 10 MG capsule Take 1 capsule by mouth daily 90 capsule 3    salsalate (DISALCID) 500 MG tablet TAKE 1 TABLET BY MOUTH TWICE A  tablet 3    dutasteride-tamsulosin 0.5-0.4 MG CAPS TAKE 1 CAPSULE BY MOUTH EVERY DAY 90 capsule 3    pravastatin (PRAVACHOL) 80 MG tablet TAKE 1 TABLET BY MOUTH EVERYDAY AT BEDTIME 90 tablet 3    aspirin 81 MG EC tablet Take by mouth daily      furosemide (LASIX) 20 MG tablet Take 1 tablet by mouth daily      lisinopril (PRINIVIL;ZESTRIL) 10 MG tablet Take by mouth daily      nitroGLYCERIN (NITROSTAT) 0.4 MG SL tablet Place 1 tablet under the tongue      GARLIC OIL PO Take by mouth (Patient not taking: Reported on 11/2/2023)      brimonidine (ALPHAGAN P) 0.1 % SOLN ceived the following from [facility]: Outside name: Alphagan P 0.1 % ophthalmic solution (Patient not taking: Reported on 8/17/2023)      carvedilol (COREG) 3.125 MG tablet Take 6.25 mg by mouth 2 times daily (Patient not taking: Reported on 8/17/2023)      Hypromellose 0.3 % SOLN Apply 1 drop to eye 2 times daily (Patient not taking: Reported on 11/2/2023)      Latanoprostene Bunod (VYZULTA) 0.024 % SOLN Apply 1 drop to eye (Patient not taking: Reported on 8/17/2023)       No current facility-administered medications for this visit.      Past Medical History:   Diagnosis Date    Arthritis     generalized    Atrioventricular node arrhythmia     2nd

## 2023-11-03 NOTE — PROGRESS NOTES
comes in accompanied by his daughter. He went to the emergency room most recently and the complaint of generalized pain that he was having. Apparently before this he had not been eating and drinking well. He has progressed to the point where he is unable to walk which ultimately prompted the visit to the emergency room. He was evaluated and CT scan demonstrated cyst in his kidneys and cholelithiasis. No other abnormalities were noted. He has continued to complain of pain in his left hip distal thigh region laterally. Apparently after adequate hydration, he felt much better and his mobility improved and he is now walking. He has a past history of primary hypertension, dyslipidemia, and cardiovascular disease with mild renal insufficiency, which has not been progressive.

## 2023-11-04 PROBLEM — K80.21 CALCULUS OF GALLBLADDER WITH BILIARY OBSTRUCTION BUT WITHOUT CHOLECYSTITIS: Status: ACTIVE | Noted: 2023-11-04

## 2023-11-17 ENCOUNTER — OFFICE VISIT (OUTPATIENT)
Facility: CLINIC | Age: 83
End: 2023-11-17

## 2023-11-17 VITALS
HEART RATE: 69 BPM | DIASTOLIC BLOOD PRESSURE: 73 MMHG | RESPIRATION RATE: 18 BRPM | OXYGEN SATURATION: 96 % | TEMPERATURE: 97.9 F | BODY MASS INDEX: 23.89 KG/M2 | HEIGHT: 72 IN | WEIGHT: 176.4 LBS | SYSTOLIC BLOOD PRESSURE: 118 MMHG

## 2023-11-17 DIAGNOSIS — I25.10 ASHD (ARTERIOSCLEROTIC HEART DISEASE): ICD-10-CM

## 2023-11-17 DIAGNOSIS — R48.2 GAIT APRAXIA OF ELDERLY: ICD-10-CM

## 2023-11-17 DIAGNOSIS — F02.80 ALZHEIMER'S TYPE DEMENTIA WITH LATE ONSET WITHOUT BEHAVIORAL DISTURBANCE (HCC): ICD-10-CM

## 2023-11-17 DIAGNOSIS — R53.81 PHYSICAL DECONDITIONING: ICD-10-CM

## 2023-11-17 DIAGNOSIS — I25.5 CARDIOMYOPATHY, ISCHEMIC: ICD-10-CM

## 2023-11-17 DIAGNOSIS — G30.1 ALZHEIMER'S TYPE DEMENTIA WITH LATE ONSET WITHOUT BEHAVIORAL DISTURBANCE (HCC): ICD-10-CM

## 2023-11-17 DIAGNOSIS — M45.3 ANKYLOSING SPONDYLITIS OF CERVICOTHORACIC REGION (HCC): ICD-10-CM

## 2023-11-17 DIAGNOSIS — N18.31 STAGE 3A CHRONIC KIDNEY DISEASE (HCC): Primary | ICD-10-CM

## 2023-11-17 ASSESSMENT — PATIENT HEALTH QUESTIONNAIRE - PHQ9
SUM OF ALL RESPONSES TO PHQ9 QUESTIONS 1 & 2: 0
2. FEELING DOWN, DEPRESSED OR HOPELESS: 0
1. LITTLE INTEREST OR PLEASURE IN DOING THINGS: 0
SUM OF ALL RESPONSES TO PHQ QUESTIONS 1-9: 0

## 2023-11-18 LAB
ANION GAP SERPL CALC-SCNC: 8 MMOL/L (ref 5–15)
BUN SERPL-MCNC: 35 MG/DL (ref 6–20)
BUN/CREAT SERPL: 16 (ref 12–20)
CALCIUM SERPL-MCNC: 9.2 MG/DL (ref 8.5–10.1)
CHLORIDE SERPL-SCNC: 108 MMOL/L (ref 97–108)
CO2 SERPL-SCNC: 26 MMOL/L (ref 21–32)
CREAT SERPL-MCNC: 2.17 MG/DL (ref 0.7–1.3)
GLUCOSE SERPL-MCNC: 85 MG/DL (ref 65–100)
NT PRO BNP: 2120 PG/ML
POTASSIUM SERPL-SCNC: 4.4 MMOL/L (ref 3.5–5.1)
SODIUM SERPL-SCNC: 142 MMOL/L (ref 136–145)

## 2023-11-22 ENCOUNTER — TELEPHONE (OUTPATIENT)
Facility: CLINIC | Age: 83
End: 2023-11-22

## 2023-11-22 NOTE — TELEPHONE ENCOUNTER
Spoke with patient wife and ask that she give diuretic every other day call if shortness of breath and he will have to take the diurectic daily

## 2023-11-22 NOTE — TELEPHONE ENCOUNTER
----- Message from Shilpa Hernandez MD sent at 11/19/2023  6:45 PM EST -----  Tell patient's wife that patient has to take diuretic every other day and every third day. If there is any shortness of breath then he will have to take the diuretic daily.

## 2023-12-28 ENCOUNTER — APPOINTMENT (OUTPATIENT)
Facility: HOSPITAL | Age: 83
End: 2023-12-28
Payer: MEDICARE

## 2023-12-28 ENCOUNTER — HOSPITAL ENCOUNTER (EMERGENCY)
Facility: HOSPITAL | Age: 83
Discharge: HOME | End: 2023-12-28
Attending: EMERGENCY MEDICINE
Payer: MEDICARE

## 2023-12-28 VITALS
SYSTOLIC BLOOD PRESSURE: 145 MMHG | DIASTOLIC BLOOD PRESSURE: 86 MMHG | TEMPERATURE: 97.1 F | HEART RATE: 70 BPM | RESPIRATION RATE: 13 BRPM | OXYGEN SATURATION: 97 %

## 2023-12-28 DIAGNOSIS — R40.4 TRANSIENT ALTERATION OF AWARENESS: Primary | ICD-10-CM

## 2023-12-28 DIAGNOSIS — F01.B0 MODERATE VASCULAR DEMENTIA WITHOUT BEHAVIORAL DISTURBANCE, PSYCHOTIC DISTURBANCE, MOOD DISTURBANCE, OR ANXIETY (HCC): ICD-10-CM

## 2023-12-28 LAB
ALBUMIN SERPL-MCNC: 3.8 G/DL (ref 3.5–5)
ALBUMIN/GLOB SERPL: 1 (ref 1.1–2.2)
ALP SERPL-CCNC: 43 U/L (ref 45–117)
ALT SERPL-CCNC: 28 U/L (ref 12–78)
ANION GAP SERPL CALC-SCNC: 2 MMOL/L (ref 5–15)
APPEARANCE UR: CLEAR
AST SERPL-CCNC: 38 U/L (ref 15–37)
BACTERIA URNS QL MICRO: NEGATIVE /HPF
BASOPHILS # BLD: 0.1 K/UL (ref 0–0.1)
BASOPHILS NFR BLD: 1 % (ref 0–1)
BILIRUB SERPL-MCNC: 0.8 MG/DL (ref 0.2–1)
BILIRUB UR QL: NEGATIVE
BUN SERPL-MCNC: 26 MG/DL (ref 6–20)
BUN/CREAT SERPL: 12 (ref 12–20)
CALCIUM SERPL-MCNC: 9.8 MG/DL (ref 8.5–10.1)
CHLORIDE SERPL-SCNC: 106 MMOL/L (ref 97–108)
CO2 SERPL-SCNC: 28 MMOL/L (ref 21–32)
COLOR UR: ABNORMAL
CREAT SERPL-MCNC: 2.12 MG/DL (ref 0.7–1.3)
DIFFERENTIAL METHOD BLD: NORMAL
EOSINOPHIL # BLD: 0.3 K/UL (ref 0–0.4)
EOSINOPHIL NFR BLD: 5 % (ref 0–7)
EPITH CASTS URNS QL MICRO: ABNORMAL /LPF
ERYTHROCYTE [DISTWIDTH] IN BLOOD BY AUTOMATED COUNT: 13.7 % (ref 11.5–14.5)
FLUAV AG NPH QL IA: NEGATIVE
FLUBV AG NOSE QL IA: NEGATIVE
GLOBULIN SER CALC-MCNC: 3.7 G/DL (ref 2–4)
GLUCOSE SERPL-MCNC: 91 MG/DL (ref 65–100)
GLUCOSE UR STRIP.AUTO-MCNC: NEGATIVE MG/DL
GRAN CASTS URNS QL MICRO: ABNORMAL /LPF
HCT VFR BLD AUTO: 45.9 % (ref 36.6–50.3)
HGB BLD-MCNC: 14.3 G/DL (ref 12.1–17)
HGB UR QL STRIP: NEGATIVE
HYALINE CASTS URNS QL MICRO: ABNORMAL /LPF (ref 0–5)
IMM GRANULOCYTES # BLD AUTO: 0 K/UL (ref 0–0.04)
IMM GRANULOCYTES NFR BLD AUTO: 0 % (ref 0–0.5)
KETONES UR QL STRIP.AUTO: NEGATIVE MG/DL
LEUKOCYTE ESTERASE UR QL STRIP.AUTO: NEGATIVE
LYMPHOCYTES # BLD: 1.3 K/UL (ref 0.8–3.5)
LYMPHOCYTES NFR BLD: 25 % (ref 12–49)
MAGNESIUM SERPL-MCNC: 2.3 MG/DL (ref 1.6–2.4)
MCH RBC QN AUTO: 29.2 PG (ref 26–34)
MCHC RBC AUTO-ENTMCNC: 31.2 G/DL (ref 30–36.5)
MCV RBC AUTO: 93.9 FL (ref 80–99)
MONOCYTES # BLD: 0.7 K/UL (ref 0–1)
MONOCYTES NFR BLD: 13 % (ref 5–13)
NEUTS SEG # BLD: 3.1 K/UL (ref 1.8–8)
NEUTS SEG NFR BLD: 56 % (ref 32–75)
NITRITE UR QL STRIP.AUTO: NEGATIVE
NRBC # BLD: 0 K/UL (ref 0–0.01)
NRBC BLD-RTO: 0 PER 100 WBC
OTHER: ABNORMAL
PH UR STRIP: 5 (ref 5–8)
PLATELET # BLD AUTO: 150 K/UL (ref 150–400)
PMV BLD AUTO: 10.3 FL (ref 8.9–12.9)
POTASSIUM SERPL-SCNC: 5 MMOL/L (ref 3.5–5.1)
PROT SERPL-MCNC: 7.5 G/DL (ref 6.4–8.2)
PROT UR STRIP-MCNC: NEGATIVE MG/DL
RBC # BLD AUTO: 4.89 M/UL (ref 4.1–5.7)
RBC #/AREA URNS HPF: ABNORMAL /HPF (ref 0–5)
SARS-COV-2 RDRP RESP QL NAA+PROBE: NOT DETECTED
SODIUM SERPL-SCNC: 136 MMOL/L (ref 136–145)
SOURCE: NORMAL
SP GR UR REFRACTOMETRY: 1.01
TROPONIN I SERPL HS-MCNC: 18 NG/L (ref 0–76)
URINE CULTURE IF INDICATED: ABNORMAL
UROBILINOGEN UR QL STRIP.AUTO: 0.2 EU/DL (ref 0.2–1)
WBC # BLD AUTO: 5.5 K/UL (ref 4.1–11.1)
WBC URNS QL MICRO: ABNORMAL /HPF (ref 0–4)
YEAST URNS QL MICRO: PRESENT

## 2023-12-28 PROCEDURE — 80053 COMPREHEN METABOLIC PANEL: CPT

## 2023-12-28 PROCEDURE — 93005 ELECTROCARDIOGRAM TRACING: CPT | Performed by: STUDENT IN AN ORGANIZED HEALTH CARE EDUCATION/TRAINING PROGRAM

## 2023-12-28 PROCEDURE — 81001 URINALYSIS AUTO W/SCOPE: CPT

## 2023-12-28 PROCEDURE — 2580000003 HC RX 258: Performed by: EMERGENCY MEDICINE

## 2023-12-28 PROCEDURE — 87804 INFLUENZA ASSAY W/OPTIC: CPT

## 2023-12-28 PROCEDURE — 36415 COLL VENOUS BLD VENIPUNCTURE: CPT

## 2023-12-28 PROCEDURE — 83735 ASSAY OF MAGNESIUM: CPT

## 2023-12-28 PROCEDURE — 85025 COMPLETE CBC W/AUTO DIFF WBC: CPT

## 2023-12-28 PROCEDURE — 99284 EMERGENCY DEPT VISIT MOD MDM: CPT

## 2023-12-28 PROCEDURE — 84484 ASSAY OF TROPONIN QUANT: CPT

## 2023-12-28 PROCEDURE — 70450 CT HEAD/BRAIN W/O DYE: CPT

## 2023-12-28 PROCEDURE — 87635 SARS-COV-2 COVID-19 AMP PRB: CPT

## 2023-12-28 RX ORDER — 0.9 % SODIUM CHLORIDE 0.9 %
500 INTRAVENOUS SOLUTION INTRAVENOUS
Status: COMPLETED | OUTPATIENT
Start: 2023-12-28 | End: 2023-12-28

## 2023-12-28 RX ADMIN — SODIUM CHLORIDE 500 ML: 9 INJECTION, SOLUTION INTRAVENOUS at 18:35

## 2023-12-28 NOTE — ED PROVIDER NOTES
[facility] EMERGENCY DEPT  EMERGENCY DEPARTMENT ENCOUNTER       Pt Name: Mary Yoon  MRN: [id number]  [address] [facility] [place] 1940  Date of evaluation: 12/28/2023  Provider: Bora Frey MD   PCP: Pedro Luis Silvestre MD  Note Started: 4:04 PM 12/28/23     CHIEF COMPLAINT       Chief Complaint   Patient presents with    Altered Mental Status     Patient BIBEMS for altered mental status after his daughter found patient slumped over and slow to respond in the bathroom today. Patient does not recall what happened but upon arrival to ED, patient reports that everything feels normal.         HISTORY OF PRESENT ILLNESS: 1 or more elements      History From: Patient  None     Mary Yoon is a 78 y.o. male with past medical history of dementia, CAD status post CABG, CKD, diabetes who presents after an episode of unresponsiveness earlier today. His lives with his wife and daughter and EMS states that he had been in the bathroom for a long time, which is not unusual for him. His wife states that his daughter went to go check on him and she found him slumped over. His eyes were open but he was not responding. He was trying to speak words but none of it was making sense. His wife states that he has a history of Alzheimer's and he has frequent episodes of confusion-including not knowing that he is in his own home, not knowing who his family members are, and saying words that do not make sense. Today, on EMS arrival, the patient also was not responding however his eyes were open. Once he started to have, to the monitor, he became more alert and talkative. EMS denies that he had any incontinence. Blood sugar with EMS was 116 and he was afebrile. Family is unsure for exactly how long he was slumped over in the bathroom for. On arrival, patient notes chronic low back pain and chronic joint pains. He denies breathing difficulties, chest pain, abdominal pain, vomiting or diarrhea.      Nursing Notes were all reviewed and

## 2023-12-28 NOTE — PROGRESS NOTES
Patient offloaded by EMS. CC: at home when daughter found in bathroom in rollator slumped over, eyes open but not responsive. Upon EMS arrival patient was able to [facility] for several minutes but then able to talk and essentially back to baseline. EMS 12 lead paced as normal, no other deficits seen, , afebrile. Patient complaining of normal pain in back/joints. patient placed in gown, Srx2, call bell in reach,cardiac monitor/pulse ox applied.

## 2023-12-29 NOTE — DISCHARGE INSTRUCTIONS
It was a pleasure taking care of you at [facility] Emergency Department today. We know that when you come to [facility], you are entrusting us with your health, comfort, and safety. Our physicians and nurses honor that trust, and we truly appreciate the opportunity to care for you and your loved ones. We also value your feedback. If you receive a survey about your Emergency Department experience today, please fill it out. We care about our patients' feedback, and we listen to what you have to say. Thank you!

## 2023-12-29 NOTE — ED NOTES
Patient discharged from the ED by this RN. Diagnosis, medications, precautions and follow-ups were reviewed with the patient/family. Questions were asked and answered prior to departure. Patient departed the ED via wheelchair and was accompanied by wife.

## 2023-12-30 LAB
EKG ATRIAL RATE: 70 BPM
EKG DIAGNOSIS: NORMAL
EKG P-R INTERVAL: 166 MS
EKG Q-T INTERVAL: 442 MS
EKG QRS DURATION: 144 MS
EKG QTC CALCULATION (BAZETT): 477 MS
EKG R AXIS: -75 DEGREES
EKG T AXIS: 101 DEGREES
EKG VENTRICULAR RATE: 70 BPM

## 2024-01-01 ENCOUNTER — HOSPITAL ENCOUNTER (INPATIENT)
Facility: HOSPITAL | Age: 84
LOS: 11 days | DRG: 951 | End: 2024-05-12
Attending: FAMILY MEDICINE | Admitting: FAMILY MEDICINE
Payer: COMMERCIAL

## 2024-01-01 ENCOUNTER — HOSPICE ADMISSION (OUTPATIENT)
Age: 84
End: 2024-01-01
Payer: MEDICARE

## 2024-01-01 ENCOUNTER — HOME CARE VISIT (OUTPATIENT)
Age: 84
End: 2024-01-01
Payer: MEDICARE

## 2024-01-01 VITALS
DIASTOLIC BLOOD PRESSURE: 50 MMHG | RESPIRATION RATE: 12 BRPM | TEMPERATURE: 98.4 F | OXYGEN SATURATION: 94 % | HEART RATE: 70 BPM | SYSTOLIC BLOOD PRESSURE: 87 MMHG

## 2024-01-01 PROCEDURE — 6360000002 HC RX W HCPCS: Performed by: INTERNAL MEDICINE

## 2024-01-01 PROCEDURE — 2580000003 HC RX 258: Performed by: FAMILY MEDICINE

## 2024-01-01 PROCEDURE — 6370000000 HC RX 637 (ALT 250 FOR IP): Performed by: FAMILY MEDICINE

## 2024-01-01 PROCEDURE — 6360000002 HC RX W HCPCS: Performed by: FAMILY MEDICINE

## 2024-01-01 PROCEDURE — 1100000000 HC RM PRIVATE

## 2024-01-01 PROCEDURE — 0656 HSPC GENERAL INPATIENT

## 2024-01-01 PROCEDURE — 6360000002 HC RX W HCPCS

## 2024-01-01 PROCEDURE — 2500000003 HC RX 250 WO HCPCS: Performed by: FAMILY MEDICINE

## 2024-01-01 PROCEDURE — 99233 SBSQ HOSP IP/OBS HIGH 50: CPT | Performed by: INTERNAL MEDICINE

## 2024-01-01 PROCEDURE — 51702 INSERT TEMP BLADDER CATH: CPT

## 2024-01-01 RX ORDER — KETOROLAC TROMETHAMINE 30 MG/ML
30 INJECTION, SOLUTION INTRAMUSCULAR; INTRAVENOUS EVERY 6 HOURS PRN
Status: ACTIVE | OUTPATIENT
Start: 2024-01-01 | End: 2024-01-01

## 2024-01-01 RX ORDER — LIDOCAINE HYDROCHLORIDE 20 MG/ML
JELLY TOPICAL ONCE
Status: COMPLETED | OUTPATIENT
Start: 2024-01-01 | End: 2024-01-01

## 2024-01-01 RX ORDER — HYDROMORPHONE HYDROCHLORIDE 2 MG/ML
2 INJECTION, SOLUTION INTRAMUSCULAR; INTRAVENOUS; SUBCUTANEOUS
Status: DISCONTINUED | OUTPATIENT
Start: 2024-01-01 | End: 2024-01-01

## 2024-01-01 RX ORDER — LORAZEPAM 2 MG/ML
2 INJECTION INTRAMUSCULAR
Status: DISCONTINUED | OUTPATIENT
Start: 2024-01-01 | End: 2024-01-01 | Stop reason: HOSPADM

## 2024-01-01 RX ORDER — LORAZEPAM 2 MG/ML
1 INJECTION INTRAMUSCULAR
Status: DISCONTINUED | OUTPATIENT
Start: 2024-01-01 | End: 2024-01-01

## 2024-01-01 RX ORDER — SODIUM CHLORIDE 0.9 % (FLUSH) 0.9 %
5-40 SYRINGE (ML) INJECTION PRN
Status: DISCONTINUED | OUTPATIENT
Start: 2024-01-01 | End: 2024-01-01 | Stop reason: HOSPADM

## 2024-01-01 RX ORDER — HYDROMORPHONE HYDROCHLORIDE 2 MG/ML
3 INJECTION, SOLUTION INTRAMUSCULAR; INTRAVENOUS; SUBCUTANEOUS
Status: DISCONTINUED | OUTPATIENT
Start: 2024-01-01 | End: 2024-01-01 | Stop reason: HOSPADM

## 2024-01-01 RX ORDER — HYDROMORPHONE HYDROCHLORIDE 2 MG/ML
2 INJECTION, SOLUTION INTRAMUSCULAR; INTRAVENOUS; SUBCUTANEOUS EVERY 4 HOURS
Status: DISCONTINUED | OUTPATIENT
Start: 2024-01-01 | End: 2024-01-01

## 2024-01-01 RX ORDER — HYDROMORPHONE HYDROCHLORIDE 1 MG/ML
1 INJECTION, SOLUTION INTRAMUSCULAR; INTRAVENOUS; SUBCUTANEOUS EVERY 4 HOURS
Status: DISCONTINUED | OUTPATIENT
Start: 2024-01-01 | End: 2024-01-01

## 2024-01-01 RX ORDER — BISACODYL 10 MG
10 SUPPOSITORY, RECTAL RECTAL DAILY PRN
Status: DISCONTINUED | OUTPATIENT
Start: 2024-01-01 | End: 2024-01-01 | Stop reason: HOSPADM

## 2024-01-01 RX ORDER — HYDROMORPHONE HYDROCHLORIDE 1 MG/ML
1 INJECTION, SOLUTION INTRAMUSCULAR; INTRAVENOUS; SUBCUTANEOUS
Status: DISCONTINUED | OUTPATIENT
Start: 2024-01-01 | End: 2024-01-01

## 2024-01-01 RX ORDER — GLYCOPYRROLATE 0.2 MG/ML
0.2 INJECTION INTRAMUSCULAR; INTRAVENOUS EVERY 4 HOURS PRN
Status: DISCONTINUED | OUTPATIENT
Start: 2024-01-01 | End: 2024-01-01 | Stop reason: HOSPADM

## 2024-01-01 RX ORDER — LORAZEPAM 2 MG/ML
1 INJECTION INTRAMUSCULAR EVERY 4 HOURS
Status: DISCONTINUED | OUTPATIENT
Start: 2024-01-01 | End: 2024-01-01

## 2024-01-01 RX ADMIN — LORAZEPAM 1 MG: 2 INJECTION INTRAMUSCULAR; INTRAVENOUS at 23:38

## 2024-01-01 RX ADMIN — HYDROMORPHONE HYDROCHLORIDE 1 MG: 1 INJECTION, SOLUTION INTRAMUSCULAR; INTRAVENOUS; SUBCUTANEOUS at 23:24

## 2024-01-01 RX ADMIN — GLYCOPYRROLATE 0.2 MG: 0.2 INJECTION INTRAMUSCULAR; INTRAVENOUS at 21:22

## 2024-01-01 RX ADMIN — HYDROMORPHONE HYDROCHLORIDE 1 MG: 1 INJECTION, SOLUTION INTRAMUSCULAR; INTRAVENOUS; SUBCUTANEOUS at 20:19

## 2024-01-01 RX ADMIN — LORAZEPAM 1 MG: 2 INJECTION INTRAMUSCULAR; INTRAVENOUS at 08:39

## 2024-01-01 RX ADMIN — LORAZEPAM 1 MG: 2 INJECTION INTRAMUSCULAR; INTRAVENOUS at 12:38

## 2024-01-01 RX ADMIN — LIDOCAINE HYDROCHLORIDE: 20 JELLY TOPICAL at 15:58

## 2024-01-01 RX ADMIN — HYDROMORPHONE HYDROCHLORIDE 2 MG: 2 INJECTION, SOLUTION INTRAMUSCULAR; INTRAVENOUS; SUBCUTANEOUS at 00:09

## 2024-01-01 RX ADMIN — LORAZEPAM 1 MG: 2 INJECTION INTRAMUSCULAR; INTRAVENOUS at 16:11

## 2024-01-01 RX ADMIN — LORAZEPAM 1 MG: 2 INJECTION INTRAMUSCULAR; INTRAVENOUS at 09:21

## 2024-01-01 RX ADMIN — HYDROMORPHONE HYDROCHLORIDE 2 MG: 2 INJECTION, SOLUTION INTRAMUSCULAR; INTRAVENOUS; SUBCUTANEOUS at 20:33

## 2024-01-01 RX ADMIN — HYDROMORPHONE HYDROCHLORIDE 1 MG: 1 INJECTION, SOLUTION INTRAMUSCULAR; INTRAVENOUS; SUBCUTANEOUS at 03:51

## 2024-01-01 RX ADMIN — HYDROMORPHONE HYDROCHLORIDE 2 MG: 2 INJECTION, SOLUTION INTRAMUSCULAR; INTRAVENOUS; SUBCUTANEOUS at 04:17

## 2024-01-01 RX ADMIN — SODIUM CHLORIDE, PRESERVATIVE FREE 10 ML: 5 INJECTION INTRAVENOUS at 09:21

## 2024-01-01 RX ADMIN — HYDROMORPHONE HYDROCHLORIDE 2 MG: 2 INJECTION, SOLUTION INTRAMUSCULAR; INTRAVENOUS; SUBCUTANEOUS at 00:23

## 2024-01-01 RX ADMIN — HYDROMORPHONE HYDROCHLORIDE 1 MG: 1 INJECTION, SOLUTION INTRAMUSCULAR; INTRAVENOUS; SUBCUTANEOUS at 04:14

## 2024-01-01 RX ADMIN — HYDROMORPHONE HYDROCHLORIDE 1 MG: 1 INJECTION, SOLUTION INTRAMUSCULAR; INTRAVENOUS; SUBCUTANEOUS at 12:06

## 2024-01-01 RX ADMIN — LORAZEPAM 1 MG: 2 INJECTION INTRAMUSCULAR; INTRAVENOUS at 08:34

## 2024-01-01 RX ADMIN — HYDROMORPHONE HYDROCHLORIDE 3 MG: 2 INJECTION, SOLUTION INTRAMUSCULAR; INTRAVENOUS; SUBCUTANEOUS at 21:07

## 2024-01-01 RX ADMIN — LORAZEPAM 2 MG: 2 INJECTION INTRAMUSCULAR; INTRAVENOUS at 14:54

## 2024-01-01 RX ADMIN — LORAZEPAM 1 MG: 2 INJECTION INTRAMUSCULAR; INTRAVENOUS at 20:06

## 2024-01-01 RX ADMIN — HYDROMORPHONE HYDROCHLORIDE 2 MG: 2 INJECTION, SOLUTION INTRAMUSCULAR; INTRAVENOUS; SUBCUTANEOUS at 04:03

## 2024-01-01 RX ADMIN — LORAZEPAM 2 MG: 2 INJECTION INTRAMUSCULAR; INTRAVENOUS at 03:09

## 2024-01-01 RX ADMIN — HYDROMORPHONE HYDROCHLORIDE 1 MG: 1 INJECTION, SOLUTION INTRAMUSCULAR; INTRAVENOUS; SUBCUTANEOUS at 09:21

## 2024-01-01 RX ADMIN — HYDROMORPHONE HYDROCHLORIDE 1 MG: 1 INJECTION, SOLUTION INTRAMUSCULAR; INTRAVENOUS; SUBCUTANEOUS at 08:54

## 2024-01-01 RX ADMIN — SODIUM CHLORIDE, PRESERVATIVE FREE 10 ML: 5 INJECTION INTRAVENOUS at 20:34

## 2024-01-01 RX ADMIN — HYDROMORPHONE HYDROCHLORIDE 2 MG: 2 INJECTION, SOLUTION INTRAMUSCULAR; INTRAVENOUS; SUBCUTANEOUS at 04:47

## 2024-01-01 RX ADMIN — LORAZEPAM 1 MG: 2 INJECTION INTRAMUSCULAR; INTRAVENOUS at 20:21

## 2024-01-01 RX ADMIN — HYDROMORPHONE HYDROCHLORIDE 2 MG: 2 INJECTION, SOLUTION INTRAMUSCULAR; INTRAVENOUS; SUBCUTANEOUS at 16:45

## 2024-01-01 RX ADMIN — LORAZEPAM 1 MG: 2 INJECTION INTRAMUSCULAR; INTRAVENOUS at 04:30

## 2024-01-01 RX ADMIN — HYDROMORPHONE HYDROCHLORIDE 2 MG: 2 INJECTION, SOLUTION INTRAMUSCULAR; INTRAVENOUS; SUBCUTANEOUS at 15:45

## 2024-01-01 RX ADMIN — HYDROMORPHONE HYDROCHLORIDE 1 MG: 1 INJECTION, SOLUTION INTRAMUSCULAR; INTRAVENOUS; SUBCUTANEOUS at 20:17

## 2024-01-01 RX ADMIN — HYDROMORPHONE HYDROCHLORIDE 1 MG: 1 INJECTION, SOLUTION INTRAMUSCULAR; INTRAVENOUS; SUBCUTANEOUS at 04:39

## 2024-01-01 RX ADMIN — LORAZEPAM 1 MG: 2 INJECTION INTRAMUSCULAR; INTRAVENOUS at 21:20

## 2024-01-01 RX ADMIN — LORAZEPAM 2 MG: 2 INJECTION INTRAMUSCULAR; INTRAVENOUS at 17:57

## 2024-01-01 RX ADMIN — HYDROMORPHONE HYDROCHLORIDE 1 MG: 1 INJECTION, SOLUTION INTRAMUSCULAR; INTRAVENOUS; SUBCUTANEOUS at 20:07

## 2024-01-01 RX ADMIN — HYDROMORPHONE HYDROCHLORIDE 3 MG: 2 INJECTION, SOLUTION INTRAMUSCULAR; INTRAVENOUS; SUBCUTANEOUS at 12:01

## 2024-01-01 RX ADMIN — LORAZEPAM 1 MG: 2 INJECTION INTRAMUSCULAR; INTRAVENOUS at 04:02

## 2024-01-01 RX ADMIN — LORAZEPAM 2 MG: 2 INJECTION INTRAMUSCULAR; INTRAVENOUS at 17:59

## 2024-01-01 RX ADMIN — LORAZEPAM 1 MG: 2 INJECTION INTRAMUSCULAR; INTRAVENOUS at 00:08

## 2024-01-01 RX ADMIN — LORAZEPAM 1 MG: 2 INJECTION INTRAMUSCULAR; INTRAVENOUS at 09:14

## 2024-01-01 RX ADMIN — LORAZEPAM 1 MG: 2 INJECTION INTRAMUSCULAR; INTRAVENOUS at 12:05

## 2024-01-01 RX ADMIN — SODIUM CHLORIDE, PRESERVATIVE FREE 5 ML: 5 INJECTION INTRAVENOUS at 21:10

## 2024-01-01 RX ADMIN — LORAZEPAM 1 MG: 2 INJECTION INTRAMUSCULAR; INTRAVENOUS at 16:45

## 2024-01-01 RX ADMIN — LORAZEPAM 1 MG: 2 INJECTION INTRAMUSCULAR; INTRAVENOUS at 08:18

## 2024-01-01 RX ADMIN — HYDROMORPHONE HYDROCHLORIDE 3 MG: 2 INJECTION, SOLUTION INTRAMUSCULAR; INTRAVENOUS; SUBCUTANEOUS at 00:04

## 2024-01-01 RX ADMIN — SODIUM CHLORIDE, PRESERVATIVE FREE 10 ML: 5 INJECTION INTRAVENOUS at 09:22

## 2024-01-01 RX ADMIN — HYDROMORPHONE HYDROCHLORIDE 2 MG: 2 INJECTION, SOLUTION INTRAMUSCULAR; INTRAVENOUS; SUBCUTANEOUS at 06:12

## 2024-01-01 RX ADMIN — LORAZEPAM 1 MG: 2 INJECTION INTRAMUSCULAR; INTRAVENOUS at 17:01

## 2024-01-01 RX ADMIN — HYDROMORPHONE HYDROCHLORIDE 3 MG: 2 INJECTION, SOLUTION INTRAMUSCULAR; INTRAVENOUS; SUBCUTANEOUS at 08:24

## 2024-01-01 RX ADMIN — HYDROMORPHONE HYDROCHLORIDE 2 MG: 2 INJECTION, SOLUTION INTRAMUSCULAR; INTRAVENOUS; SUBCUTANEOUS at 20:43

## 2024-01-01 RX ADMIN — HYDROMORPHONE HYDROCHLORIDE 1 MG: 1 INJECTION, SOLUTION INTRAMUSCULAR; INTRAVENOUS; SUBCUTANEOUS at 15:55

## 2024-01-01 RX ADMIN — HYDROMORPHONE HYDROCHLORIDE 2 MG: 2 INJECTION, SOLUTION INTRAMUSCULAR; INTRAVENOUS; SUBCUTANEOUS at 21:10

## 2024-01-01 RX ADMIN — HYDROMORPHONE HYDROCHLORIDE 1 MG: 1 INJECTION, SOLUTION INTRAMUSCULAR; INTRAVENOUS; SUBCUTANEOUS at 12:05

## 2024-01-01 RX ADMIN — HYDROMORPHONE HYDROCHLORIDE 3 MG: 2 INJECTION, SOLUTION INTRAMUSCULAR; INTRAVENOUS; SUBCUTANEOUS at 17:59

## 2024-01-01 RX ADMIN — LORAZEPAM 1 MG: 2 INJECTION INTRAMUSCULAR; INTRAVENOUS at 00:20

## 2024-01-01 RX ADMIN — HYDROMORPHONE HYDROCHLORIDE 3 MG: 2 INJECTION, SOLUTION INTRAMUSCULAR; INTRAVENOUS; SUBCUTANEOUS at 21:10

## 2024-01-01 RX ADMIN — LORAZEPAM 1 MG: 2 INJECTION INTRAMUSCULAR; INTRAVENOUS at 15:02

## 2024-01-01 RX ADMIN — LORAZEPAM 1 MG: 2 INJECTION INTRAMUSCULAR; INTRAVENOUS at 08:12

## 2024-01-01 RX ADMIN — HYDROMORPHONE HYDROCHLORIDE 2 MG: 2 INJECTION, SOLUTION INTRAMUSCULAR; INTRAVENOUS; SUBCUTANEOUS at 00:10

## 2024-01-01 RX ADMIN — HYDROMORPHONE HYDROCHLORIDE 2 MG: 2 INJECTION, SOLUTION INTRAMUSCULAR; INTRAVENOUS; SUBCUTANEOUS at 12:01

## 2024-01-01 RX ADMIN — HYDROMORPHONE HYDROCHLORIDE 2 MG: 2 INJECTION, SOLUTION INTRAMUSCULAR; INTRAVENOUS; SUBCUTANEOUS at 08:33

## 2024-01-01 RX ADMIN — LORAZEPAM 1 MG: 2 INJECTION INTRAMUSCULAR; INTRAVENOUS at 12:11

## 2024-01-01 RX ADMIN — HYDROMORPHONE HYDROCHLORIDE 3 MG: 2 INJECTION, SOLUTION INTRAMUSCULAR; INTRAVENOUS; SUBCUTANEOUS at 14:53

## 2024-01-01 RX ADMIN — HYDROMORPHONE HYDROCHLORIDE 1 MG: 1 INJECTION, SOLUTION INTRAMUSCULAR; INTRAVENOUS; SUBCUTANEOUS at 03:45

## 2024-01-01 RX ADMIN — HYDROMORPHONE HYDROCHLORIDE 1 MG: 1 INJECTION, SOLUTION INTRAMUSCULAR; INTRAVENOUS; SUBCUTANEOUS at 17:02

## 2024-01-01 RX ADMIN — SODIUM CHLORIDE, PRESERVATIVE FREE 10 ML: 5 INJECTION INTRAVENOUS at 08:55

## 2024-01-01 RX ADMIN — LORAZEPAM 1 MG: 2 INJECTION INTRAMUSCULAR; INTRAVENOUS at 12:34

## 2024-01-01 RX ADMIN — HYDROMORPHONE HYDROCHLORIDE 1 MG: 1 INJECTION, SOLUTION INTRAMUSCULAR; INTRAVENOUS; SUBCUTANEOUS at 00:15

## 2024-01-01 RX ADMIN — LORAZEPAM 2 MG: 2 INJECTION INTRAMUSCULAR; INTRAVENOUS at 21:10

## 2024-01-01 RX ADMIN — LORAZEPAM 2 MG: 2 INJECTION INTRAMUSCULAR; INTRAVENOUS at 06:12

## 2024-01-01 RX ADMIN — LORAZEPAM 2 MG: 2 INJECTION INTRAMUSCULAR; INTRAVENOUS at 12:02

## 2024-01-01 RX ADMIN — LORAZEPAM 1 MG: 2 INJECTION INTRAMUSCULAR; INTRAVENOUS at 00:28

## 2024-01-01 RX ADMIN — HYDROMORPHONE HYDROCHLORIDE 3 MG: 2 INJECTION, SOLUTION INTRAMUSCULAR; INTRAVENOUS; SUBCUTANEOUS at 03:09

## 2024-01-01 RX ADMIN — LORAZEPAM 1 MG: 2 INJECTION INTRAMUSCULAR; INTRAVENOUS at 18:04

## 2024-01-01 RX ADMIN — HYDROMORPHONE HYDROCHLORIDE 3 MG: 2 INJECTION, SOLUTION INTRAMUSCULAR; INTRAVENOUS; SUBCUTANEOUS at 17:57

## 2024-01-01 RX ADMIN — HYDROMORPHONE HYDROCHLORIDE 2 MG: 2 INJECTION, SOLUTION INTRAMUSCULAR; INTRAVENOUS; SUBCUTANEOUS at 12:33

## 2024-01-01 RX ADMIN — LORAZEPAM 1 MG: 2 INJECTION INTRAMUSCULAR; INTRAVENOUS at 04:15

## 2024-01-01 RX ADMIN — SODIUM CHLORIDE, PRESERVATIVE FREE 10 ML: 5 INJECTION INTRAVENOUS at 21:10

## 2024-01-01 RX ADMIN — LORAZEPAM 1 MG: 2 INJECTION INTRAMUSCULAR; INTRAVENOUS at 20:33

## 2024-01-01 RX ADMIN — HYDROMORPHONE HYDROCHLORIDE 2 MG: 2 INJECTION, SOLUTION INTRAMUSCULAR; INTRAVENOUS; SUBCUTANEOUS at 20:47

## 2024-01-01 RX ADMIN — HYDROMORPHONE HYDROCHLORIDE 2 MG: 2 INJECTION, SOLUTION INTRAMUSCULAR; INTRAVENOUS; SUBCUTANEOUS at 08:18

## 2024-01-01 RX ADMIN — HYDROMORPHONE HYDROCHLORIDE 1 MG: 1 INJECTION, SOLUTION INTRAMUSCULAR; INTRAVENOUS; SUBCUTANEOUS at 08:34

## 2024-01-01 RX ADMIN — LORAZEPAM 2 MG: 2 INJECTION INTRAMUSCULAR; INTRAVENOUS at 23:02

## 2024-01-01 RX ADMIN — LORAZEPAM 1 MG: 2 INJECTION INTRAMUSCULAR; INTRAVENOUS at 16:35

## 2024-01-01 RX ADMIN — HYDROMORPHONE HYDROCHLORIDE 2 MG: 2 INJECTION, SOLUTION INTRAMUSCULAR; INTRAVENOUS; SUBCUTANEOUS at 00:20

## 2024-01-01 RX ADMIN — LORAZEPAM 1 MG: 2 INJECTION INTRAMUSCULAR; INTRAVENOUS at 08:54

## 2024-01-01 RX ADMIN — LORAZEPAM 1 MG: 2 INJECTION INTRAMUSCULAR; INTRAVENOUS at 21:00

## 2024-01-01 RX ADMIN — LORAZEPAM 1 MG: 2 INJECTION INTRAMUSCULAR; INTRAVENOUS at 00:10

## 2024-01-01 RX ADMIN — LORAZEPAM 1 MG: 2 INJECTION INTRAMUSCULAR; INTRAVENOUS at 04:39

## 2024-01-01 RX ADMIN — HYDROMORPHONE HYDROCHLORIDE 2 MG: 2 INJECTION, SOLUTION INTRAMUSCULAR; INTRAVENOUS; SUBCUTANEOUS at 02:57

## 2024-01-01 RX ADMIN — LORAZEPAM 1 MG: 2 INJECTION INTRAMUSCULAR; INTRAVENOUS at 15:55

## 2024-01-01 RX ADMIN — LORAZEPAM 1 MG: 2 INJECTION INTRAMUSCULAR; INTRAVENOUS at 06:12

## 2024-01-01 RX ADMIN — HYDROMORPHONE HYDROCHLORIDE 3 MG: 2 INJECTION, SOLUTION INTRAMUSCULAR; INTRAVENOUS; SUBCUTANEOUS at 03:13

## 2024-01-01 RX ADMIN — HYDROMORPHONE HYDROCHLORIDE 2 MG: 2 INJECTION, SOLUTION INTRAMUSCULAR; INTRAVENOUS; SUBCUTANEOUS at 08:12

## 2024-01-01 RX ADMIN — LORAZEPAM 1 MG: 2 INJECTION INTRAMUSCULAR; INTRAVENOUS at 23:25

## 2024-01-01 RX ADMIN — HYDROMORPHONE HYDROCHLORIDE 1 MG: 1 INJECTION, SOLUTION INTRAMUSCULAR; INTRAVENOUS; SUBCUTANEOUS at 16:50

## 2024-01-01 RX ADMIN — LORAZEPAM 2 MG: 2 INJECTION INTRAMUSCULAR; INTRAVENOUS at 03:13

## 2024-01-01 RX ADMIN — LORAZEPAM 1 MG: 2 INJECTION INTRAMUSCULAR; INTRAVENOUS at 00:16

## 2024-01-01 RX ADMIN — HYDROMORPHONE HYDROCHLORIDE 3 MG: 2 INJECTION, SOLUTION INTRAMUSCULAR; INTRAVENOUS; SUBCUTANEOUS at 06:11

## 2024-01-01 RX ADMIN — HYDROMORPHONE HYDROCHLORIDE 2 MG: 2 INJECTION, SOLUTION INTRAMUSCULAR; INTRAVENOUS; SUBCUTANEOUS at 16:34

## 2024-01-01 RX ADMIN — LORAZEPAM 1 MG: 2 INJECTION INTRAMUSCULAR; INTRAVENOUS at 05:48

## 2024-01-01 RX ADMIN — HYDROMORPHONE HYDROCHLORIDE 2 MG: 2 INJECTION, SOLUTION INTRAMUSCULAR; INTRAVENOUS; SUBCUTANEOUS at 21:00

## 2024-01-01 RX ADMIN — LORAZEPAM 1 MG: 2 INJECTION INTRAMUSCULAR; INTRAVENOUS at 11:36

## 2024-01-01 RX ADMIN — LORAZEPAM 1 MG: 2 INJECTION INTRAMUSCULAR; INTRAVENOUS at 00:09

## 2024-01-01 RX ADMIN — LORAZEPAM 1 MG: 2 INJECTION INTRAMUSCULAR; INTRAVENOUS at 20:48

## 2024-01-01 RX ADMIN — HYDROMORPHONE HYDROCHLORIDE 2 MG: 2 INJECTION, SOLUTION INTRAMUSCULAR; INTRAVENOUS; SUBCUTANEOUS at 08:39

## 2024-01-01 RX ADMIN — LORAZEPAM 2 MG: 2 INJECTION INTRAMUSCULAR; INTRAVENOUS at 21:06

## 2024-01-01 RX ADMIN — LORAZEPAM 1 MG: 2 INJECTION INTRAMUSCULAR; INTRAVENOUS at 20:16

## 2024-01-01 RX ADMIN — HYDROMORPHONE HYDROCHLORIDE 2 MG: 2 INJECTION, SOLUTION INTRAMUSCULAR; INTRAVENOUS; SUBCUTANEOUS at 12:41

## 2024-01-01 RX ADMIN — HYDROMORPHONE HYDROCHLORIDE 2 MG: 2 INJECTION, SOLUTION INTRAMUSCULAR; INTRAVENOUS; SUBCUTANEOUS at 15:02

## 2024-01-01 RX ADMIN — HYDROMORPHONE HYDROCHLORIDE 2 MG: 2 INJECTION, SOLUTION INTRAMUSCULAR; INTRAVENOUS; SUBCUTANEOUS at 18:04

## 2024-01-01 RX ADMIN — LORAZEPAM 2 MG: 2 INJECTION INTRAMUSCULAR; INTRAVENOUS at 08:27

## 2024-01-01 RX ADMIN — HYDROMORPHONE HYDROCHLORIDE 1 MG: 1 INJECTION, SOLUTION INTRAMUSCULAR; INTRAVENOUS; SUBCUTANEOUS at 00:26

## 2024-01-01 RX ADMIN — HYDROMORPHONE HYDROCHLORIDE 2 MG: 2 INJECTION, SOLUTION INTRAMUSCULAR; INTRAVENOUS; SUBCUTANEOUS at 11:42

## 2024-01-01 RX ADMIN — LORAZEPAM 1 MG: 2 INJECTION INTRAMUSCULAR; INTRAVENOUS at 21:09

## 2024-01-01 RX ADMIN — LORAZEPAM 2 MG: 2 INJECTION INTRAMUSCULAR; INTRAVENOUS at 00:12

## 2024-01-01 RX ADMIN — LORAZEPAM 1 MG: 2 INJECTION INTRAMUSCULAR; INTRAVENOUS at 03:51

## 2024-01-01 RX ADMIN — HYDROMORPHONE HYDROCHLORIDE 1 MG: 1 INJECTION, SOLUTION INTRAMUSCULAR; INTRAVENOUS; SUBCUTANEOUS at 21:22

## 2024-01-01 RX ADMIN — HYDROMORPHONE HYDROCHLORIDE 1 MG: 1 INJECTION, SOLUTION INTRAMUSCULAR; INTRAVENOUS; SUBCUTANEOUS at 05:47

## 2024-01-01 RX ADMIN — HYDROMORPHONE HYDROCHLORIDE 3 MG: 2 INJECTION, SOLUTION INTRAMUSCULAR; INTRAVENOUS; SUBCUTANEOUS at 15:09

## 2024-01-01 RX ADMIN — HYDROMORPHONE HYDROCHLORIDE 2 MG: 2 INJECTION, SOLUTION INTRAMUSCULAR; INTRAVENOUS; SUBCUTANEOUS at 16:11

## 2024-01-01 RX ADMIN — LORAZEPAM 1 MG: 2 INJECTION INTRAMUSCULAR; INTRAVENOUS at 02:57

## 2024-01-01 RX ADMIN — HYDROMORPHONE HYDROCHLORIDE 2 MG: 2 INJECTION, SOLUTION INTRAMUSCULAR; INTRAVENOUS; SUBCUTANEOUS at 11:36

## 2024-01-01 RX ADMIN — LORAZEPAM 1 MG: 2 INJECTION INTRAMUSCULAR; INTRAVENOUS at 15:45

## 2024-01-01 RX ADMIN — LORAZEPAM 1 MG: 2 INJECTION INTRAMUSCULAR; INTRAVENOUS at 12:07

## 2024-01-01 RX ADMIN — LORAZEPAM 1 MG: 2 INJECTION INTRAMUSCULAR; INTRAVENOUS at 03:44

## 2024-01-01 RX ADMIN — LORAZEPAM 1 MG: 2 INJECTION INTRAMUSCULAR; INTRAVENOUS at 16:50

## 2024-01-01 RX ADMIN — LORAZEPAM 1 MG: 2 INJECTION INTRAMUSCULAR; INTRAVENOUS at 09:22

## 2024-01-01 RX ADMIN — LORAZEPAM 2 MG: 2 INJECTION INTRAMUSCULAR; INTRAVENOUS at 00:05

## 2024-01-01 RX ADMIN — LORAZEPAM 1 MG: 2 INJECTION INTRAMUSCULAR; INTRAVENOUS at 11:41

## 2024-01-01 RX ADMIN — HYDROMORPHONE HYDROCHLORIDE 2 MG: 2 INJECTION, SOLUTION INTRAMUSCULAR; INTRAVENOUS; SUBCUTANEOUS at 12:09

## 2024-01-01 RX ADMIN — LORAZEPAM 1 MG: 2 INJECTION INTRAMUSCULAR; INTRAVENOUS at 20:43

## 2024-01-01 RX ADMIN — HYDROMORPHONE HYDROCHLORIDE 2 MG: 2 INJECTION, SOLUTION INTRAMUSCULAR; INTRAVENOUS; SUBCUTANEOUS at 04:29

## 2024-01-01 RX ADMIN — LORAZEPAM 1 MG: 2 INJECTION INTRAMUSCULAR; INTRAVENOUS at 12:01

## 2024-01-01 RX ADMIN — HYDROMORPHONE HYDROCHLORIDE 1 MG: 1 INJECTION, SOLUTION INTRAMUSCULAR; INTRAVENOUS; SUBCUTANEOUS at 15:52

## 2024-01-01 RX ADMIN — LORAZEPAM 1 MG: 2 INJECTION INTRAMUSCULAR; INTRAVENOUS at 00:23

## 2024-01-01 RX ADMIN — HYDROMORPHONE HYDROCHLORIDE 3 MG: 2 INJECTION, SOLUTION INTRAMUSCULAR; INTRAVENOUS; SUBCUTANEOUS at 00:12

## 2024-01-01 RX ADMIN — SODIUM CHLORIDE, PRESERVATIVE FREE 10 ML: 5 INJECTION INTRAVENOUS at 08:35

## 2024-01-01 RX ADMIN — LORAZEPAM 2 MG: 2 INJECTION INTRAMUSCULAR; INTRAVENOUS at 15:09

## 2024-01-01 RX ADMIN — HYDROMORPHONE HYDROCHLORIDE 2 MG: 2 INJECTION, SOLUTION INTRAMUSCULAR; INTRAVENOUS; SUBCUTANEOUS at 09:14

## 2024-01-01 RX ADMIN — LORAZEPAM 1 MG: 2 INJECTION INTRAMUSCULAR; INTRAVENOUS at 04:47

## 2024-01-01 RX ADMIN — HYDROMORPHONE HYDROCHLORIDE 1 MG: 1 INJECTION, SOLUTION INTRAMUSCULAR; INTRAVENOUS; SUBCUTANEOUS at 23:38

## 2024-01-01 RX ADMIN — LORAZEPAM 1 MG: 2 INJECTION INTRAMUSCULAR; INTRAVENOUS at 15:53

## 2024-01-01 ASSESSMENT — PAIN SCALES - GENERAL
PAINLEVEL_OUTOF10: 1
PAINLEVEL_OUTOF10: 0
PAINLEVEL_OUTOF10: 1
PAINLEVEL_OUTOF10: 0
PAINLEVEL_OUTOF10: 1
PAINLEVEL_OUTOF10: 0

## 2024-01-01 ASSESSMENT — PAIN DESCRIPTION - LOCATION
LOCATION: OTHER (COMMENT)
LOCATION: GENERALIZED
LOCATION: OTHER (COMMENT)
LOCATION: OTHER (COMMENT)

## 2024-01-04 ENCOUNTER — PATIENT MESSAGE (OUTPATIENT)
Facility: CLINIC | Age: 84
End: 2024-01-04

## 2024-01-04 DIAGNOSIS — M47.22 OSTEOARTHRITIS OF CERVICAL SPINE WITH MYELOPATHY AND RADICULOPATHY: Primary | ICD-10-CM

## 2024-01-04 DIAGNOSIS — M47.12 OSTEOARTHRITIS OF CERVICAL SPINE WITH MYELOPATHY AND RADICULOPATHY: Primary | ICD-10-CM

## 2024-01-04 RX ORDER — TRAMADOL HYDROCHLORIDE 50 MG/1
50 TABLET ORAL 2 TIMES DAILY PRN
Qty: 30 TABLET | Refills: 0 | Status: SHIPPED | OUTPATIENT
Start: 2024-01-04 | End: 2024-01-19

## 2024-01-04 NOTE — TELEPHONE ENCOUNTER
From: Jone Meneses  To: Dr. Jeovany Barrientos  Sent: 1/4/2024 3:49 PM EST  Subject: Pain med    Will you give Jone something for pain. He says his joints hurt and Tylenol does not help. He is very uncomfortable. Also, is it time for hospice, perhaps?

## 2024-01-08 ENCOUNTER — HOSPITAL ENCOUNTER (INPATIENT)
Facility: HOSPITAL | Age: 84
LOS: 11 days | Discharge: NURSING, REHAB OR LONG-TERM CARE | End: 2024-01-19
Attending: EMERGENCY MEDICINE | Admitting: INTERNAL MEDICINE
Payer: MEDICARE

## 2024-01-08 ENCOUNTER — APPOINTMENT (OUTPATIENT)
Facility: HOSPITAL | Age: 84
End: 2024-01-08
Payer: MEDICARE

## 2024-01-08 DIAGNOSIS — R26.2 AMBULATORY DYSFUNCTION: ICD-10-CM

## 2024-01-08 DIAGNOSIS — R62.7 ADULT FAILURE TO THRIVE: Primary | ICD-10-CM

## 2024-01-08 PROBLEM — A41.9 SEPSIS (HCC): Status: ACTIVE | Noted: 2024-01-08

## 2024-01-08 LAB
ALBUMIN SERPL-MCNC: 3.6 G/DL (ref 3.5–5)
ALBUMIN/GLOB SERPL: 0.8 (ref 1.1–2.2)
ALP SERPL-CCNC: 46 U/L (ref 45–117)
ALT SERPL-CCNC: 28 U/L (ref 12–78)
ANION GAP SERPL CALC-SCNC: 2 MMOL/L (ref 5–15)
APPEARANCE UR: CLEAR
AST SERPL-CCNC: 66 U/L (ref 15–37)
B PERT DNA SPEC QL NAA+PROBE: NOT DETECTED
BACTERIA URNS QL MICRO: NEGATIVE /HPF
BASOPHILS # BLD: 0.1 K/UL (ref 0–0.1)
BASOPHILS NFR BLD: 1 % (ref 0–1)
BILIRUB SERPL-MCNC: 2.1 MG/DL (ref 0.2–1)
BILIRUB UR QL: NEGATIVE
BORDETELLA PARAPERTUSSIS BY PCR: NOT DETECTED
BUN SERPL-MCNC: 30 MG/DL (ref 6–20)
BUN/CREAT SERPL: 14 (ref 12–20)
C PNEUM DNA SPEC QL NAA+PROBE: NOT DETECTED
CALCIUM SERPL-MCNC: 9.5 MG/DL (ref 8.5–10.1)
CHLORIDE SERPL-SCNC: 110 MMOL/L (ref 97–108)
CO2 SERPL-SCNC: 30 MMOL/L (ref 21–32)
COLOR UR: ABNORMAL
CREAT SERPL-MCNC: 2.14 MG/DL (ref 0.7–1.3)
DIFFERENTIAL METHOD BLD: ABNORMAL
EOSINOPHIL # BLD: 0.3 K/UL (ref 0–0.4)
EOSINOPHIL NFR BLD: 5 % (ref 0–7)
EPITH CASTS URNS QL MICRO: ABNORMAL /LPF
ERYTHROCYTE [DISTWIDTH] IN BLOOD BY AUTOMATED COUNT: 13.1 % (ref 11.5–14.5)
FLUAV SUBTYP SPEC NAA+PROBE: NOT DETECTED
FLUBV RNA SPEC QL NAA+PROBE: NOT DETECTED
GLOBULIN SER CALC-MCNC: 4.5 G/DL (ref 2–4)
GLUCOSE SERPL-MCNC: 101 MG/DL (ref 65–100)
GLUCOSE UR STRIP.AUTO-MCNC: NEGATIVE MG/DL
HADV DNA SPEC QL NAA+PROBE: NOT DETECTED
HCOV 229E RNA SPEC QL NAA+PROBE: NOT DETECTED
HCOV HKU1 RNA SPEC QL NAA+PROBE: NOT DETECTED
HCOV NL63 RNA SPEC QL NAA+PROBE: NOT DETECTED
HCOV OC43 RNA SPEC QL NAA+PROBE: NOT DETECTED
HCT VFR BLD AUTO: 45.5 % (ref 36.6–50.3)
HGB BLD-MCNC: 14 G/DL (ref 12.1–17)
HGB UR QL STRIP: NEGATIVE
HMPV RNA SPEC QL NAA+PROBE: NOT DETECTED
HPIV1 RNA SPEC QL NAA+PROBE: NOT DETECTED
HPIV2 RNA SPEC QL NAA+PROBE: NOT DETECTED
HPIV3 RNA SPEC QL NAA+PROBE: NOT DETECTED
HPIV4 RNA SPEC QL NAA+PROBE: NOT DETECTED
HYALINE CASTS URNS QL MICRO: ABNORMAL /LPF (ref 0–2)
IMM GRANULOCYTES # BLD AUTO: 0 K/UL (ref 0–0.04)
IMM GRANULOCYTES NFR BLD AUTO: 0 % (ref 0–0.5)
KETONES UR QL STRIP.AUTO: NEGATIVE MG/DL
LACTATE BLD-SCNC: 1.02 MMOL/L (ref 0.4–2)
LEUKOCYTE ESTERASE UR QL STRIP.AUTO: NEGATIVE
LYMPHOCYTES # BLD: 1.4 K/UL (ref 0.8–3.5)
LYMPHOCYTES NFR BLD: 23 % (ref 12–49)
M PNEUMO DNA SPEC QL NAA+PROBE: NOT DETECTED
MCH RBC QN AUTO: 29 PG (ref 26–34)
MCHC RBC AUTO-ENTMCNC: 30.8 G/DL (ref 30–36.5)
MCV RBC AUTO: 94.4 FL (ref 80–99)
MONOCYTES # BLD: 1.1 K/UL (ref 0–1)
MONOCYTES NFR BLD: 18 % (ref 5–13)
NEUTS SEG # BLD: 3.3 K/UL (ref 1.8–8)
NEUTS SEG NFR BLD: 53 % (ref 32–75)
NITRITE UR QL STRIP.AUTO: NEGATIVE
NRBC # BLD: 0 K/UL (ref 0–0.01)
NRBC BLD-RTO: 0 PER 100 WBC
PH UR STRIP: 5.5 (ref 5–8)
PLATELET # BLD AUTO: 170 K/UL (ref 150–400)
PMV BLD AUTO: 11.5 FL (ref 8.9–12.9)
POTASSIUM SERPL-SCNC: 4.4 MMOL/L (ref 3.5–5.1)
PROT SERPL-MCNC: 8.1 G/DL (ref 6.4–8.2)
PROT UR STRIP-MCNC: ABNORMAL MG/DL
RBC # BLD AUTO: 4.82 M/UL (ref 4.1–5.7)
RBC #/AREA URNS HPF: ABNORMAL /HPF (ref 0–5)
RSV RNA SPEC QL NAA+PROBE: NOT DETECTED
RV+EV RNA SPEC QL NAA+PROBE: NOT DETECTED
SARS-COV-2 RDRP RESP QL NAA+PROBE: NOT DETECTED
SARS-COV-2 RNA RESP QL NAA+PROBE: NOT DETECTED
SODIUM SERPL-SCNC: 142 MMOL/L (ref 136–145)
SOURCE: NORMAL
SP GR UR REFRACTOMETRY: 1.02
TROPONIN I SERPL HS-MCNC: 39 NG/L (ref 0–76)
URINE CULTURE IF INDICATED: ABNORMAL
UROBILINOGEN UR QL STRIP.AUTO: 1 EU/DL (ref 0.2–1)
WBC # BLD AUTO: 6.2 K/UL (ref 4.1–11.1)
WBC URNS QL MICRO: ABNORMAL /HPF (ref 0–4)

## 2024-01-08 PROCEDURE — 84484 ASSAY OF TROPONIN QUANT: CPT

## 2024-01-08 PROCEDURE — 74176 CT ABD & PELVIS W/O CONTRAST: CPT

## 2024-01-08 PROCEDURE — 6360000002 HC RX W HCPCS: Performed by: EMERGENCY MEDICINE

## 2024-01-08 PROCEDURE — 6370000000 HC RX 637 (ALT 250 FOR IP): Performed by: EMERGENCY MEDICINE

## 2024-01-08 PROCEDURE — 36415 COLL VENOUS BLD VENIPUNCTURE: CPT

## 2024-01-08 PROCEDURE — 87635 SARS-COV-2 COVID-19 AMP PRB: CPT

## 2024-01-08 PROCEDURE — 99285 EMERGENCY DEPT VISIT HI MDM: CPT

## 2024-01-08 PROCEDURE — 1100000003 HC PRIVATE W/ TELEMETRY

## 2024-01-08 PROCEDURE — 80053 COMPREHEN METABOLIC PANEL: CPT

## 2024-01-08 PROCEDURE — 85025 COMPLETE CBC W/AUTO DIFF WBC: CPT

## 2024-01-08 PROCEDURE — 2580000003 HC RX 258: Performed by: EMERGENCY MEDICINE

## 2024-01-08 PROCEDURE — 71045 X-RAY EXAM CHEST 1 VIEW: CPT

## 2024-01-08 PROCEDURE — 70450 CT HEAD/BRAIN W/O DYE: CPT

## 2024-01-08 PROCEDURE — 93005 ELECTROCARDIOGRAM TRACING: CPT | Performed by: EMERGENCY MEDICINE

## 2024-01-08 PROCEDURE — 0202U NFCT DS 22 TRGT SARS-COV-2: CPT

## 2024-01-08 PROCEDURE — 81001 URINALYSIS AUTO W/SCOPE: CPT

## 2024-01-08 PROCEDURE — 87040 BLOOD CULTURE FOR BACTERIA: CPT

## 2024-01-08 PROCEDURE — 83605 ASSAY OF LACTIC ACID: CPT

## 2024-01-08 RX ORDER — ENOXAPARIN SODIUM 100 MG/ML
30 INJECTION SUBCUTANEOUS DAILY
Status: DISCONTINUED | OUTPATIENT
Start: 2024-01-09 | End: 2024-01-10

## 2024-01-08 RX ORDER — TRAMADOL HYDROCHLORIDE 50 MG/1
50 TABLET ORAL EVERY 6 HOURS PRN
Status: DISCONTINUED | OUTPATIENT
Start: 2024-01-08 | End: 2024-01-09

## 2024-01-08 RX ORDER — SODIUM CHLORIDE 9 MG/ML
INJECTION, SOLUTION INTRAVENOUS PRN
Status: DISCONTINUED | OUTPATIENT
Start: 2024-01-08 | End: 2024-01-19 | Stop reason: HOSPADM

## 2024-01-08 RX ORDER — SALSALATE 500 MG
500 TABLET ORAL 2 TIMES DAILY
Status: DISCONTINUED | OUTPATIENT
Start: 2024-01-08 | End: 2024-01-10

## 2024-01-08 RX ORDER — PRAVASTATIN SODIUM 40 MG
80 TABLET ORAL
Status: DISCONTINUED | OUTPATIENT
Start: 2024-01-08 | End: 2024-01-19 | Stop reason: HOSPADM

## 2024-01-08 RX ORDER — 0.9 % SODIUM CHLORIDE 0.9 %
1000 INTRAVENOUS SOLUTION INTRAVENOUS ONCE
Status: COMPLETED | OUTPATIENT
Start: 2024-01-08 | End: 2024-01-08

## 2024-01-08 RX ORDER — ACETAMINOPHEN 500 MG
1000 TABLET ORAL
Status: COMPLETED | OUTPATIENT
Start: 2024-01-08 | End: 2024-01-08

## 2024-01-08 RX ORDER — ONDANSETRON 4 MG/1
4 TABLET, ORALLY DISINTEGRATING ORAL EVERY 8 HOURS PRN
Status: DISCONTINUED | OUTPATIENT
Start: 2024-01-08 | End: 2024-01-19 | Stop reason: HOSPADM

## 2024-01-08 RX ORDER — MEMANTINE HYDROCHLORIDE 10 MG/1
10 TABLET ORAL 2 TIMES DAILY
Status: DISCONTINUED | OUTPATIENT
Start: 2024-01-08 | End: 2024-01-19 | Stop reason: HOSPADM

## 2024-01-08 RX ORDER — POLYETHYLENE GLYCOL 3350 17 G/17G
17 POWDER, FOR SOLUTION ORAL DAILY PRN
Status: DISCONTINUED | OUTPATIENT
Start: 2024-01-08 | End: 2024-01-19 | Stop reason: HOSPADM

## 2024-01-08 RX ORDER — DUTASTERIDE AND TAMSULOSIN HYDROCHLORIDE CAPSULES .5; .4 MG/1; MG/1
1 CAPSULE ORAL DAILY
Status: DISCONTINUED | OUTPATIENT
Start: 2024-01-09 | End: 2024-01-09

## 2024-01-08 RX ORDER — ASPIRIN 81 MG/1
81 TABLET ORAL DAILY
Status: DISCONTINUED | OUTPATIENT
Start: 2024-01-09 | End: 2024-01-19 | Stop reason: HOSPADM

## 2024-01-08 RX ORDER — SODIUM CHLORIDE 0.9 % (FLUSH) 0.9 %
5-40 SYRINGE (ML) INJECTION EVERY 12 HOURS SCHEDULED
Status: DISCONTINUED | OUTPATIENT
Start: 2024-01-08 | End: 2024-01-19 | Stop reason: HOSPADM

## 2024-01-08 RX ORDER — DEXTROSE MONOHYDRATE, SODIUM CHLORIDE, AND POTASSIUM CHLORIDE 50; 1.49; 4.5 G/1000ML; G/1000ML; G/1000ML
INJECTION, SOLUTION INTRAVENOUS CONTINUOUS
Status: DISPENSED | OUTPATIENT
Start: 2024-01-08 | End: 2024-01-10

## 2024-01-08 RX ORDER — SODIUM CHLORIDE 0.9 % (FLUSH) 0.9 %
5-40 SYRINGE (ML) INJECTION PRN
Status: DISCONTINUED | OUTPATIENT
Start: 2024-01-08 | End: 2024-01-19 | Stop reason: HOSPADM

## 2024-01-08 RX ORDER — ACETAMINOPHEN 650 MG/1
650 SUPPOSITORY RECTAL EVERY 6 HOURS PRN
Status: DISCONTINUED | OUTPATIENT
Start: 2024-01-08 | End: 2024-01-09

## 2024-01-08 RX ORDER — ACETAMINOPHEN 325 MG/1
650 TABLET ORAL EVERY 6 HOURS PRN
Status: DISCONTINUED | OUTPATIENT
Start: 2024-01-08 | End: 2024-01-09

## 2024-01-08 RX ORDER — FLUOXETINE 10 MG/1
10 CAPSULE ORAL DAILY
Status: DISCONTINUED | OUTPATIENT
Start: 2024-01-09 | End: 2024-01-19 | Stop reason: HOSPADM

## 2024-01-08 RX ORDER — ONDANSETRON 2 MG/ML
4 INJECTION INTRAMUSCULAR; INTRAVENOUS EVERY 6 HOURS PRN
Status: DISCONTINUED | OUTPATIENT
Start: 2024-01-08 | End: 2024-01-19 | Stop reason: HOSPADM

## 2024-01-08 RX ADMIN — SODIUM CHLORIDE 1000 ML: 9 INJECTION, SOLUTION INTRAVENOUS at 15:58

## 2024-01-08 RX ADMIN — ACETAMINOPHEN 1000 MG: 500 TABLET ORAL at 19:55

## 2024-01-08 RX ADMIN — CEFTRIAXONE 1000 MG: 1 INJECTION, POWDER, FOR SOLUTION INTRAMUSCULAR; INTRAVENOUS at 19:54

## 2024-01-08 ASSESSMENT — LIFESTYLE VARIABLES
HOW OFTEN DO YOU HAVE A DRINK CONTAINING ALCOHOL: NEVER
HOW MANY STANDARD DRINKS CONTAINING ALCOHOL DO YOU HAVE ON A TYPICAL DAY: PATIENT DOES NOT DRINK

## 2024-01-08 ASSESSMENT — PAIN DESCRIPTION - PAIN TYPE: TYPE: ACUTE PAIN;CHRONIC PAIN

## 2024-01-08 ASSESSMENT — PAIN - FUNCTIONAL ASSESSMENT: PAIN_FUNCTIONAL_ASSESSMENT: 0-10

## 2024-01-08 ASSESSMENT — PAIN SCALES - GENERAL: PAINLEVEL_OUTOF10: 7

## 2024-01-08 ASSESSMENT — PAIN DESCRIPTION - ONSET: ONSET: AWAKENED FROM SLEEP

## 2024-01-08 ASSESSMENT — PAIN DESCRIPTION - LOCATION: LOCATION: GENERALIZED

## 2024-01-08 ASSESSMENT — PAIN DESCRIPTION - DESCRIPTORS: DESCRIPTORS: ACHING

## 2024-01-08 NOTE — ED PROVIDER NOTES
[facility]ospitals EMERGENCY DEPT  EMERGENCY DEPARTMENT ENCOUNTER       Pt Name: Jone Meneses  MRN: [id number]  Birthdate 1940  Date of evaluation: 1/8/2024  Provider: Primo Laurent MD   PCP: Jeovany Barrientos MD  Note Started: 8:50 PM 1/8/24     CHIEF COMPLAINT       Chief Complaint   Patient presents with    Failure To Thrive        HISTORY OF PRESENT ILLNESS: 1 or more elements      History From: Wife, History limited by: Dementia     Jone Meneses is a 83 y.o. male who presents with generalized weakness, decreased appetite.  Patient previously was able to transition from bed to commode however since Wednesday has been bedbound.  Has had decreased appetite, has been sleeping more, generalized arthritic osteoarthritic pain.  Wife called PCP who instructed him to come to the ER, [facility]  He has had a mild cough recently as well.  Recent flu COVID and urine test were unremarkable.     Nursing Notes were all reviewed and agreed with or any disagreements were addressed in the HPI.     REVIEW OF SYSTEMS        Positives and Pertinent negatives as per HPI.    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, type 1    CAD (coronary artery disease)     Chronic kidney disease     polycystic kidney disease     Past Surgical History:  Past Surgical History:   Procedure Laterality Date    CABG, ARTERY-VEIN, THREE  4/18/2007    COLONOSCOPY      ORTHOPEDIC SURGERY      bilateral bunionectomy    PACEMAKER      VASCULAR SURGERY       Family History:  Family History   Problem Relation Age of Onset    Asthma Son     No Known Problems Sister     No Known Problems Father     No Known Problems Mother     Other Brother         old age     Social History:  Social History     Tobacco Use    Smoking status: Never    Smokeless tobacco: Never   Substance Use Topics    Alcohol use: No    Drug use: No

## 2024-01-09 PROBLEM — M25.50 POLYARTHRALGIA: Status: ACTIVE | Noted: 2024-01-09

## 2024-01-09 PROBLEM — A41.9 SEPSIS (HCC): Status: RESOLVED | Noted: 2024-01-08 | Resolved: 2024-01-09

## 2024-01-09 PROBLEM — F03.90 DEMENTIA WITHOUT BEHAVIORAL DISTURBANCE (HCC): Status: RESOLVED | Noted: 2021-08-08 | Resolved: 2024-01-09

## 2024-01-09 LAB
ALBUMIN SERPL-MCNC: 3 G/DL (ref 3.5–5)
ALBUMIN/GLOB SERPL: 0.7 (ref 1.1–2.2)
ALP SERPL-CCNC: 37 U/L (ref 45–117)
ALT SERPL-CCNC: 24 U/L (ref 12–78)
ANION GAP SERPL CALC-SCNC: 3 MMOL/L (ref 5–15)
AST SERPL-CCNC: 46 U/L (ref 15–37)
BASOPHILS # BLD: 0.1 K/UL (ref 0–0.1)
BASOPHILS NFR BLD: 1 % (ref 0–1)
BILIRUB SERPL-MCNC: 1.3 MG/DL (ref 0.2–1)
BUN SERPL-MCNC: 30 MG/DL (ref 6–20)
BUN/CREAT SERPL: 14 (ref 12–20)
CALCIUM SERPL-MCNC: 8.6 MG/DL (ref 8.5–10.1)
CHLORIDE SERPL-SCNC: 111 MMOL/L (ref 97–108)
CK SERPL-CCNC: 1167 U/L (ref 39–308)
CO2 SERPL-SCNC: 24 MMOL/L (ref 21–32)
CREAT SERPL-MCNC: 2.11 MG/DL (ref 0.7–1.3)
DIFFERENTIAL METHOD BLD: ABNORMAL
EKG ATRIAL RATE: 75 BPM
EKG DIAGNOSIS: NORMAL
EKG P AXIS: 46 DEGREES
EKG Q-T INTERVAL: 426 MS
EKG QRS DURATION: 144 MS
EKG QTC CALCULATION (BAZETT): 475 MS
EKG R AXIS: -85 DEGREES
EKG T AXIS: 97 DEGREES
EKG VENTRICULAR RATE: 75 BPM
EOSINOPHIL # BLD: 0.3 K/UL (ref 0–0.4)
EOSINOPHIL NFR BLD: 4 % (ref 0–7)
ERYTHROCYTE [DISTWIDTH] IN BLOOD BY AUTOMATED COUNT: 12.7 % (ref 11.5–14.5)
FOLATE SERPL-MCNC: 11.9 NG/ML (ref 5–21)
GLOBULIN SER CALC-MCNC: 4.1 G/DL (ref 2–4)
GLUCOSE SERPL-MCNC: 120 MG/DL (ref 65–100)
HCT VFR BLD AUTO: 42.5 % (ref 36.6–50.3)
HGB BLD-MCNC: 13.1 G/DL (ref 12.1–17)
IMM GRANULOCYTES # BLD AUTO: 0 K/UL (ref 0–0.04)
IMM GRANULOCYTES NFR BLD AUTO: 0 % (ref 0–0.5)
LYMPHOCYTES # BLD: 1.7 K/UL (ref 0.8–3.5)
LYMPHOCYTES NFR BLD: 25 % (ref 12–49)
MCH RBC QN AUTO: 28.6 PG (ref 26–34)
MCHC RBC AUTO-ENTMCNC: 30.8 G/DL (ref 30–36.5)
MCV RBC AUTO: 92.8 FL (ref 80–99)
MONOCYTES # BLD: 1.8 K/UL (ref 0–1)
MONOCYTES NFR BLD: 27 % (ref 5–13)
NEUTS SEG # BLD: 2.8 K/UL (ref 1.8–8)
NEUTS SEG NFR BLD: 43 % (ref 32–75)
NRBC # BLD: 0 K/UL (ref 0–0.01)
NRBC BLD-RTO: 0 PER 100 WBC
PLATELET # BLD AUTO: 149 K/UL (ref 150–400)
PMV BLD AUTO: 10.5 FL (ref 8.9–12.9)
POTASSIUM SERPL-SCNC: 4.1 MMOL/L (ref 3.5–5.1)
PROCALCITONIN SERPL-MCNC: 0.09 NG/ML
PROT SERPL-MCNC: 7.1 G/DL (ref 6.4–8.2)
RBC # BLD AUTO: 4.58 M/UL (ref 4.1–5.7)
RBC MORPH BLD: ABNORMAL
SODIUM SERPL-SCNC: 138 MMOL/L (ref 136–145)
TSH SERPL DL<=0.05 MIU/L-ACNC: 1.87 UIU/ML (ref 0.36–3.74)
VIT B12 SERPL-MCNC: 1160 PG/ML (ref 193–986)
WBC # BLD AUTO: 6.7 K/UL (ref 4.1–11.1)
WBC MORPH BLD: ABNORMAL

## 2024-01-09 PROCEDURE — 80053 COMPREHEN METABOLIC PANEL: CPT

## 2024-01-09 PROCEDURE — 84443 ASSAY THYROID STIM HORMONE: CPT

## 2024-01-09 PROCEDURE — 2580000003 HC RX 258: Performed by: GENERAL ACUTE CARE HOSPITAL

## 2024-01-09 PROCEDURE — 6370000000 HC RX 637 (ALT 250 FOR IP): Performed by: GENERAL ACUTE CARE HOSPITAL

## 2024-01-09 PROCEDURE — 92610 EVALUATE SWALLOWING FUNCTION: CPT

## 2024-01-09 PROCEDURE — 82550 ASSAY OF CK (CPK): CPT

## 2024-01-09 PROCEDURE — 97110 THERAPEUTIC EXERCISES: CPT

## 2024-01-09 PROCEDURE — 2500000003 HC RX 250 WO HCPCS: Performed by: GENERAL ACUTE CARE HOSPITAL

## 2024-01-09 PROCEDURE — 97530 THERAPEUTIC ACTIVITIES: CPT

## 2024-01-09 PROCEDURE — 1100000003 HC PRIVATE W/ TELEMETRY

## 2024-01-09 PROCEDURE — 82607 VITAMIN B-12: CPT

## 2024-01-09 PROCEDURE — 6360000002 HC RX W HCPCS: Performed by: GENERAL ACUTE CARE HOSPITAL

## 2024-01-09 PROCEDURE — 97162 PT EVAL MOD COMPLEX 30 MIN: CPT

## 2024-01-09 PROCEDURE — 97166 OT EVAL MOD COMPLEX 45 MIN: CPT | Performed by: OCCUPATIONAL THERAPIST

## 2024-01-09 PROCEDURE — 99222 1ST HOSP IP/OBS MODERATE 55: CPT | Performed by: INTERNAL MEDICINE

## 2024-01-09 PROCEDURE — 6370000000 HC RX 637 (ALT 250 FOR IP): Performed by: INTERNAL MEDICINE

## 2024-01-09 PROCEDURE — 6370000000 HC RX 637 (ALT 250 FOR IP): Performed by: NURSE PRACTITIONER

## 2024-01-09 PROCEDURE — 82306 VITAMIN D 25 HYDROXY: CPT

## 2024-01-09 PROCEDURE — 84145 PROCALCITONIN (PCT): CPT

## 2024-01-09 PROCEDURE — 97112 NEUROMUSCULAR REEDUCATION: CPT | Performed by: OCCUPATIONAL THERAPIST

## 2024-01-09 PROCEDURE — 36415 COLL VENOUS BLD VENIPUNCTURE: CPT

## 2024-01-09 PROCEDURE — 85025 COMPLETE CBC W/AUTO DIFF WBC: CPT

## 2024-01-09 PROCEDURE — 82746 ASSAY OF FOLIC ACID SERUM: CPT

## 2024-01-09 PROCEDURE — 97530 THERAPEUTIC ACTIVITIES: CPT | Performed by: OCCUPATIONAL THERAPIST

## 2024-01-09 RX ORDER — TAMSULOSIN HYDROCHLORIDE 0.4 MG/1
0.4 CAPSULE ORAL DAILY
Status: DISCONTINUED | OUTPATIENT
Start: 2024-01-09 | End: 2024-01-19 | Stop reason: HOSPADM

## 2024-01-09 RX ORDER — BENZOCAINE/MENTHOL 6 MG-10 MG
LOZENGE MUCOUS MEMBRANE 2 TIMES DAILY
Status: DISCONTINUED | OUTPATIENT
Start: 2024-01-09 | End: 2024-01-19 | Stop reason: HOSPADM

## 2024-01-09 RX ORDER — PREDNISONE 5 MG/1
10 TABLET ORAL 2 TIMES DAILY
Status: DISCONTINUED | OUTPATIENT
Start: 2024-01-09 | End: 2024-01-13

## 2024-01-09 RX ORDER — FINASTERIDE 5 MG/1
5 TABLET, FILM COATED ORAL DAILY
Status: DISCONTINUED | OUTPATIENT
Start: 2024-01-09 | End: 2024-01-19 | Stop reason: HOSPADM

## 2024-01-09 RX ORDER — ACETAMINOPHEN 500 MG
1000 TABLET ORAL EVERY 8 HOURS
Status: DISCONTINUED | OUTPATIENT
Start: 2024-01-09 | End: 2024-01-19 | Stop reason: HOSPADM

## 2024-01-09 RX ADMIN — MEMANTINE 10 MG: 10 TABLET ORAL at 00:14

## 2024-01-09 RX ADMIN — ENOXAPARIN SODIUM 30 MG: 100 INJECTION SUBCUTANEOUS at 10:01

## 2024-01-09 RX ADMIN — SODIUM CHLORIDE, PRESERVATIVE FREE 10 ML: 5 INJECTION INTRAVENOUS at 21:11

## 2024-01-09 RX ADMIN — FLUOXETINE 10 MG: 10 CAPSULE ORAL at 10:01

## 2024-01-09 RX ADMIN — SODIUM CHLORIDE, PRESERVATIVE FREE 10 ML: 5 INJECTION INTRAVENOUS at 10:02

## 2024-01-09 RX ADMIN — FINASTERIDE 5 MG: 5 TABLET, FILM COATED ORAL at 13:18

## 2024-01-09 RX ADMIN — ACETAMINOPHEN 1000 MG: 500 TABLET ORAL at 23:55

## 2024-01-09 RX ADMIN — MEMANTINE 10 MG: 10 TABLET ORAL at 10:01

## 2024-01-09 RX ADMIN — PRAVASTATIN SODIUM 80 MG: 40 TABLET ORAL at 21:08

## 2024-01-09 RX ADMIN — HYDROCORTISONE: 0.01 CREAM TOPICAL at 21:14

## 2024-01-09 RX ADMIN — HYDROCORTISONE: 0.01 CREAM TOPICAL at 13:17

## 2024-01-09 RX ADMIN — PRAVASTATIN SODIUM 80 MG: 40 TABLET ORAL at 00:14

## 2024-01-09 RX ADMIN — PREDNISONE 10 MG: 5 TABLET ORAL at 23:55

## 2024-01-09 RX ADMIN — SODIUM CHLORIDE, PRESERVATIVE FREE 10 ML: 5 INJECTION INTRAVENOUS at 00:15

## 2024-01-09 RX ADMIN — POTASSIUM CHLORIDE, DEXTROSE MONOHYDRATE AND SODIUM CHLORIDE: 150; 5; 450 INJECTION, SOLUTION INTRAVENOUS at 00:14

## 2024-01-09 RX ADMIN — POTASSIUM CHLORIDE, DEXTROSE MONOHYDRATE AND SODIUM CHLORIDE: 150; 5; 450 INJECTION, SOLUTION INTRAVENOUS at 13:24

## 2024-01-09 RX ADMIN — ASPIRIN 81 MG: 81 TABLET, COATED ORAL at 10:01

## 2024-01-09 RX ADMIN — TAMSULOSIN HYDROCHLORIDE 0.4 MG: 0.4 CAPSULE ORAL at 13:18

## 2024-01-09 RX ADMIN — MEMANTINE 10 MG: 10 TABLET ORAL at 21:09

## 2024-01-09 ASSESSMENT — PAIN SCALES - GENERAL
PAINLEVEL_OUTOF10: 0
PAINLEVEL_OUTOF10: 0
PAINLEVEL_OUTOF10: 6

## 2024-01-09 ASSESSMENT — PAIN DESCRIPTION - DESCRIPTORS: DESCRIPTORS: ACHING

## 2024-01-09 ASSESSMENT — PAIN DESCRIPTION - ORIENTATION: ORIENTATION: LEFT

## 2024-01-09 ASSESSMENT — PAIN DESCRIPTION - LOCATION: LOCATION: LEG

## 2024-01-09 NOTE — PROGRESS NOTES
Pt just transferred out of the ED to IP room.  OT will follow up later today for assessment as appropriate.

## 2024-01-09 NOTE — PLAN OF CARE
Speech LAnguage Pathology EVALUATION    Patient: Jone Meneses (83 y.o. male)  Date: 1/9/2024  Primary Diagnosis: Adult failure to thrive [R62.7]  Sepsis (HCC) [A41.9]  Ambulatory dysfunction [R26.2]       Precautions: aspiration precautions, Fall Risk, Bed Alarm                  ASSESSMENT :  Based on the objective data described below, the patient presents with mild oral deficits negatively impacted by missing dentition (partial upper/lower dentures not present at hospital). Patient presented trials of thin via straw, puree, and solid. Mastication noted to be prolonged likely d/t missing dentition. Additionally, RN present to give medications. Attempted one at a time, whole with liquid wash. Patient unable to clear pill from oral cavity. SLP presented puree wash which was successful in clearing. Patient required puree wash X2 to clear oral cavity of second pill. Recommend easy to chew diet with thin liquids. Medications whole, one at a time with puree. SLP will continue to follow for diet tolerance and education.     Patient will benefit from skilled intervention to address the above impairments.     PLAN :  Recommendations and Planned Interventions:  Diet: Easy to chew and thin liquids  --Medications whole, one at a time with puree (I.e. applesauce)   --Upright all PO intake   --Single bites/sips   --Alternate solids & liquids   --Slow rate  --Check for oral pocketing   --Oral hygiene 2-3x/day  --1:1 supervision/assistance        Acute SLP Services: Yes, SLP will continue to follow per plan of care.    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

## 2024-01-09 NOTE — H&P
Hospitalist Admission Note    NAME:   Jone Meneses   : 1940   MRN: [id number]     Date/Time: 2024 11:56 PM    Patient PCP: Jeovany Barrientos MD    ______________________________________________________________________  Given the patient's current clinical presentation, I have a high level of concern for decompensation if discharged from the emergency department.  Complex decision making was performed, which includes reviewing the patient's available past medical records, laboratory results, and x-ray films.       My assessment of this patient's clinical condition and my plan of care is as follows.    Assessment / Plan:  Febrile illness  Adult failure to thrive  Ambulatory dysfunction  ?  Worsening dementia  Monitor intake and output and daily weight  PT/OT   for placement  Follow-up cultures  Hold off antibiotics for now as no clear source of infection, monitor closely   Check CK  Check Procal  Check TSH, B12/Folate and Vit D  IVF  Resume home medications, but Hold diuretics while on IVF    CKD stage III  At baseline   Avoid nephrotoxic meds    BPH  Hypertension  Arthritis  CAD, s/p CABG  Second-degree heart block status post PPM  Resume home medications      Medical Decision Making:   I personally reviewed labs: yes as below   I personally reviewed imaging reports:yes as below   Toxic drug monitoring:   Discussed case with: ED provider. After discussion I am in agreement that acuity of patient's medical condition necessitates hospital stay.      Code Status: Full Code d/w wife  Baseline: demented, lives w wife at home, currently bed bound for a week    Subjective:   CHIEF COMPLAINT: Failure to thrive    HISTORY OF PRESENT ILLNESS:     Jone Meneses is a 83 y.o.  male with PMHx significant for  has a past medical history of Arthritis, Atrioventricular node arrhythmia, CAD (coronary artery disease), and Chronic kidney disease.     For the last week patient has been feeling weak and  unremarkable. ADDITIONAL COMMENTS: N/A     Cholecystolithiasis and additional incidentals as above. No biliary ductal dilation or other abnormality to correlate with elevated bilirubin.     CT HEAD WO CONTRAST    Result Date: 1/8/2024  CLINICAL HISTORY: dementia, hallucinations INDICATION: dementia, hallucinations COMPARISON: 12/28/2023. CT dose reduction was achieved through use of a standardized protocol tailored for this examination and automatic exposure control for dose modulation. TECHNIQUE: Serial axial images with a collimation of 5 mm were obtained from the skull base through the vertex  FINDINGS: There is sulcal and ventricular prominence. Confluent periventricular and scattered foci of hypodensity in the cerebral white matter. There is no evidence of an acute infarction, hemorrhage, or mass-effect. There is no evidence of midline shift or hydrocephalus. Posterior fossa structures are unremarkable. No extra-axial collections are seen. Mastoid air cells are well pneumatized and clear.  There is no evidence of depressed skull fractures of soft tissue swelling.     No acute intracranial process. Imaging findings consistent with moderate chronic microvascular ischemic change. There is a moderate degree of cerebral atrophy.      XR CHEST PORTABLE    Result Date: 1/8/2024  EXAM:  XR CHEST PORTABLE INDICATION: Sepsis COMPARISON: 10/25/2023 TECHNIQUE: portable chest AP view FINDINGS: Heart size is stable status post median sternotomy. AICD from the left appears stable. The pulmonary vasculature is within normal limits. The lungs and pleural spaces are clear. The visualized bones and upper abdomen are age-appropriate.     No acute process on portable chest.     CT HEAD WO CONTRAST    Result Date: 12/28/2023  EXAM: CT HEAD WO CONTRAST INDICATION: Syncope versus CVA earlier today, now resolved. COMPARISON: CT head on 5/28/2020. CONTRAST: None. TECHNIQUE: Unenhanced CT of the head was performed using 5 mm images.

## 2024-01-09 NOTE — PLAN OF CARE
Problem: Physical Therapy - Adult  Goal: By Discharge: Performs mobility at highest level of function for planned discharge setting.  See evaluation for individualized goals.  Description: FUNCTIONAL STATUS PRIOR TO ADMISSION: Pt lives with his wife and dtr. Dtr present and very supportive. Pt with hx of Alzheimers but had been able to amb with the rollator with someone walking with him and had help with dressing and bathing, getting into shower once a week with wife assist but dtr states this was very difficult. Dtr states that pt had a sudden decline and has not really been able to amb for 4-5 days.    HOME SUPPORT PRIOR TO ADMISSION: The patient lived with wife and dtr, see above.    Physical Therapy Goals  Initiated 1/9/2024  1.  Patient will move from supine to sit and sit to supine, scoot up and down, and roll side to side in bed with moderate assistance within 7 day(s).    2.  Patient will perform sit to stand with moderate assistance within 7 day(s).  3.  Patient will transfer from bed to chair and chair to bed with moderate assistance using the least restrictive device within 7 day(s).  4.  Patient will ambulate with moderate assistance for 10 feet with the least restrictive device within 7 day(s).       Outcome: Not Progressing   PHYSICAL THERAPY EVALUATION    Patient: Jone Meneses (83 y.o. male)  Date: 1/9/2024  Primary Diagnosis: Adult failure to thrive [R62.7]  Sepsis (HCC) [A41.9]  Ambulatory dysfunction [R26.2]       Precautions: Fall Risk, Bed Alarm                      ASSESSMENT :   DEFICITS/IMPAIRMENTS:   The patient is limited by decreased functional mobility, independence in ADLs, ROM, strength, sensation, activity tolerance, cognition, command following, attention/concentration, coordination, balance; he is received in bed with dtr present. Dtr very supportive and interested in anything she and pt's wife can adapt at home, receptive to all education. Pt was able to follow some simple  some  Cognition Comment: very fearful of falling           Strength:    Strength: Generally decreased, functional    Tone & Sensation:   Tone: Abnormal (very rigid)  Sensation: Impaired (very sensitive B feet)    Coordination:  Coordination: Generally decreased, functional    Range Of Motion:  AROM: Grossly decreased, non-functional  PROM: Generally decreased, functional    Functional Mobility:  Bed Mobility:     Bed Mobility Training  Bed Mobility Training: Yes  Overall Level of Assistance: Maximum assistance;Total assistance;Assist X2  Interventions: Manual cues;Safety awareness training;Tactile cues;Verbal cues  Rolling: Maximum assistance;Assist X2  Supine to Sit: Total assistance;Assist X2  Sit to Supine: Total assistance;Assist X2  Scooting: Maximum assistance;Assist X2 (pt is able to begin to sequence activity using arms on bed)  Transfers:     Transfer Training  Transfer Training: No  Balance:               Balance  Sitting: Impaired  Sitting - Static: Poor (constant support);Fair (occasional) (initially with severe posterior lean, fearful of moving forward; improved w/ rocking and cues to CGA statically)  Sitting - Dynamic: Poor (constant support)  Standing: Impaired  Standing - Static:  (unable to stand)                                                                                                                                                                                                                                       [facility] AM-PAC®      Basic Mobility Inpatient Short Form (6-Clicks) Version 2  How much HELP from another person do you currently need... (If the patient hasn't done an activity recently, how much help from another person do you think they would need if they tried?) Total A Lot A Little None   1.  Turning from your back to your side while in a flat bed without using bedrails? [x]  1 []  2 []  3  []  4   2.  Moving from lying on your back to sitting on the side of a flat

## 2024-01-09 NOTE — PROGRESS NOTES
Pt and family have been updated on plan of care. Pt is resting comfortably, no signs of acute distress.     Family at bedside

## 2024-01-09 NOTE — PROGRESS NOTES
Patient is a known to Dr. DILIA Barrientos. Provider informed that patient has been admitted to [facility].

## 2024-01-09 NOTE — CONSULTS
01/09/24        I have been asked to see this patient by Jeovany Barrientos MD  for advice/opinion re: -----CKD                                                        Assessment:         CKD stage IIIb-creatinine 2.14 at admission time  Polycystic kidneys  SANDRO on CKD possible-yet to be a ascertained  Prior history of BPH  Vascular dementia-  CAD s/p CABG  S/p second-degree heart block and pacemaker placement Discussion:       Creatinine during hospitalization has been trending 2.1-2.3 since 2022  Renal function is not far away from baseline.  Not sure if he has polycystic kidney disease versus multiple cysts in the kidneys  Urinalysis is unremarkable except for dark yellow urine  Renal ultrasound in the past showed polycystic kidneys  CT scan abdomen pelvis shows cholecystolithiasis shows cholecystolithiasis with mobility dilation and multiple renal cysts including simple and hyperdense  CT scan head showed  cerebral atrophy and chronic microvascular changes    Plan:   Will try to manage without Youngblood catheter.  Bladder scans will be needed.  Would agree with IV fluids.  Flomax and finasteride ordered separately in place of combination medicine which is not available in pharmacy here                   Thanks for consulting me. Renal service will follow patient with you.Please don't hesitate to contact me if any questions arise of if I can assist in any manner.      Ajay Rai MD  Cell no- [phone]  Available on perfect serve.          Signed By: Ajay Rai MD     January 9, 2024           Consult Date: 1/9/2024    Consults      Subjective   HISTORY OF PRESENTING ILLNESS :  Jone Meneses is a 83 y.o.,male ,Black /  with past medical history of arthritis, coronary artery disease and chronic kidney disease who has been admitted because of  3.5 K/UL    Monocytes Absolute 1.8 (H) 0.0 - 1.0 K/UL    Eosinophils Absolute 0.3 0.0 - 0.4 K/UL    Basophils Absolute 0.1 0.0 - 0.1 K/UL    Absolute Immature Granulocyte 0.0 0.00 - 0.04 K/UL    Differential Type SMEAR SCANNED      RBC Comment NORMOCYTIC, NORMOCHROMIC      WBC Comment REACTIVE LYMPHS     CK    Collection Time: 01/09/24  4:22 AM   Result Value Ref Range    Total CK 1,167 (H) 39 - 308 U/L   Procalcitonin    Collection Time: 01/09/24  4:22 AM   Result Value Ref Range    Procalcitonin 0.09 ng/mL   TSH    Collection Time: 01/09/24  4:22 AM   Result Value Ref Range    TSH, 3RD GENERATION 1.87 0.36 - 3.74 uIU/mL        I/O last 3 completed shifts:  In: -   Out: 600 [Urine:600]  No intake/output data recorded.     Current Shift: No intake/output data recorded.  Last 3 Shifts: 01/07 1901 - 01/09 0700  In: -   Out: 600 [Urine:600]  Physical Exam  Vitals and nursing note reviewed.   Constitutional:       Appearance: Normal appearance.   HENT:      Head: Normocephalic and atraumatic.      Nose: Nose normal.      Mouth/Throat:      Mouth: Mucous membranes are moist.   Cardiovascular:      Rate and Rhythm: Normal rate.      Pulses: Normal pulses.      Heart sounds: Normal heart sounds.   Pulmonary:      Effort: Pulmonary effort is normal.   Abdominal:      General: Abdomen is flat.   Musculoskeletal:      Cervical back: Neck supple.      Right lower leg: No edema.      Left lower leg: No edema.   Neurological:      General: No focal deficit present.      Mental Status: He is oriented to person, place, and time.          Data Review:   Recent Results (from the past 24 hour(s))   CBC with Auto Differential    Collection Time: 01/08/24  3:16 PM   Result Value Ref Range    WBC 6.2 4.1 - 11.1 K/uL    RBC 4.82 4.10 - 5.70 M/uL    Hemoglobin 14.0 12.1 - 17.0 g/dL    Hematocrit 45.5 36.6 - 50.3 %    MCV 94.4 80.0 - 99.0 FL    MCH 29.0 26.0 - 34.0 PG    MCHC 30.8 30.0 - 36.5 g/dL    RDW 13.1 11.5 - 14.5 %    Platelets 170

## 2024-01-09 NOTE — PROGRESS NOTES
End of Shift Note    Bedside shift change report given to Olesya TANNER (oncoming nurse) by ISHMAEL CAI RN (offgoing nurse).  Report included the following information Nurse Handoff Report, MAR, and Recent Results      Shift worked:  days   Shift summary and any significant changes:    PT/OT/SLP consulted with patient  Nephrology came to bedside  Fluids continuing       Concerns for physician to address:     Zone phone for oncoming shift:        Patient Information  Jone Meneses  83 y.o.  1/8/2024  2:40 PM by Jone Melchor MD. 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

## 2024-01-09 NOTE — ED NOTES
Assumed care of pt at this time. Report received from ANAM Christine. Pt resting in bed with daughter at bedside.

## 2024-01-09 NOTE — PLAN OF CARE
Problem: Physical Therapy - Adult  Goal: By Discharge: Performs mobility at highest level of function for planned discharge setting.  See evaluation for individualized goals.  Description: FUNCTIONAL STATUS PRIOR TO ADMISSION: Pt lives with his wife and dtr. Dtr present and very supportive. Pt with hx of Alzheimers but had been able to amb with the rollator with someone walking with him and had help with dressing and bathing, getting into shower once a week with wife assist but dtr states this was very difficult. Dtr states that pt had a sudden decline and has not really been able to amb for 4-5 days.    HOME SUPPORT PRIOR TO ADMISSION: The patient lived with wife and dtr, see above.    Physical Therapy Goals  Initiated 1/9/2024  1.  Patient will move from supine to sit and sit to supine, scoot up and down, and roll side to side in bed with moderate assistance within 7 day(s).    2.  Patient will perform sit to stand with moderate assistance within 7 day(s).  3.  Patient will transfer from bed to chair and chair to bed with moderate assistance using the least restrictive device within 7 day(s).  4.  Patient will ambulate with moderate assistance for 10 feet with the least restrictive device within 7 day(s).       1/9/2024 1243 by Catherine Alcaraz PT  Outcome: Not Progressing     Problem: Occupational Therapy - Adult  Goal: By Discharge: Performs self-care activities at highest level of function for planned discharge setting.  See evaluation for individualized goals.  Description: FUNCTIONAL STATUS PRIOR TO ADMISSION:  Pt has a history of Alzheimers but was ambulating with rollator walker and one assist, wife was getting in the shower with pt 1x wk to assist with bathing, recent difficult with pt stepping over lip of shower, family placed a RTS over toilet but pt was still unable to get up off of commode without assist, pt was able to assist with straightening shirt over body but family otherwise dresses pt,  been able to amb for 4-5 days.    HOME SUPPORT PRIOR TO ADMISSION: The patient lived with wife and dtr, see above.    Physical Therapy Goals  Initiated 1/9/2024  1.  Patient will move from supine to sit and sit to supine, scoot up and down, and roll side to side in bed with moderate assistance within 7 day(s).    2.  Patient will perform sit to stand with moderate assistance within 7 day(s).  3.  Patient will transfer from bed to chair and chair to bed with moderate assistance using the least restrictive device within 7 day(s).  4.  Patient will ambulate with moderate assistance for 10 feet with the least restrictive device within 7 day(s).       1/9/2024 1243 by Catherine Alcaraz PT  Outcome: Not Progressing     Problem: Occupational Therapy - Adult  Goal: By Discharge: Performs self-care activities at highest level of function for planned discharge setting.  See evaluation for individualized goals.  Description: FUNCTIONAL STATUS PRIOR TO ADMISSION:  Pt has a history of Alzheimers but was ambulating with rollator walker and one assist, wife was getting in the shower with pt 1x wk to assist with bathing, recent difficult with pt stepping over lip of shower, family placed a RTS over toilet but pt was still unable to get up off of commode without assist, pt was able to assist with straightening shirt over body but family otherwise dresses pt, pt was able to cut food and self feed with utensils but most recently pt has been finger feeding and unable to use utensils, sudden decline in overall mobility over the past 4-5 days and pt was bed bound just prior to admit    HOME SUPPORT: Patient lived elderly wife and pts daughter moved in with her parents to assist and is very involved in pts care.    Occupational Therapy Goals:  Initiated 1/9/2024  1.  Patient will perform grooming with Stand by Assist within 7 day(s).  2.  Patient will perform self-feeding with Supervision within 7 day(s).  3.  Patient will perform

## 2024-01-09 NOTE — PLAN OF CARE
sources, but direct observation and common sense are also important. However direct testing is not needed.  5. Usually the patient's performance over the preceding 24-48 hours is important, but occasionally longer periods will be relevant.  6. Middle categories imply that the patient supplies over 50 per cent of the effort.  7. Use of aids to be independent is allowed.    Rachid Flores., Barthel, D.W. (1965). Functional evaluation: the Barthel Index. Md State Med J (142.  KALYANI Venegas, KAREN Jefferson., TOYA Watts., DAMION Thomas. (1999). Measuring the change indisability after inpatient rehabilitation; comparison of the responsiveness of the Barthel Index and Functional Dalton Measure. Journal of Neurology, Neurosurgery, and Psychiatry, 66(4), 480-484.  Van CHIN Singh, JUDIE Sutherland, & Anum MSTEVE (2004.) Assessment of post-stroke quality of life in cost-effectiveness studies: The usefulness of the Barthel Index and the EuroQoL-5D. Quality of Life Research, 13, 427-43       Pain Rating:  Reports pain throughout body with mobility attempts, unable to rate and pain appeared to improve over time  Pain Intervention(s):   rest, repositioning, and gentle AAROM, PROM    Activity Tolerance:   Fair  and requires rest breaks    After treatment:   Patient left in no apparent distress in bed, Call bell within reach, Bed/ chair alarm activated, Caregiver / family present, Side rails x3, Heels elevated for pressure relief, and Patient offloaded in partial right side lying for pressure relief with pillow between knees for pressure relief and pillow behind back to maintain sidelying    COMMUNICATION/EDUCATION:   The patient's plan of care was discussed with: physical therapist, registered nurse, and patient's daughter    Patient Education  Education Given To: Patient;Family  Education Provided: Role of Therapy;Plan of Care;ADL Adaptive Strategies;Fall Prevention Strategies;Home Exercise  Program  Education Method: Verbal  Barriers to Learning: Cognition;None  Education Outcome: Verbalized understanding;Continued education needed    Thank you for this referral.  Chana Lyons OTR/L  Minutes: 39    Occupational Therapy Evaluation Charge Determination   History Examination Decision-Making   HIGH Complexity : Extensive review of history including physical, cognitive and psychocial history  HIGH Complexity: 5 Performance deficits relating to physical, cognitive, or psychosocial skills that result in activity limitations and/or participation restrictions  MEDIUM Complexity: Patient may present with comorbidities that affect occupational performance. Minimal to moderate modifications of tasks or assist (eg. physical or verbal) with assist is necessary to enable pt to complete eval   Based on the above components, the patient evaluation is determined to be of the following complexity level: Medium

## 2024-01-10 PROBLEM — R41.89 IMPAIRED COGNITIVE ABILITY: Status: ACTIVE | Noted: 2024-01-10

## 2024-01-10 PROBLEM — Z71.89 COUNSELING REGARDING ADVANCE CARE PLANNING AND GOALS OF CARE: Status: ACTIVE | Noted: 2024-01-10

## 2024-01-10 PROBLEM — Z51.5 PALLIATIVE CARE BY SPECIALIST: Status: ACTIVE | Noted: 2024-01-10

## 2024-01-10 PROBLEM — R45.89 NEED FOR EMOTIONAL SUPPORT: Status: ACTIVE | Noted: 2024-01-10

## 2024-01-10 PROBLEM — R53.81 PHYSICAL DEBILITY: Status: ACTIVE | Noted: 2024-01-10

## 2024-01-10 LAB
25(OH)D3 SERPL-MCNC: 43.7 NG/ML (ref 30–100)
ALBUMIN SERPL-MCNC: 2.7 G/DL (ref 3.5–5)
ANION GAP SERPL CALC-SCNC: 1 MMOL/L (ref 5–15)
BUN SERPL-MCNC: 24 MG/DL (ref 6–20)
BUN/CREAT SERPL: 13 (ref 12–20)
CALCIUM SERPL-MCNC: 8.4 MG/DL (ref 8.5–10.1)
CHLORIDE SERPL-SCNC: 114 MMOL/L (ref 97–108)
CO2 SERPL-SCNC: 25 MMOL/L (ref 21–32)
CREAT SERPL-MCNC: 1.78 MG/DL (ref 0.7–1.3)
ERYTHROCYTE [SEDIMENTATION RATE] IN BLOOD: 29 MM/HR (ref 0–20)
GLUCOSE SERPL-MCNC: 137 MG/DL (ref 65–100)
PHOSPHATE SERPL-MCNC: 2.3 MG/DL (ref 2.6–4.7)
POTASSIUM SERPL-SCNC: 4.3 MMOL/L (ref 3.5–5.1)
SODIUM SERPL-SCNC: 140 MMOL/L (ref 136–145)

## 2024-01-10 PROCEDURE — 6360000002 HC RX W HCPCS: Performed by: GENERAL ACUTE CARE HOSPITAL

## 2024-01-10 PROCEDURE — 2580000003 HC RX 258: Performed by: GENERAL ACUTE CARE HOSPITAL

## 2024-01-10 PROCEDURE — 6370000000 HC RX 637 (ALT 250 FOR IP): Performed by: INTERNAL MEDICINE

## 2024-01-10 PROCEDURE — 1100000003 HC PRIVATE W/ TELEMETRY

## 2024-01-10 PROCEDURE — 99221 1ST HOSP IP/OBS SF/LOW 40: CPT | Performed by: INTERNAL MEDICINE

## 2024-01-10 PROCEDURE — 80069 RENAL FUNCTION PANEL: CPT

## 2024-01-10 PROCEDURE — 2500000003 HC RX 250 WO HCPCS: Performed by: GENERAL ACUTE CARE HOSPITAL

## 2024-01-10 PROCEDURE — 36415 COLL VENOUS BLD VENIPUNCTURE: CPT

## 2024-01-10 PROCEDURE — 6370000000 HC RX 637 (ALT 250 FOR IP): Performed by: GENERAL ACUTE CARE HOSPITAL

## 2024-01-10 PROCEDURE — 99222 1ST HOSP IP/OBS MODERATE 55: CPT | Performed by: FAMILY MEDICINE

## 2024-01-10 PROCEDURE — 85652 RBC SED RATE AUTOMATED: CPT

## 2024-01-10 RX ORDER — SALSALATE 500 MG
500 TABLET ORAL 2 TIMES DAILY
Status: DISCONTINUED | OUTPATIENT
Start: 2024-01-10 | End: 2024-01-10

## 2024-01-10 RX ORDER — SALSALATE 500 MG
500 TABLET ORAL 2 TIMES DAILY
Status: DISCONTINUED | OUTPATIENT
Start: 2024-01-10 | End: 2024-01-19 | Stop reason: HOSPADM

## 2024-01-10 RX ORDER — ENOXAPARIN SODIUM 100 MG/ML
40 INJECTION SUBCUTANEOUS DAILY
Status: DISCONTINUED | OUTPATIENT
Start: 2024-01-11 | End: 2024-01-19 | Stop reason: HOSPADM

## 2024-01-10 RX ADMIN — PREDNISONE 10 MG: 5 TABLET ORAL at 09:56

## 2024-01-10 RX ADMIN — ASPIRIN 81 MG: 81 TABLET, COATED ORAL at 09:56

## 2024-01-10 RX ADMIN — MEMANTINE 10 MG: 10 TABLET ORAL at 20:06

## 2024-01-10 RX ADMIN — ACETAMINOPHEN 1000 MG: 500 TABLET ORAL at 15:02

## 2024-01-10 RX ADMIN — Medication 500 MG: at 20:08

## 2024-01-10 RX ADMIN — SODIUM CHLORIDE, PRESERVATIVE FREE 10 ML: 5 INJECTION INTRAVENOUS at 09:56

## 2024-01-10 RX ADMIN — HYDROCORTISONE: 0.01 CREAM TOPICAL at 09:56

## 2024-01-10 RX ADMIN — Medication 500 MG: at 09:59

## 2024-01-10 RX ADMIN — ACETAMINOPHEN 1000 MG: 500 TABLET ORAL at 05:54

## 2024-01-10 RX ADMIN — Medication 500 MG: at 01:50

## 2024-01-10 RX ADMIN — POTASSIUM CHLORIDE, DEXTROSE MONOHYDRATE AND SODIUM CHLORIDE: 150; 5; 450 INJECTION, SOLUTION INTRAVENOUS at 12:05

## 2024-01-10 RX ADMIN — FLUOXETINE 10 MG: 10 CAPSULE ORAL at 09:56

## 2024-01-10 RX ADMIN — ENOXAPARIN SODIUM 30 MG: 100 INJECTION SUBCUTANEOUS at 09:56

## 2024-01-10 RX ADMIN — SODIUM CHLORIDE, PRESERVATIVE FREE 10 ML: 5 INJECTION INTRAVENOUS at 20:11

## 2024-01-10 RX ADMIN — PRAVASTATIN SODIUM 80 MG: 40 TABLET ORAL at 20:06

## 2024-01-10 RX ADMIN — ACETAMINOPHEN 1000 MG: 500 TABLET ORAL at 23:12

## 2024-01-10 RX ADMIN — HYDROCORTISONE: 0.01 CREAM TOPICAL at 20:07

## 2024-01-10 RX ADMIN — POTASSIUM CHLORIDE, DEXTROSE MONOHYDRATE AND SODIUM CHLORIDE: 150; 5; 450 INJECTION, SOLUTION INTRAVENOUS at 00:04

## 2024-01-10 RX ADMIN — PREDNISONE 10 MG: 5 TABLET ORAL at 20:06

## 2024-01-10 RX ADMIN — MEMANTINE 10 MG: 10 TABLET ORAL at 09:56

## 2024-01-10 RX ADMIN — FINASTERIDE 5 MG: 5 TABLET, FILM COATED ORAL at 09:57

## 2024-01-10 RX ADMIN — TAMSULOSIN HYDROCHLORIDE 0.4 MG: 0.4 CAPSULE ORAL at 09:56

## 2024-01-10 ASSESSMENT — PAIN DESCRIPTION - LOCATION
LOCATION: LEG

## 2024-01-10 ASSESSMENT — PAIN DESCRIPTION - ORIENTATION
ORIENTATION: LEFT

## 2024-01-10 ASSESSMENT — PAIN SCALES - GENERAL
PAINLEVEL_OUTOF10: 5
PAINLEVEL_OUTOF10: 4
PAINLEVEL_OUTOF10: 3

## 2024-01-10 ASSESSMENT — PAIN DESCRIPTION - DESCRIPTORS
DESCRIPTORS: ACHING
DESCRIPTORS: ACHING

## 2024-01-10 NOTE — PROGRESS NOTES
01/10/24        I have been asked to see this patient by Jeovany Barrientos MD  for advice/opinion re: -----CKD                                                        Assessment:         CKD stage IIIb-creatinine 2.14 at admission time  Polycystic kidneys  SANDRO on CKD possible-yet to be a ascertained  Prior history of BPH  Vascular dementia-  CAD s/p CABG  S/p second-degree heart block and pacemaker placement Discussion:       Creatinine during hospitalization has been trending 2.1-2.3 since 2022  Creatinine at 1.78.  Yesterday it was 2.11.  Using manwick.  Urine output 700 mL  Not sure if he has polycystic kidney disease versus multiple cysts in the kidneys  Spoke to daughter about possibility of him having polycystic kidney disease and the genetic risks to offsprings.  They would need t further assessment which can be done outpatient  Renal ultrasound in the past showed polycystic kidneys  CT scan abdomen pelvis shows cholecystolithiasis shows cholecystolithiasis with mobility dilation and multiple renal cysts including simple and hyperdense  CT scan head showed  cerebral atrophy and chronic microvascular changes    Plan:   Attempt to wean off IV fluids today  A.m. renal function                   Thanks for consulting me. Renal service will follow patient with you.Please don't hesitate to contact me if any questions arise of if I can assist in any manner.      Ajay Rai MD  Cell no- [phone]  Available on perfect serve.          Signed By: Ajay Rai MD     January 10, 2024           Consult Date: 1/10/2024          Subjective   Seen and examined.  Resting in bed.  Accompanied by daughter.  Oral intake is limited but is encouraged by family members.  Remains confused but cooperative for most parts    PMH:  Past Medical History:   Diagnosis Date    Arthritis

## 2024-01-10 NOTE — PLAN OF CARE
Every 4-6 hours minimum:  Change oxygen saturation probe site  4.  Every 4-6 hours:  If on nasal continuous positive airway pressure, respiratory therapy assess nares and determine need for appliance change or resting period.  1/9/2024 2313 by Olesya Weaver LPN  Outcome: Progressing  1/9/2024 1722 by Ania Ferguson RN  Outcome: Progressing     Problem: ABCDS Injury Assessment  Goal: Absence of physical injury  1/9/2024 2313 by Olesya Weaver LPN  Outcome: Progressing  1/9/2024 1722 by Ania Ferguson RN  Outcome: Progressing     Problem: Neurosensory - Adult  Goal: Achieves stable or improved neurological status  1/9/2024 2313 by Olesya Weaver LPN  Outcome: Progressing  1/9/2024 1722 by Ania Ferguson RN  Outcome: Progressing  Goal: Achieves maximal functionality and self care  1/9/2024 2313 by Olesya Weaver LPN  Outcome: Progressing  1/9/2024 1722 by Ania Ferguson RN  Outcome: Progressing     Problem: Cardiovascular - Adult  Goal: Maintains optimal cardiac output and hemodynamic stability  1/9/2024 2313 by Olesya Weaver LPN  Outcome: Progressing  1/9/2024 1722 by Ania Ferguson RN  Outcome: Progressing  Goal: Absence of cardiac dysrhythmias or at baseline  1/9/2024 2313 by Olesya Weaver LPN  Outcome: Progressing  1/9/2024 1722 by Ania Ferguson RN  Outcome: Progressing     Problem: Skin/Tissue Integrity - Adult  Goal: Skin integrity remains intact  1/9/2024 2313 by Olesya Weaver LPN  Outcome: Progressing  1/9/2024 1722 by Ania Ferguson RN  Outcome: Progressing  Goal: Incisions, wounds, or drain sites healing without S/S of infection  1/9/2024 2313 by Olesya Weaver LPN  Outcome: Progressing  1/9/2024 1722 by Ania Ferguson RN  Outcome: Progressing  Goal: Oral mucous membranes remain intact  1/9/2024 2313 by Olesya Weaver LPN  Outcome: Progressing  1/9/2024 1722 by Ania Ferguson RN  Outcome: Progressing     Problem: Musculoskeletal - Adult  Goal: Return mobility to safest level of  mobility over the past 4-5 days and pt was bed bound just prior to admit    HOME SUPPORT: Patient lived elderly wife and pts daughter moved in with her parents to assist and is very involved in pts care.    Occupational Therapy Goals:  Initiated 1/9/2024  1.  Patient will perform grooming with Stand by Assist within 7 day(s).  2.  Patient will perform self-feeding with Supervision within 7 day(s).  3.  Patient will perform upper body dressing with Moderate Assist within 7 day(s).  4.  Patient will perform sit to stand with moderate assist in prep for ADL transfers within 7 day(s).  5.  Patient will perform upper body bathing with SBA seated with good balance within 7 day(s).        1/9/2024 1338 by Chana Lyons, OTR/L  Outcome: Not Progressing     Problem: SLP Adult - Impaired Swallowing  Goal: By Discharge: Advance to least restrictive diet without signs or symptoms of aspiration for planned discharge setting.  See evaluation for individualized goals.  Description: Speech Pathology Goal:   Initiated 1/9/2024    1.  Pt will tolerate least restrictive diet without clinical s/s aspiration or respiratory decline within 7 days.    1/9/2024 1356 by Katlyn Calhoun, SLP  Outcome: Not Progressing

## 2024-01-10 NOTE — PROGRESS NOTES
End of Shift Note    Bedside shift change report given to ANAM Jorge (oncoming nurse) by Olesya Weaver LPN (offgoing nurse).  Report included the following information Nurse Handoff Report, MAR, and Recent Results      Shift worked:  Nights   Shift summary and any significant changes:    Turn Q2hr, Labs done, New order Tylenol Every 8hr and Prednisone 2x daily. Family brought in Salsalate was sent to pharmacy to be labeled and a new order for 2x daily.       Concerns for physician to address:  See above notes   Zone phone for oncoming shift:   [phone]     Patient Information  Jone Meneses  83 y.o.  1/8/2024  2:40 PM by Jone Melchor MD. 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    Cardiomyopathy, ischemic 05/24/2015    Dyslipidemia 11/12/2014    Renal azotemia 11/12/2014    Prostatism 11/12/2014     HTN (hypertension) 11/12/2014    Ankylosing spondylitis (HCC) 11/12/2014     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, type 1    CAD (coronary artery disease)     Chronic kidney disease     polycystic kidney disease       Core Measures:  CVA: No No  CHF:No No  PNA:NoNo    Activity:     Number times ambulated in hallways past shift: 0  Number of times OOB to chair past shift: 0    Cardiac:   Cardiac Monitoring: Yes      Cardiac Rhythm: AV paced    Access:   Current line(s): PIV  Central Line? No Placement date  Reason Medically Necessary   PICC LINE? No Placement date Reason Medically Necessary     Genitourinary:   Urinary status: incontinent  Urinary Catheter? No Placement Date  Reason Medically Necessary     Respiratory:   O2 Device: None (Room air)  Chronic home O2 use?: no  Incentive spirometer at bedside: no       GI:     Current diet:  ADULT DIET; Easy to Chew  Passing flatus: yes  Tolerating current diet: yes       Pain Management:   Patient states pain is manageable on current regimen: yes    Skin:  Cooper Scale Score: 14  Interventions: float heels, increase time out of bed, PT/OT consult, and internal/external urinary devices    Patient Safety:  Fall Score: Randhawa Total Score: 80  Interventions: bed/chair alarm, assistive devices (walker, cane, etc.), gripper socks, pt to call before getting OOB, and stay with me (per policy)     @Rollbelt  @dexterity to release roll belt  Yes/No ( must document dexterity  here by stating Yes or No here, otherwise this is a restraint and must follow restraint documentation policy.)    DVT prophylaxis:  DVT prophylaxis Med- yes  DVT prophylaxis SCD or LUCRETIA- no     Wounds: (If Applicable)  Wounds- no  Location     Active Consults:  IP CONSULT TO PALLIATIVE CARE  IP CONSULT TO NEPHROLOGY    Length of Stay:  Expected LOS: 5  Actual LOS: 2  Discharge Plan: yes ERNIE Weaver

## 2024-01-10 NOTE — CONSULTS
Palliative Medicine  Patient Name: Jone Meneses  YOB: 1940  MRN: [id number]  Age: 83 y.o.  Gender: male    Date of Initial Consult: 1/10/2024  Date of Service: 1/10/2024  Time: 12:52 PM  Provider: Lia Campo MD  Hospital Day: 3  Admit Date: 1/8/2024  Referring Provider: Dr. Laurent       Reasons for Consultation:  Goals of Care    HISTORY OF PRESENT ILLNESS (HPI):   Jone Meneses is a 83 y.o. male with Dementia, HTN, CAD s/p CABG, second-degree heart block s/p PPM, CKD 3, BPH, arthritis who was admitted on 1/8/2024 from home with a diagnosis of fever, FTT, ambulatory dysfunction and worsening dementia. Workup has not revealed an infectious cause. He has been initiated on a course of prednisone for joint pain. PT/OT has recommended SNF. SLP has also evaluated and has recommended easy to chew and thin liquids diet. Nephrology is following for CKD and cysts of kidneys. Flomax and finasteride have been ordered.    1/8/2024 CT Head:  IMPRESSION:  No acute intracranial process.  Imaging findings consistent with moderate chronic microvascular ischemic change.  There is a moderate degree of cerebral atrophy.    Psychosocial: Patient lives with his wife Linda and daughter Don, who moved in with them relatively recently. Patient and his wife have three children, Frank (lives in [place] Owen Ochoa (lives near [place]). Patient spends most of his day sleeping. When family tries to get him up, he tells them he is not hungry and does not want to get up. He eats about one meal per day. He can walk on some days with a RW for a few laps in the house. His daughter tries to help him with floor pedal exercises, but he hasn't really done this in a couple weeks. He has had two falls in the past year. He is retired from working with the [employer]. 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    PALLIATIVE DIAGNOSES:    Encounter for  Palliative Care  Goals of Care discussion  Advance Care Plan discussion  Impaired cognitive ability  Physical Debility, Generalized weakness  Need for support    ASSESSMENT AND PLAN:   Chart reviewed as summarized above.   I met with patient and daughter Don this morning. Patient tried to answer a couple basic questions but could not really formulate clear responses. He does not have medical decision making capacity at this time due to dementia.  The SH above is per daughter Don.  Don thinks her dad may have an AMD naming his wife Linda as mPOA. Don says her mom should be coming to the hospital this afternoon as they take turns staying with him.  I will return to talk to patient's wife Linda further about patient's medical care and GOC. I did briefly introduce code status to Don as a topic for the family to discuss and consider together.  Wife was not present and patient was sleeping when I returned. I will try to connect with patient's wife again tomorrow.  Thank you for asking our team to participate in the care of Jone Meneses.  Please call with any palliative questions or concerns.  Palliative Care Team is available via perfect serve or via phone.    Referrals to:   [] Outpatient Palliative Care  [] Home Based Palliative Care  [] Home Based Primary Care  [] Hospice       ADVANCE CARE PLANNING:   [] [facility] Interdisciplinary Team has updated the ACP Navigator with Health Care Decision Maker and Patient Capacity      Primary Decision Maker: Linda Meneses - Spouse - [phone]       Current Code Status: Full Code     Goals of Care:         Please refer to Palliative Medicine ACP notes for further details.    PALLIATIVE ASSESSMENT:      Palliative Performance Scale (PPS):  PPS: 40    ECOG:   ECOG Status : Completely disabled [4]    Modified ESAS:  Modified-[place] Symptom Assessment Scale (ESAS)  Drowsiness Score: Not drowsy  Pain Score: No pain  Anxiety Score: Not

## 2024-01-10 NOTE — PROGRESS NOTES
General Daily Progress Note    Admit Date: 1/8/2024    Subjective:     Patient complains of joint pain    Current Facility-Administered Medications   Medication Dose Route Frequency    hydrocortisone 1 % cream   Topical BID    finasteride (PROSCAR) tablet 5 mg  5 mg Oral Daily    tamsulosin (FLOMAX) capsule 0.4 mg  0.4 mg Oral Daily    predniSONE (DELTASONE) tablet 10 mg  10 mg Oral BID    acetaminophen (TYLENOL) tablet 1,000 mg  1,000 mg Oral q8h    aspirin EC tablet 81 mg  81 mg Oral Daily    FLUoxetine (PROZAC) capsule 10 mg  10 mg Oral Daily    memantine (NAMENDA) tablet 10 mg  10 mg Oral BID    pravastatin (PRAVACHOL) tablet 80 mg  80 mg Oral QHS    [Held by provider] salsalate (DISALCID) tablet 500 mg  500 mg Oral BID    sodium chloride flush 0.9 % injection 5-40 mL  5-40 mL IntraVENous 2 times per day    sodium chloride flush 0.9 % injection 5-40 mL  5-40 mL IntraVENous PRN    0.9 % sodium chloride infusion   IntraVENous PRN    enoxaparin Sodium (LOVENOX) injection 30 mg  30 mg SubCUTAneous Daily    ondansetron (ZOFRAN-ODT) disintegrating tablet 4 mg  4 mg Oral Q8H PRN    Or    ondansetron (ZOFRAN) injection 4 mg  4 mg IntraVENous Q6H PRN    polyethylene glycol (GLYCOLAX) packet 17 g  17 g Oral Daily PRN    dextrose 5 % and 0.45 % NaCl with KCl 20 mEq infusion   IntraVENous Continuous        Review of Systems  Review of systems not obtained due to patient factors.    Objective:     Patient Vitals for the past 24 hrs:   BP Temp Temp src Pulse Resp SpO2 Height Weight   01/09/24 1936 (!) 134/93 98.4 °F (36.9 °C) Oral 78 20 97 % -- --   01/09/24 1440 108/64 98.6 °F (37 °C) Oral 87 14 99 % -- --   01/09/24 1130 111/79 -- -- 78 -- 98 % -- --   01/09/24 0945 132/80 98.6 °F (37 °C) Oral 73 16 100 % -- --   01/09/24 0730 -- -- -- 73 16 100 % -- --   01/09/24 0700 137/80 98 °F (36.7 °C) Oral 70 13 100 % -- --   01/09/24 0600 (!) 143/85 98.8 °F (37.1 °C) Oral 75 15 100 % 1.829 m (6') 80.3 kg (177 lb 0.5 oz)   01/09/24

## 2024-01-10 NOTE — CARE COORDINATION
Care Management Initial Assessment       RUR: 18% (moderate RUR)  Readmission? No  1st IM letter given? Yes - Pt access  1st  letter given: No    CM met with pt and DTR at bedside. Pt sleeping at this time, eval deferred to DTR. CM introduced self and role and completed initial assessment. CM verified demographic and clinical information.     Pt lives with wife and DTR's family in a 1 level home, 4 BOB. DTR reports they are having a lift put in place today. Pt DTR reports being dependent in some ADLs. Pt DTR reports rollator and life at home. Pt denies O2/CPAP at home. Pt DTR states pt is supported by their family. Patient is not an active . Pt denies history of HH/IPR/SNF.     CM discussed PT/OT rec for SNF. DTR is agreeable to SNF, stating they are not ready to consider hospice at this time. DTR appreciated visit from palliative. CM provided SNF FOC list and provided information about SNF placements.     CM has requested 4-5 choices from family and will follow-up this afternoon. DTR to discuss with pt's wife.        01/10/24 1211   Service Assessment   Patient Orientation Unable to Assess;Other (see comment)  (Pt sleeping at time of assessment)   Cognition Other (see comment)  (Pt sleeping at time of assessment)   History Provided By Child/Family   Primary Caregiver Self   Accompanied By/Relationship DTR; Eimliana   Support Systems Spouse/Significant Other;Children;Family Members   Patient's Healthcare Decision Maker is: Legal Next of Kin   PCP Verified by CM Yes   Last Visit to PCP Within last 3 months   Prior Functional Level Assistance with the following:;Bathing;Shopping;Housework;Cooking   Current Functional Level Assistance with the following:;Bathing;Dressing;Cooking;Housework;Shopping   Can patient return to prior living arrangement Yes   Ability to make needs known: Fair   Family able to assist with home care needs: Yes   Would you like for me to discuss the discharge plan with any other      General Advance Care Planning (ACP) Conversation    Date of Conversation: 1/8/2024  Conducted with: Patient with Decision Making Capacity    Healthcare Decision Maker:    Primary Decision Maker: Linda Meneses - Spouse - [phone]  Click here to complete Healthcare Decision Makers including selection of the Healthcare Decision Maker Relationship (ie \"Primary\").   Today we documented Decision Maker(s) consistent with Legal Next of Kin hierarchy.    Content/Action Overview:  Has NO ACP documents/care preferences - refer to ACP Clinical Specialist  Reviewed DNR/DNI and patient elects Full Code (Attempt Resuscitation)    Length of Voluntary ACP Conversation in minutes:  <16 minutes (Non-Billable)    CORI Pablo

## 2024-01-11 LAB
ALBUMIN SERPL-MCNC: 2.8 G/DL (ref 3.5–5)
ANION GAP SERPL CALC-SCNC: 1 MMOL/L (ref 5–15)
BUN SERPL-MCNC: 23 MG/DL (ref 6–20)
BUN/CREAT SERPL: 14 (ref 12–20)
CALCIUM SERPL-MCNC: 8.6 MG/DL (ref 8.5–10.1)
CHLORIDE SERPL-SCNC: 113 MMOL/L (ref 97–108)
CO2 SERPL-SCNC: 25 MMOL/L (ref 21–32)
CREAT SERPL-MCNC: 1.69 MG/DL (ref 0.7–1.3)
GLUCOSE SERPL-MCNC: 111 MG/DL (ref 65–100)
PHOSPHATE SERPL-MCNC: 2 MG/DL (ref 2.6–4.7)
POTASSIUM SERPL-SCNC: 4.7 MMOL/L (ref 3.5–5.1)
SODIUM SERPL-SCNC: 139 MMOL/L (ref 136–145)

## 2024-01-11 PROCEDURE — 6370000000 HC RX 637 (ALT 250 FOR IP): Performed by: INTERNAL MEDICINE

## 2024-01-11 PROCEDURE — 97530 THERAPEUTIC ACTIVITIES: CPT

## 2024-01-11 PROCEDURE — 2580000003 HC RX 258: Performed by: GENERAL ACUTE CARE HOSPITAL

## 2024-01-11 PROCEDURE — 92526 ORAL FUNCTION THERAPY: CPT | Performed by: SPEECH-LANGUAGE PATHOLOGIST

## 2024-01-11 PROCEDURE — 80069 RENAL FUNCTION PANEL: CPT

## 2024-01-11 PROCEDURE — 36415 COLL VENOUS BLD VENIPUNCTURE: CPT

## 2024-01-11 PROCEDURE — 99221 1ST HOSP IP/OBS SF/LOW 40: CPT | Performed by: INTERNAL MEDICINE

## 2024-01-11 PROCEDURE — 97535 SELF CARE MNGMENT TRAINING: CPT

## 2024-01-11 PROCEDURE — 6370000000 HC RX 637 (ALT 250 FOR IP): Performed by: GENERAL ACUTE CARE HOSPITAL

## 2024-01-11 PROCEDURE — 1100000003 HC PRIVATE W/ TELEMETRY

## 2024-01-11 PROCEDURE — 99232 SBSQ HOSP IP/OBS MODERATE 35: CPT | Performed by: FAMILY MEDICINE

## 2024-01-11 PROCEDURE — 6360000002 HC RX W HCPCS: Performed by: INTERNAL MEDICINE

## 2024-01-11 RX ADMIN — POLYETHYLENE GLYCOL 3350 17 G: 17 POWDER, FOR SOLUTION ORAL at 16:55

## 2024-01-11 RX ADMIN — TAMSULOSIN HYDROCHLORIDE 0.4 MG: 0.4 CAPSULE ORAL at 10:05

## 2024-01-11 RX ADMIN — PRAVASTATIN SODIUM 80 MG: 40 TABLET ORAL at 21:37

## 2024-01-11 RX ADMIN — FLUOXETINE 10 MG: 10 CAPSULE ORAL at 10:05

## 2024-01-11 RX ADMIN — Medication 500 MG: at 10:06

## 2024-01-11 RX ADMIN — ACETAMINOPHEN 1000 MG: 500 TABLET ORAL at 06:09

## 2024-01-11 RX ADMIN — ASPIRIN 81 MG: 81 TABLET, COATED ORAL at 10:05

## 2024-01-11 RX ADMIN — ACETAMINOPHEN 1000 MG: 500 TABLET ORAL at 16:54

## 2024-01-11 RX ADMIN — MEMANTINE 10 MG: 10 TABLET ORAL at 11:36

## 2024-01-11 RX ADMIN — PREDNISONE 10 MG: 5 TABLET ORAL at 21:37

## 2024-01-11 RX ADMIN — FINASTERIDE 5 MG: 5 TABLET, FILM COATED ORAL at 10:05

## 2024-01-11 RX ADMIN — PREDNISONE 10 MG: 5 TABLET ORAL at 10:05

## 2024-01-11 RX ADMIN — ACETAMINOPHEN 1000 MG: 500 TABLET ORAL at 23:23

## 2024-01-11 RX ADMIN — HYDROCORTISONE: 0.01 CREAM TOPICAL at 21:40

## 2024-01-11 RX ADMIN — HYDROCORTISONE: 0.01 CREAM TOPICAL at 10:06

## 2024-01-11 RX ADMIN — SODIUM CHLORIDE, PRESERVATIVE FREE 10 ML: 5 INJECTION INTRAVENOUS at 21:41

## 2024-01-11 RX ADMIN — MEMANTINE 10 MG: 10 TABLET ORAL at 21:37

## 2024-01-11 RX ADMIN — SODIUM CHLORIDE, PRESERVATIVE FREE 10 ML: 5 INJECTION INTRAVENOUS at 10:07

## 2024-01-11 RX ADMIN — ENOXAPARIN SODIUM 40 MG: 100 INJECTION SUBCUTANEOUS at 10:06

## 2024-01-11 RX ADMIN — Medication 500 MG: at 21:54

## 2024-01-11 ASSESSMENT — PAIN SCALES - GENERAL
PAINLEVEL_OUTOF10: 3
PAINLEVEL_OUTOF10: 4
PAINLEVEL_OUTOF10: 5
PAINLEVEL_OUTOF10: 0
PAINLEVEL_OUTOF10: 2
PAINLEVEL_OUTOF10: 2

## 2024-01-11 ASSESSMENT — PAIN DESCRIPTION - DESCRIPTORS: DESCRIPTORS: ACHING

## 2024-01-11 ASSESSMENT — PAIN DESCRIPTION - LOCATION: LOCATION: LEG

## 2024-01-11 NOTE — PLAN OF CARE
Problem: Occupational Therapy - Adult  Goal: By Discharge: Performs self-care activities at highest level of function for planned discharge setting.  See evaluation for individualized goals.  Description: FUNCTIONAL STATUS PRIOR TO ADMISSION:  Pt has a history of Alzheimers but was ambulating with rollator walker and one assist, wife was getting in the shower with pt 1x wk to assist with bathing, recent difficult with pt stepping over lip of shower, family placed a RTS over toilet but pt was still unable to get up off of commode without assist, pt was able to assist with straightening shirt over body but family otherwise dresses pt, pt was able to cut food and self feed with utensils but most recently pt has been finger feeding and unable to use utensils, sudden decline in overall mobility over the past 4-5 days and pt was bed bound just prior to admit    HOME SUPPORT: Patient lived elderly wife and pts daughter moved in with her parents to assist and is very involved in pts care.    Occupational Therapy Goals:  Initiated 1/9/2024  1.  Patient will perform grooming with Stand by Assist within 7 day(s).  2.  Patient will perform self-feeding with Supervision within 7 day(s).  3.  Patient will perform upper body dressing with Moderate Assist within 7 day(s).  4.  Patient will perform sit to stand with moderate assist in prep for ADL transfers within 7 day(s).  5.  Patient will perform upper body bathing with SBA seated with good balance within 7 day(s).        Outcome: Progressing   OCCUPATIONAL THERAPY TREATMENT  Patient: Jone Meneses (83 y.o. male)  Date: 1/11/2024  Primary Diagnosis: Adult failure to thrive [R62.7]  Sepsis (HCC) [A41.9]  Ambulatory dysfunction [R26.2]       Precautions: Fall Risk, Bed Alarm                Chart, occupational therapy assessment, plan of care, and goals were reviewed.    ASSESSMENT  Patient continues to benefit from skilled OT services and is slowly progressing towards goals.

## 2024-01-11 NOTE — PROGRESS NOTES
End of Shift Note    Bedside shift change report given to ANAM Hare (oncoming nurse) by Olesya Weaver LPN (offgoing nurse).  Report included the following information Nurse Handoff Report, MAR, and Recent Results      Shift worked:  Nights   Shift summary and any significant changes:    Turn Q2hr, Labs done   Concerns for physician to address:  See above notes   Zone phone for oncoming shift:   [phone]     Patient Information  Jone Meneses  83 y.o.  1/8/2024  2:40 PM by Jone Melchor MD. Jone Meneses was admitted from Home    Problem List  Patient Active Problem List    Diagnosis Date Noted    Palliative care by specialist 01/10/2024    Counseling regarding advance care planning and goals of care 01/10/2024    Need for emotional support 01/10/2024    Impaired cognitive ability 01/10/2024    Physical debility 01/10/2024    Polyarthralgia 01/09/2024    Calculus of gallbladder with biliary obstruction but without cholecystitis 11/04/2023    Primary osteoarthritis of right knee 08/19/2023    Venous insufficiency 04/23/2023    Reactive depression 12/29/2022    Physical deconditioning 12/09/2021    Neurogenic bladder 08/08/2021    Cerebral microvascular disease 05/28/2020    Ataxia 04/09/2020    Vitamin D deficiency 04/09/2020    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    Cardiomyopathy, ischemic 05/24/2015  Gen: + fatigue, wt Loss  Skin: No rashes  Head/Eyes/Ears/Mouth: No headache; Normal hearing; Normal vision w/o blurriness; No sinus pain/discomfort, sore throat  Respiratory: No dyspnea, cough, wheezing, hemoptysis  CV: No chest pain, PND, orthopnea  GI: C/O mild abdominal pain at surgical site, diarrhea, constipation, nausea, vomiting, melena, hematochezia  : No increased frequency, dysuria, hematuria, nocturia  MSK: No joint pain/swelling; no back pain; no edema  Neuro: No dizziness/lightheadedness, weakness, seizures, numbness, tingling  Heme: No easy bruising or bleeding  Endo: No heat/cold intolerance  Psych: No significant nervousness, anxiety, stress, depression  All other systems were reviewed and are negative, except as noted. Gen: + fatigue, wt Loss  Skin: No rashes  Head/Eyes/Ears/Mouth: No headache; Normal hearing; Normal vision w/o blurriness; No sinus pain/discomfort, sore throat  Respiratory: No dyspnea, cough, wheezing, hemoptysis  CV: No chest pain, PND, orthopnea  GI: no abdominal pain  : No increased frequency, dysuria, hematuria, nocturia  MSK: No joint pain/swelling; no back pain; no edema  Neuro: No dizziness/lightheadedness, weakness, seizures, numbness, tingling  Heme: No easy bruising or bleeding  Endo: No heat/cold intolerance  Psych: No significant nervousness, anxiety, stress, depression  All other systems were reviewed and are negative, except as noted.

## 2024-01-11 NOTE — PROGRESS NOTES
General Daily Progress Note    Admit Date: 1/8/2024    Subjective:     Patient complains of joint pain    Current Facility-Administered Medications   Medication Dose Route Frequency    salsalate (DISALCID) tablet 500 mg  500 mg Oral BID    [START ON 1/11/2024] enoxaparin (LOVENOX) injection 40 mg  40 mg SubCUTAneous Daily    hydrocortisone 1 % cream   Topical BID    finasteride (PROSCAR) tablet 5 mg  5 mg Oral Daily    tamsulosin (FLOMAX) capsule 0.4 mg  0.4 mg Oral Daily    predniSONE (DELTASONE) tablet 10 mg  10 mg Oral BID    acetaminophen (TYLENOL) tablet 1,000 mg  1,000 mg Oral q8h    aspirin EC tablet 81 mg  81 mg Oral Daily    FLUoxetine (PROZAC) capsule 10 mg  10 mg Oral Daily    memantine (NAMENDA) tablet 10 mg  10 mg Oral BID    pravastatin (PRAVACHOL) tablet 80 mg  80 mg Oral QHS    sodium chloride flush 0.9 % injection 5-40 mL  5-40 mL IntraVENous 2 times per day    sodium chloride flush 0.9 % injection 5-40 mL  5-40 mL IntraVENous PRN    0.9 % sodium chloride infusion   IntraVENous PRN    ondansetron (ZOFRAN-ODT) disintegrating tablet 4 mg  4 mg Oral Q8H PRN    Or    ondansetron (ZOFRAN) injection 4 mg  4 mg IntraVENous Q6H PRN    polyethylene glycol (GLYCOLAX) packet 17 g  17 g Oral Daily PRN    dextrose 5 % and 0.45 % NaCl with KCl 20 mEq infusion   IntraVENous Continuous        Review of Systems  Review of systems not obtained due to patient factors.    Objective:     Patient Vitals for the past 24 hrs:   BP Temp Temp src Pulse Resp SpO2   01/10/24 1922 130/79 -- -- 79 18 100 %   01/10/24 1535 (!) 150/77 98.1 °F (36.7 °C) -- 72 16 99 %   01/10/24 1235 105/73 97.5 °F (36.4 °C) Oral 75 16 --   01/10/24 1234 -- -- Oral -- -- --   01/10/24 1232 -- -- Oral -- -- --   01/10/24 0834 -- -- Oral -- -- --   01/10/24 0309 105/73 97.5 °F (36.4 °C) Oral 75 16 --   01/09/24 2337 100/72 98.1 °F (36.7 °C) Oral 96 18 99 %       No intake/output data recorded.  01/09 0701 - 01/10 1900  In: 1198.9 [I.V.:1198.9]  Out:

## 2024-01-11 NOTE — PROGRESS NOTES
End of Shift Note     Bedside shift change report given to FLORES Dacosta (oncoming nurse) by ANAM Stone (offgoing nurse).  Report included the following information Nurse Handoff Report, MAR, and Recent Results        Shift worked:  days   Shift summary and any significant changes:     Nephro came bedside  Palliative came to bedside.  CM following for DC needs      Concerns for physician to address:  See above notes   Zone phone for oncoming shift:   [phone]      Patient Information  Jone Meneses  83 y.o.  1/8/2024  2:40 PM by Jone Melchor MD. 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) 10/29/2019    ASHD (arteriosclerotic heart disease) 09/29/2018    Memory deficit 09/24/2018    Dizziness 05/22/2018    Cardiomyopathy, ischemic 05/24/2015    Dyslipidemia 11/12/2014    Renal azotemia 11/12/2014    Prostatism 11/12/2014    HTN (hypertension) 11/12/2014    Ankylosing spondylitis (HCC) 11/12/2014      Past

## 2024-01-11 NOTE — PLAN OF CARE
Problem: Discharge Planning  Goal: Discharge to home or other facility with appropriate resources  Outcome: Progressing     Problem: Safety - Adult  Goal: Free from fall injury  Outcome: Progressing     Problem: Skin/Tissue Integrity  Goal: Absence of new skin breakdown  Description: 1.  Monitor for areas of redness and/or skin breakdown  2.  Assess vascular access sites hourly  3.  Every 4-6 hours minimum:  Change oxygen saturation probe site  4.  Every 4-6 hours:  If on nasal continuous positive airway pressure, respiratory therapy assess nares and determine need for appliance change or resting period.  Outcome: Progressing     Problem: ABCDS Injury Assessment  Goal: Absence of physical injury  Outcome: Progressing     Problem: Neurosensory - Adult  Goal: Achieves stable or improved neurological status  Outcome: Progressing  Goal: Achieves maximal functionality and self care  Outcome: Progressing     Problem: Cardiovascular - Adult  Goal: Maintains optimal cardiac output and hemodynamic stability  Outcome: Progressing  Goal: Absence of cardiac dysrhythmias or at baseline  Outcome: Progressing     Problem: Skin/Tissue Integrity - Adult  Goal: Skin integrity remains intact  Outcome: Progressing  Goal: Incisions, wounds, or drain sites healing without S/S of infection  Outcome: Progressing  Goal: Oral mucous membranes remain intact  Outcome: Progressing     Problem: Musculoskeletal - Adult  Goal: Return mobility to safest level of function  Outcome: Progressing  Goal: Maintain proper alignment of affected body part  Outcome: Progressing  Goal: Return ADL status to a safe level of function  Outcome: Progressing     Problem: Infection - Adult  Goal: Absence of infection at discharge  Outcome: Progressing  Goal: Absence of infection during hospitalization  Outcome: Progressing  Goal: Absence of fever/infection during anticipated neutropenic period  Outcome: Progressing     Problem: Metabolic/Fluid and Electrolytes -

## 2024-01-11 NOTE — PROGRESS NOTES
01/11/24        I have been asked to see this patient by Jeovany Barrientos MD  for advice/opinion re: -----CKD                                                        Assessment:         CKD stage IIIb-creatinine 2.14 at admission time  Polycystic kidneys  SANDRO on CKD possible-yet to be a ascertained  Prior history of BPH  Vascular dementia-  CAD s/p CABG  S/p second-degree heart block and pacemaker placement Discussion:       Creatinine during hospitalization has been trending 2.1-2.3 since 2022  Creatinine daily trend is 2.11 => 1.70 => 1.69 using manwick.  Urine output 700 mL  Not sure if he has polycystic kidney disease versus multiple cysts in the kidneys.  Genetic counseling for daughter done yesterday  Renal ultrasound in the past showed polycystic kidneys  CT scan abdomen pelvis shows cholecystolithiasis shows cholecystolithiasis with mobility dilation and multiple renal cysts including simple and hyperdense  CT scan head showed  cerebral atrophy and chronic microvascular changes    Plan:   Avoid hypotension  A.m. renal function                   Thanks for consulting me. Renal service will follow patient with you.Please don't hesitate to contact me if any questions arise of if I can assist in any manner.      Ajay Rai MD  Cell no- [phone]  Available on perfect serve.          Signed By: Ajay Rai MD     January 11, 2024           Consult Date: 1/11/2024          Subjective   Seen and examined.  Working with physical therapy.  No new complaints to me.  Eating okay.    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

## 2024-01-11 NOTE — PLAN OF CARE
Problem: Physical Therapy - Adult  Goal: By Discharge: Performs mobility at highest level of function for planned discharge setting.  See evaluation for individualized goals.  Description: FUNCTIONAL STATUS PRIOR TO ADMISSION: Pt lives with his wife and dtr. Dtr present and very supportive. Pt with hx of Alzheimers but had been able to amb with the rollator with someone walking with him and had help with dressing and bathing, getting into shower once a week with wife assist but dtr states this was very difficult. Dtr states that pt had a sudden decline and has not really been able to amb for 4-5 days.    HOME SUPPORT PRIOR TO ADMISSION: The patient lived with wife and dtr, see above.    Physical Therapy Goals  Initiated 1/9/2024  1.  Patient will move from supine to sit and sit to supine, scoot up and down, and roll side to side in bed with moderate assistance within 7 day(s).    2.  Patient will perform sit to stand with moderate assistance within 7 day(s).  3.  Patient will transfer from bed to chair and chair to bed with moderate assistance using the least restrictive device within 7 day(s).  4.  Patient will ambulate with moderate assistance for 10 feet with the least restrictive device within 7 day(s).       Outcome: Progressing   PHYSICAL THERAPY TREATMENT    Patient: Jone Meneses (83 y.o. male)  Date: 1/11/2024  Diagnosis: Adult failure to thrive [R62.7]  Sepsis (HCC) [A41.9]  Ambulatory dysfunction [R26.2] Polyarthralgia      Precautions: Fall Risk, Bed Alarm                    ASSESSMENT:  Patient continues to benefit from skilled PT services and is slowly progressing towards goals. Pt received in bed, agreeable to PT session.  He is pleasantly confused, but able to follow all commands.  Wife arrived during session and pt with good response to her presence and instruction.  Pt able to move to sitting eob with max/tot A x 2. Once eob sitting balance poor with heavy posterior lean,  but improved to  good/fair.  Attempted to assist pt to stand, but appeared to be too much verbal and tactile input.  Wife told pt to stand and no one would help him and he came up with min/mod A x 1 and heavy reliance on bed rails with R and L hands.  Pt with impaired standing balance, with narrow diego, constant posterior lean, and hip and trunk flexion.  Had pt sit back down and eat seated eob with pillows propped behind him.  Best Move used to transfer pt to bedside chair with good outcome.  Pt left seated finishing his lunch.  RN notified. Wife remained at bedside t/o session and is very supportive and realistic.  She reports they have had wheelchair lift installed in garage and hopeful to get hospital bed and [facility]. She is also voicing interest in pt getting rehab prior to returning home; recommending SNF at discharge.          PLAN:  Patient continues to benefit from skilled intervention to address the above impairments.  Continue treatment per established plan of care.    Recommendation for discharge: (in order for the patient to meet his/her long term goals): Therapy up to 5 days/week in Skilled nursing facility    Other factors to consider for discharge: patient's current support system is unable to meet their requirements for physical assistance, high risk for falls, and concern for safely navigating or managing the home environment    IF patient discharges home will need the following DME: continuing to assess with progress - wife request hospital bed, best move/Ela Steady        SUBJECTIVE:   Patient stated, \"My left hip is most of my pain.\"    OBJECTIVE DATA SUMMARY:   Critical Behavior:  Orientation  Orientation Level: Oriented to person;Oriented to place;Disoriented to situation;Disoriented to time  Cognition  Overall Cognitive Status: Exceptions  Arousal/Alertness: Appropriate responses to stimuli  Following Commands: Follows one step commands with repetition;Follows one step commands with

## 2024-01-11 NOTE — CARE COORDINATION
Transition of Care Plan:    RUR: 18% (moderate RUR)  Prior Level of Functioning: Needing some assistance  Disposition: SNF  If SNF or IPR: Date FOC offered: 01/10/24  Date FOC received: Family reviewing  Accepting facility: Referrals to be sent  Date authorization started with reference number:   Date authorization received and expires:   Follow up appointments: defer to SNF  DME needed: defer to SNF  Transportation at discharge: BLS likely needed  IM/IMM Medicare/ letter given: 2nd needed prior to d/c  Is patient a [place] and connected with [facility]? Yes   If yes, was [place] transfer form completed and [facility] notified? Transfer declined, [facility] notified  Caregiver Contact: Linda Meneses, wife, [phone]  Discharge Caregiver contacted prior to discharge? CM to contact  Care Conference needed? Not at this time  Barriers to discharge: Placement, medical clearance    7144 - CM contacted pt's wife for SNF choice, no answer. CM left message. CM will work with DTR in person today if she is at bedside. Pt does not need auth.    0998 - CM contacted pt's DTR for SNF choices, left message.     7284 - CM contacted DTR again for SNF choices, DTR provided [facility] although would like additional time to review list. CM has requested SNF choices by this evening. CM provided FOC list to DTRs email at [email or web address].     CM noted that pt is now showing [employer] as his primary insurance instead of Medicare. This will cause a delay in placement as the pt will now need auth. CM will inquire with family and UR if this is correct.     1617 - CM discussed with DTR and wife. They have not chosen any additional facilities and are requesting more time to review. CM has placed referral to [facility] and [facility] with family's permission. Family requesting to speak with [facility] for possible tour. CM has placed referral and requested that they contact family.       CORI Pablo  Care Management

## 2024-01-11 NOTE — PLAN OF CARE
Speech LAnguage Pathology TREATMENT    Patient: Jone Meneses (83 y.o. male)  Date: 1/11/2024  Primary Diagnosis: Adult failure to thrive [R62.7]  Sepsis (HCC) [A41.9]  Ambulatory dysfunction [R26.2]       Precautions:  Fall Risk, Bed Alarm                  ASSESSMENT :  Based on the objective data described below, the patient presents with mild oral dysphagia characterized by oral holding of solid pills which cleared with liquid wash.  Patient tolerated easy to chew solids, purees, pills crushed in puree and thin liquids via straw without difficulty and free of s/s aspiration.  Recommend continue easy to chew diet, thin liquids.    Patient will benefit from skilled intervention to address the above impairments.     PLAN :  Recommendations and Planned Interventions:  Diet: Easy to chew and thin liquids  Sit up for meals  Small bites  Medication crushed or whole in puree     Acute SLP Services: Yes, SLP will continue to follow per plan of care (2x/week).    Discharge Recommendations: Continue to assess pending progress     SUBJECTIVE:   Patient awake and alert, sitting up after session with physical and occupational therapies.  Patient's wife at bedside.    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, type 1    CAD (coronary artery disease)     Chronic kidney disease     polycystic kidney disease     Past Surgical History:   Procedure Laterality Date    CABG, ARTERY-VEIN, THREE  4/18/2007    COLONOSCOPY      ORTHOPEDIC SURGERY      bilateral bunionectomy    PACEMAKER      VASCULAR SURGERY       Prior Level of Function/Home Situation:   Social/Functional History  Lives With: Daughter, Spouse  Type of Home: House  Home Layout: One level  Home Access: Stairs to enter with rails  Entrance Stairs - Number of Steps: 4  Entrance Stairs - Rails: Right  Bathroom Shower/Tub: Walk-in shower  Bathroom Toilet: Handicap height (raised

## 2024-01-12 LAB
ALBUMIN SERPL-MCNC: 2.8 G/DL (ref 3.5–5)
ANION GAP SERPL CALC-SCNC: 3 MMOL/L (ref 5–15)
BUN SERPL-MCNC: 23 MG/DL (ref 6–20)
BUN/CREAT SERPL: 16 (ref 12–20)
CALCIUM SERPL-MCNC: 8.7 MG/DL (ref 8.5–10.1)
CHLORIDE SERPL-SCNC: 113 MMOL/L (ref 97–108)
CO2 SERPL-SCNC: 24 MMOL/L (ref 21–32)
COMMENT:: NORMAL
CREAT SERPL-MCNC: 1.48 MG/DL (ref 0.7–1.3)
GLUCOSE SERPL-MCNC: 99 MG/DL (ref 65–100)
PHOSPHATE SERPL-MCNC: 2.2 MG/DL (ref 2.6–4.7)
POTASSIUM SERPL-SCNC: 4.6 MMOL/L (ref 3.5–5.1)
SODIUM SERPL-SCNC: 140 MMOL/L (ref 136–145)
SPECIMEN HOLD: NORMAL

## 2024-01-12 PROCEDURE — 1100000003 HC PRIVATE W/ TELEMETRY

## 2024-01-12 PROCEDURE — 6370000000 HC RX 637 (ALT 250 FOR IP): Performed by: GENERAL ACUTE CARE HOSPITAL

## 2024-01-12 PROCEDURE — 6370000000 HC RX 637 (ALT 250 FOR IP): Performed by: INTERNAL MEDICINE

## 2024-01-12 PROCEDURE — 99233 SBSQ HOSP IP/OBS HIGH 50: CPT | Performed by: FAMILY MEDICINE

## 2024-01-12 PROCEDURE — 36415 COLL VENOUS BLD VENIPUNCTURE: CPT

## 2024-01-12 PROCEDURE — 99221 1ST HOSP IP/OBS SF/LOW 40: CPT | Performed by: INTERNAL MEDICINE

## 2024-01-12 PROCEDURE — 2580000003 HC RX 258: Performed by: GENERAL ACUTE CARE HOSPITAL

## 2024-01-12 PROCEDURE — 80069 RENAL FUNCTION PANEL: CPT

## 2024-01-12 PROCEDURE — 6360000002 HC RX W HCPCS: Performed by: INTERNAL MEDICINE

## 2024-01-12 RX ORDER — CASTOR OIL AND BALSAM, PERU 788; 87 MG/G; MG/G
OINTMENT TOPICAL 2 TIMES DAILY
Status: DISCONTINUED | OUTPATIENT
Start: 2024-01-12 | End: 2024-01-19 | Stop reason: HOSPADM

## 2024-01-12 RX ADMIN — ASPIRIN 81 MG: 81 TABLET, COATED ORAL at 09:18

## 2024-01-12 RX ADMIN — ENOXAPARIN SODIUM 40 MG: 100 INJECTION SUBCUTANEOUS at 09:19

## 2024-01-12 RX ADMIN — HYDROCORTISONE: 0.01 CREAM TOPICAL at 17:00

## 2024-01-12 RX ADMIN — PREDNISONE 10 MG: 5 TABLET ORAL at 22:22

## 2024-01-12 RX ADMIN — SODIUM CHLORIDE, PRESERVATIVE FREE 10 ML: 5 INJECTION INTRAVENOUS at 23:27

## 2024-01-12 RX ADMIN — Medication 500 MG: at 09:19

## 2024-01-12 RX ADMIN — Medication: at 22:23

## 2024-01-12 RX ADMIN — MEMANTINE 10 MG: 10 TABLET ORAL at 09:18

## 2024-01-12 RX ADMIN — PREDNISONE 10 MG: 5 TABLET ORAL at 09:18

## 2024-01-12 RX ADMIN — Medication 500 MG: at 23:20

## 2024-01-12 RX ADMIN — ACETAMINOPHEN 1000 MG: 500 TABLET ORAL at 18:20

## 2024-01-12 RX ADMIN — TAMSULOSIN HYDROCHLORIDE 0.4 MG: 0.4 CAPSULE ORAL at 09:18

## 2024-01-12 RX ADMIN — FLUOXETINE 10 MG: 10 CAPSULE ORAL at 09:18

## 2024-01-12 RX ADMIN — PRAVASTATIN SODIUM 80 MG: 40 TABLET ORAL at 22:22

## 2024-01-12 RX ADMIN — HYDROCORTISONE: 0.01 CREAM TOPICAL at 23:26

## 2024-01-12 RX ADMIN — ACETAMINOPHEN 1000 MG: 500 TABLET ORAL at 09:18

## 2024-01-12 RX ADMIN — MEMANTINE 10 MG: 10 TABLET ORAL at 22:23

## 2024-01-12 RX ADMIN — Medication: at 17:00

## 2024-01-12 RX ADMIN — FINASTERIDE 5 MG: 5 TABLET, FILM COATED ORAL at 09:18

## 2024-01-12 RX ADMIN — ACETAMINOPHEN 1000 MG: 500 TABLET ORAL at 23:20

## 2024-01-12 ASSESSMENT — PAIN DESCRIPTION - LOCATION: LOCATION: OTHER (COMMENT)

## 2024-01-12 ASSESSMENT — PAIN - FUNCTIONAL ASSESSMENT: PAIN_FUNCTIONAL_ASSESSMENT: PREVENTS OR INTERFERES SOME ACTIVE ACTIVITIES AND ADLS

## 2024-01-12 ASSESSMENT — PAIN SCALES - GENERAL
PAINLEVEL_OUTOF10: 0
PAINLEVEL_OUTOF10: 6

## 2024-01-12 ASSESSMENT — PAIN DESCRIPTION - DESCRIPTORS: DESCRIPTORS: ACHING

## 2024-01-12 ASSESSMENT — PAIN DESCRIPTION - ORIENTATION: ORIENTATION: LEFT;RIGHT

## 2024-01-12 NOTE — PROGRESS NOTES
End of Shift Note    Bedside shift change report given to ANAM Marques (oncoming nurse) by Margarito Reinoso RN (offgoing nurse).  Report included the following information SBAR, MAR, Recent Results, and Cardiac Rhythm NS    Shift worked:  1900-0730     Shift summary and any significant changes:    Turn Q2hr, pt still has not had bowel movement.  Pt joints still very painful when turning. Scheduled tylenol given.     Concerns for physician to address:  None     Zone phone for oncoming shift:   [phone]       Activity:     Number times ambulated in hallways past shift: 0  Number of times OOB to chair past shift: 0    Cardiac:   Cardiac Monitoring: No           Access:  Current line(s): PIV     Genitourinary:   Urinary status: voiding, incontinent, and external catheter    Respiratory:      Chronic home O2 use?: NO  Incentive spirometer at bedside: NO       GI:     Current diet:  ADULT DIET; Easy to Chew  Passing flatus: YES  Tolerating current diet: YES       Pain Management:   Patient states pain is manageable on current regimen: YES    Skin:     Interventions: turn team, float heels, PT/OT consult, and limit briefs    Patient Safety:  Fall Score:    Interventions: bed/chair alarm and gripper socks       Length of Stay:  Expected LOS: 4  Actual LOS: 4      Margarito Reinoso RN

## 2024-01-12 NOTE — PROGRESS NOTES
End of Shift Note    Bedside shift change report given to ANAM Naylor (oncoming nurse) by Payam Kelley RN (offgoing nurse).  Report included the following information Nurse Handoff Report, MAR, and Recent Results      Shift worked:  days   Shift summary and any significant changes:    Turn Q2hr, up to chair with PT/OT using device to assist with mobility (present in room). Miralax given to assist with BM. Patient joints painful when turning. Scheduled tylenol given.    Concerns for physician to address:  none   Zone phone for oncoming shift:   [phone]     Patient Information  Jone Meneses  83 y.o.  1/8/2024  2:40 PM by Jone Melchor MD. Jone Meneses was admitted from Home    Problem List  Patient Active Problem List    Diagnosis Date Noted    Palliative care by specialist 01/10/2024    Counseling regarding advance care planning and goals of care 01/10/2024    Need for emotional support 01/10/2024    Impaired cognitive ability 01/10/2024    Physical debility 01/10/2024    Polyarthralgia 01/09/2024    Calculus of gallbladder with biliary obstruction but without cholecystitis 11/04/2023    Primary osteoarthritis of right knee 08/19/2023    Venous insufficiency 04/23/2023    Reactive depression 12/29/2022    Physical deconditioning 12/09/2021    Neurogenic bladder 08/08/2021    Cerebral microvascular disease 05/28/2020    Ataxia 04/09/2020    Vitamin D deficiency 04/09/2020    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 04/09/2020    Degenerative cervical spinal stenosis 04/09/2020    Bilateral carotid artery stenosis 04/09/2020    B12 deficiency 04/09/2020    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

## 2024-01-12 NOTE — PROGRESS NOTES
General Daily Progress Note    Admit Date: 1/8/2024    Subjective:     Patient feels better with less pain    Current Facility-Administered Medications   Medication Dose Route Frequency    salsalate (DISALCID) tablet 500 mg  500 mg Oral BID    enoxaparin (LOVENOX) injection 40 mg  40 mg SubCUTAneous Daily    hydrocortisone 1 % cream   Topical BID    finasteride (PROSCAR) tablet 5 mg  5 mg Oral Daily    tamsulosin (FLOMAX) capsule 0.4 mg  0.4 mg Oral Daily    predniSONE (DELTASONE) tablet 10 mg  10 mg Oral BID    acetaminophen (TYLENOL) tablet 1,000 mg  1,000 mg Oral q8h    aspirin EC tablet 81 mg  81 mg Oral Daily    FLUoxetine (PROZAC) capsule 10 mg  10 mg Oral Daily    memantine (NAMENDA) tablet 10 mg  10 mg Oral BID    pravastatin (PRAVACHOL) tablet 80 mg  80 mg Oral QHS    sodium chloride flush 0.9 % injection 5-40 mL  5-40 mL IntraVENous 2 times per day    sodium chloride flush 0.9 % injection 5-40 mL  5-40 mL IntraVENous PRN    0.9 % sodium chloride infusion   IntraVENous PRN    ondansetron (ZOFRAN-ODT) disintegrating tablet 4 mg  4 mg Oral Q8H PRN    Or    ondansetron (ZOFRAN) injection 4 mg  4 mg IntraVENous Q6H PRN    polyethylene glycol (GLYCOLAX) packet 17 g  17 g Oral Daily PRN        Review of Systems  Review of systems not obtained due to patient factors.    Objective:     Patient Vitals for the past 24 hrs:   BP Temp Temp src Pulse Resp SpO2   01/11/24 0850 (!) 149/76 98.6 °F (37 °C) Oral 83 14 98 %   01/11/24 0811 -- 98.6 °F (37 °C) -- -- -- --   01/10/24 2335 (!) 157/87 97.5 °F (36.4 °C) Oral 76 16 100 %       No intake/output data recorded.  01/10 0701 - 01/11 1900  In: -   Out: 700 [Urine:700]    Physical Exam: BP (!) 149/76   Pulse 83   Temp 98.6 °F (37 °C) (Oral)   Resp 14   Ht 1.829 m (6')   Wt 80.3 kg (177 lb 0.5 oz)   SpO2 98%   BMI 24.01 kg/m²   General appearance: alert, appears stated age, cooperative, and confused  Neck: no adenopathy, no carotid bruit, no JVD, supple, symmetrical,

## 2024-01-12 NOTE — CARE COORDINATION
Transition of Care Plan:     RUR: 18% (moderate RUR)  Prior Level of Functioning: Needing some assistance  Disposition: SNF  If SNF or IPR: Date FOC offered: 01/10/24  Date FOC received: Family reviewing  Accepting facility: Referrals to be sent  Date authorization started with reference number:   Date authorization received and expires:   Follow up appointments: defer to SNF  DME needed: defer to SNF  Transportation at discharge: BLS likely needed  IM/IMM Medicare/ letter given: 2nd needed prior to d/c  Is patient a  and connected with VA? Yes              If yes, was  transfer form completed and VA notified? Transfer declined, VA notified  Caregiver Contact: Linda Meneses, wife, [phone]  Discharge Caregiver contacted prior to discharge? CM to contact  Care Conference needed? Not at this time  Barriers to discharge: Placement, medical clearance    9440 - Discussed with pt's wife this AM, she is still reviewing the list. CM requesting update from [facility]. CM provide contact information to family for follow-up as well. CM has requested additional SNF choices, family is still reviewing list of facilities. CM noted that insurance discrepancy has been resolved, pt is now showing Medicare primary.     CM discussed Medicaid eligibility with pt's wife. At this time, pt would not qualify for a First Source screening d/t being over resourced (pt owns home, 2 vehicles, possible life insurance policy and receives SSI > $1000/month, files jointly with his wife). CM has added DSS information to AVS.     1131 - [facility] has declined, CM will request additional referrals today.     1145 - Discussed with DTR at bedside. Preferences are 1) [facility] 2) [facility] and 3) [facility]. CM will place referrals. CM provided information re: Medicare coverage and payment. CM will contact pt's spouse with same information.     1426 - Discussed with wife at bedside. Referrals  still pending. CM will continue to follow.     CORI Pablo  Care Management

## 2024-01-12 NOTE — PROGRESS NOTES
01/12/24        I have been asked to see this patient by Jeovany Barrientos MD  for advice/opinion re: -----CKD                                                        Assessment:         CKD stage IIIb-creatinine 2.14 at admission time  Polycystic kidneys  SANDRO on CKD possible-yet to be a ascertained  Prior history of BPH  Vascular dementia-  CAD s/p CABG  S/p second-degree heart block and pacemaker placement Discussion:       Creatinine during hospitalization has been trending 2.1-2.3 since 2022  Creatinine daily trend is 2.11 => 1.70 => 1.69 => 1.48 using manwick.  Urine output 700 mL  Not sure if he has polycystic kidney disease versus multiple cysts in the kidneys.  Genetic counseling for daughter done yesterday  Renal ultrasound in the past showed polycystic kidneys  CT scan abdomen pelvis shows cholecystolithiasis shows cholecystolithiasis with mobility dilation and multiple renal cysts including simple and hyperdense  CT scan head showed  cerebral atrophy and chronic microvascular changes    Plan:   Renal function remained stable-will sign off.  Please call us again if we can help                   Thanks for consulting me. Renal service will follow patient with you.Please don't hesitate to contact me if any questions arise of if I can assist in any manner.      Ajay Rai MD  Cell no- [phone]  Available on perfect serve.          Signed By: Ajay Rai MD     January 12, 2024           Consult Date: 1/12/2024          Subjective   Seen and examined.  Appears comfortable.  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

## 2024-01-12 NOTE — PROGRESS NOTES
Palliative Medicine  Patient Name: Jone Meneses  YOB: 1940  MRN: [id number]  Age: 83 y.o.  Gender: male    Date of Initial Consult: 1/10/2024  Date of Service: 1/12/2024  Time: 11:56 AM  Provider: Lia Campo MD  Hospital Day: 5  Admit Date: 1/8/2024  Referring Provider: Dr. Laurent       Reasons for Consultation:  Goals of Care    HISTORY OF PRESENT ILLNESS (HPI):   Jone Meneses is a 83 y.o. male with Dementia, HTN, CAD s/p CABG, second-degree heart block s/p PPM, CKD 3, BPH, arthritis who was admitted on 1/8/2024 from home with a diagnosis of fever, FTT, ambulatory dysfunction and worsening dementia. Workup has not revealed an infectious cause. He has been initiated on a course of prednisone for joint pain. PT/OT has recommended SNF. SLP has also evaluated and has recommended easy to chew and thin liquids diet. Nephrology is following for CKD and cysts of kidneys. Flomax and finasteride have been ordered.    1/8/2024 CT Head:  IMPRESSION:  No acute intracranial process.  Imaging findings consistent with moderate chronic microvascular ischemic change.  There is a moderate degree of cerebral atrophy.    Psychosocial: Patient lives with his wife Linda and daughter Dno, who moved in with them relatively recently. Patient and his wife have three children, Frank (lives in [facility]), Owen Ochoa (lives near [place]). Patient spends most of his day sleeping. When family tries to get him up, he tells them he is not hungry and does not want to get up. He eats about one meal per day. He can walk on some days with a RW for a few laps in the house. His daughter tries to help him with floor pedal exercises, but he hasn't really done this in a couple weeks. He has had two falls in the past year. He is retired from working with the [employer]. 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    PALLIATIVE DIAGNOSES:    Encounter for

## 2024-01-12 NOTE — PROGRESS NOTES
General Daily Progress Note    Admit Date: 1/8/2024    Subjective:     Patient feels better    Current Facility-Administered Medications   Medication Dose Route Frequency    balsum peru-castor oil (VENELEX) ointment   Topical BID    salsalate (DISALCID) tablet 500 mg  500 mg Oral BID    enoxaparin (LOVENOX) injection 40 mg  40 mg SubCUTAneous Daily    hydrocortisone 1 % cream   Topical BID    finasteride (PROSCAR) tablet 5 mg  5 mg Oral Daily    tamsulosin (FLOMAX) capsule 0.4 mg  0.4 mg Oral Daily    predniSONE (DELTASONE) tablet 10 mg  10 mg Oral BID    acetaminophen (TYLENOL) tablet 1,000 mg  1,000 mg Oral q8h    aspirin EC tablet 81 mg  81 mg Oral Daily    FLUoxetine (PROZAC) capsule 10 mg  10 mg Oral Daily    memantine (NAMENDA) tablet 10 mg  10 mg Oral BID    pravastatin (PRAVACHOL) tablet 80 mg  80 mg Oral QHS    sodium chloride flush 0.9 % injection 5-40 mL  5-40 mL IntraVENous 2 times per day    sodium chloride flush 0.9 % injection 5-40 mL  5-40 mL IntraVENous PRN    0.9 % sodium chloride infusion   IntraVENous PRN    ondansetron (ZOFRAN-ODT) disintegrating tablet 4 mg  4 mg Oral Q8H PRN    Or    ondansetron (ZOFRAN) injection 4 mg  4 mg IntraVENous Q6H PRN    polyethylene glycol (GLYCOLAX) packet 17 g  17 g Oral Daily PRN        Review of Systems  Review of systems not obtained due to patient factors.    Objective:     Patient Vitals for the past 24 hrs:   BP Temp Temp src Pulse Resp SpO2   01/12/24 1524 (!) 160/81 97.9 °F (36.6 °C) -- 78 18 100 %   01/12/24 0917 (!) 153/97 97.9 °F (36.6 °C) Axillary 85 18 98 %   01/11/24 2318 (!) 153/92 98.2 °F (36.8 °C) -- 82 16 100 %   01/11/24 2137 138/82 98.4 °F (36.9 °C) Oral 85 19 100 %       No intake/output data recorded.  01/10 1901 - 01/12 0700  In: -   Out: 700 [Urine:700]    Physical Exam: BP (!) 160/81   Pulse 78   Temp 97.9 °F (36.6 °C)   Resp 18   Ht 1.829 m (6')   Wt 80.3 kg (177 lb 0.5 oz)   SpO2 100%   BMI 24.01 kg/m²   General appearance: alert,

## 2024-01-12 NOTE — PROGRESS NOTES
4 Eyes Skin Assessment     NAME:  Jone Meneses  YOB: 1940  MEDICAL RECORD NUMBER:  [id number]    The patient is being assessed for  Shift Handoff    I agree that at least one RN has performed a thorough Head to Toe Skin Assessment on the patient. ALL assessment sites listed below have been assessed.      Areas assessed by both nurses:    Head, Face, Ears, Shoulders, Back, Chest, Arms, Elbows, Hands, Sacrum. Buttock, Coccyx, Ischium, and Legs. Feet and Heels        Does the Patient have a Wound? No noted wound(s)       Cooper Prevention initiated by RN: Yes  Wound Care Orders initiated by RN: No    Pressure Injury (Stage 3,4, Unstageable, DTI, NWPT, and Complex wounds) if present, place Wound referral order by RN under : No    New Ostomies, if present place, Ostomy referral order under : No     Nurse 1 eSignature: Electronically signed by Margarito Reinoso RN on 1/11/24 at 7:47 PM EST    **SHARE this note so that the co-signing nurse can place an eSignature**    Nurse 2 eSignature: Electronically signed by Payam Kelley RN on 1/11/24 at 7:47 PM EST

## 2024-01-12 NOTE — PROGRESS NOTES
4 Eyes Skin Assessment     NAME:  Jone Meneses  YOB: 1940  MEDICAL RECORD NUMBER:  [id number]    The patient is being assessed for  Shift Handoff    I agree that at least one RN has performed a thorough Head to Toe Skin Assessment on the patient. ALL assessment sites listed below have been assessed.      Areas assessed by both nurses:    Head, Face, Ears, Shoulders, Back, Chest, Arms, Elbows, Hands, Sacrum. Buttock, Coccyx, Ischium, and Legs. Feet and Heels        Does the Patient have a Wound? No noted wound(s)       Cooper Prevention initiated by RN: Yes  Wound Care Orders initiated by RN: No    Pressure Injury (Stage 3,4, Unstageable, DTI, NWPT, and Complex wounds) if present, place Wound referral order by RN under : Yes    New Ostomies, if present place, Ostomy referral order under : No     Nurse 1 eSignature: Electronically signed by Margarito Reinoso RN on 1/12/24 at 7:48 AM EST    **SHARE this note so that the co-signing nurse can place an eSignature**    Nurse 2 eSignature: Electronically signed by Shelli Renee RN on 1/12/24 at 7:48 AM EST    Alert and oriented to person, place and time

## 2024-01-13 PROCEDURE — 6370000000 HC RX 637 (ALT 250 FOR IP): Performed by: INTERNAL MEDICINE

## 2024-01-13 PROCEDURE — 99221 1ST HOSP IP/OBS SF/LOW 40: CPT | Performed by: INTERNAL MEDICINE

## 2024-01-13 PROCEDURE — 1100000003 HC PRIVATE W/ TELEMETRY

## 2024-01-13 PROCEDURE — 6360000002 HC RX W HCPCS: Performed by: INTERNAL MEDICINE

## 2024-01-13 PROCEDURE — 6370000000 HC RX 637 (ALT 250 FOR IP): Performed by: GENERAL ACUTE CARE HOSPITAL

## 2024-01-13 PROCEDURE — 2580000003 HC RX 258: Performed by: GENERAL ACUTE CARE HOSPITAL

## 2024-01-13 RX ORDER — PREDNISONE 5 MG/1
5 TABLET ORAL 2 TIMES DAILY
Status: DISCONTINUED | OUTPATIENT
Start: 2024-01-14 | End: 2024-01-14

## 2024-01-13 RX ORDER — BISACODYL 5 MG/1
10 TABLET, DELAYED RELEASE ORAL DAILY PRN
Status: DISCONTINUED | OUTPATIENT
Start: 2024-01-13 | End: 2024-01-19 | Stop reason: HOSPADM

## 2024-01-13 RX ORDER — LACTULOSE 10 G/15ML
20 SOLUTION ORAL 3 TIMES DAILY
Status: DISCONTINUED | OUTPATIENT
Start: 2024-01-13 | End: 2024-01-19 | Stop reason: HOSPADM

## 2024-01-13 RX ADMIN — LACTULOSE 20 G: 20 SOLUTION ORAL at 21:53

## 2024-01-13 RX ADMIN — LACTULOSE 20 G: 20 SOLUTION ORAL at 17:02

## 2024-01-13 RX ADMIN — SODIUM CHLORIDE, PRESERVATIVE FREE 10 ML: 5 INJECTION INTRAVENOUS at 21:57

## 2024-01-13 RX ADMIN — MEMANTINE 10 MG: 10 TABLET ORAL at 21:46

## 2024-01-13 RX ADMIN — FINASTERIDE 5 MG: 5 TABLET, FILM COATED ORAL at 09:08

## 2024-01-13 RX ADMIN — ENOXAPARIN SODIUM 40 MG: 100 INJECTION SUBCUTANEOUS at 09:09

## 2024-01-13 RX ADMIN — PREDNISONE 10 MG: 5 TABLET ORAL at 09:08

## 2024-01-13 RX ADMIN — FLUOXETINE 10 MG: 10 CAPSULE ORAL at 09:08

## 2024-01-13 RX ADMIN — ACETAMINOPHEN 1000 MG: 500 TABLET ORAL at 09:12

## 2024-01-13 RX ADMIN — ACETAMINOPHEN 1000 MG: 500 TABLET ORAL at 23:32

## 2024-01-13 RX ADMIN — POLYETHYLENE GLYCOL 3350 17 G: 17 POWDER, FOR SOLUTION ORAL at 09:08

## 2024-01-13 RX ADMIN — Medication 500 MG: at 21:50

## 2024-01-13 RX ADMIN — BISACODYL 10 MG: 5 TABLET, COATED ORAL at 17:02

## 2024-01-13 RX ADMIN — ASPIRIN 81 MG: 81 TABLET, COATED ORAL at 09:08

## 2024-01-13 RX ADMIN — ACETAMINOPHEN 1000 MG: 500 TABLET ORAL at 15:55

## 2024-01-13 RX ADMIN — PRAVASTATIN SODIUM 80 MG: 40 TABLET ORAL at 21:48

## 2024-01-13 RX ADMIN — TAMSULOSIN HYDROCHLORIDE 0.4 MG: 0.4 CAPSULE ORAL at 09:08

## 2024-01-13 RX ADMIN — MEMANTINE 10 MG: 10 TABLET ORAL at 09:08

## 2024-01-13 RX ADMIN — Medication 500 MG: at 09:11

## 2024-01-13 RX ADMIN — Medication: at 09:18

## 2024-01-13 RX ADMIN — HYDROCORTISONE: 0.01 CREAM TOPICAL at 21:55

## 2024-01-13 RX ADMIN — HYDROCORTISONE: 0.01 CREAM TOPICAL at 09:18

## 2024-01-13 RX ADMIN — Medication: at 21:41

## 2024-01-13 ASSESSMENT — PAIN SCALES - GENERAL
PAINLEVEL_OUTOF10: 0
PAINLEVEL_OUTOF10: 0

## 2024-01-13 NOTE — PLAN OF CARE
Problem: Discharge Planning  Goal: Discharge to home or other facility with appropriate resources  Outcome: Progressing     Problem: Safety - Adult  Goal: Free from fall injury  Outcome: Progressing     Problem: Skin/Tissue Integrity  Goal: Absence of new skin breakdown  Description: 1.  Monitor for areas of redness and/or skin breakdown  2.  Assess vascular access sites hourly  3.  Every 4-6 hours minimum:  Change oxygen saturation probe site  4.  Every 4-6 hours:  If on nasal continuous positive airway pressure, respiratory therapy assess nares and determine need for appliance change or resting period.  Outcome: Progressing     Problem: ABCDS Injury Assessment  Goal: Absence of physical injury  Outcome: Progressing     Problem: Neurosensory - Adult  Goal: Achieves stable or improved neurological status  Outcome: Progressing     Problem: Cardiovascular - Adult  Goal: Maintains optimal cardiac output and hemodynamic stability  Outcome: Progressing     Problem: Skin/Tissue Integrity - Adult  Goal: Skin integrity remains intact  Outcome: Progressing  Goal: Incisions, wounds, or drain sites healing without S/S of infection  Outcome: Progressing  Goal: Oral mucous membranes remain intact  Outcome: Progressing     Problem: Musculoskeletal - Adult  Goal: Return mobility to safest level of function  Outcome: Progressing

## 2024-01-13 NOTE — PROGRESS NOTES
Spiritual Care Assessment/Progress Note  [facility]    Name: Jone Meneses MRN: [id number]    Age: 83 y.o.     Sex: male   Language: English     Date: 1/13/2024            Total Time Calculated: 17 min              Spiritual Assessment begun in [facility] NEUROSCIENCE TELEMETRY  Service Provided For:: Patient and family together (Family's )  Referral/Consult From:: Rounding  Encounter Overview/Reason : Initial Encounter    Spiritual beliefs:      [x] Involved in a mary tradition/spiritual practice:      [x] Supported by a mary community:      [] Claims no spiritual orientation:      [] Seeking spiritual identity:           [] Adheres to an individual form of spirituality:      [] Not able to assess:                Identified resources for coping and support system:   Support System: Spouse, Children, Family members, Baptist/mary community       [x] Prayer                  [] Devotional reading               [] Music                  [] Guided Imagery     [] Pet visits                                        [] Other: (COMMENT)     Specific area/focus of visit   Encounter:    Crisis:    Spiritual/Emotional needs: Type: Spiritual Support  Ritual, Rites and Sacraments:    Grief, Loss, and Adjustments:    Ethics/Mediation:    Behavioral Health:    Palliative Care:    Advance Care Planning:      Plan/Referrals: Continue Support (comment) (advised family of  availability)    Narrative:   Reviewed chart prior to visit on Neuro unit for spiritual assessment.  Patient's wife, son, daughter-in-law and family's  were visiting.   is Rev. Luo.  Introduced self and role. Patient was laying in bed appearing comfortable, smiling, but saying very little.  Rev Luo offered prayer with family and  surrounding patient.  Offered supportive presence and assurance of prayer.  Family was appreciative of visit.  Advised of ongoing availability of pastoral support.    Lorena

## 2024-01-13 NOTE — PROGRESS NOTES
End of Shift Note    Bedside shift change report given to ANAM Baez (oncoming nurse) by Shelli Renee RN (offgoing nurse).  Report included the following information Nurse Handoff Report, MAR, and Recent Results      Shift worked:  days   Shift summary and any significant changes:    Turn Q2hr, up to bsc with PT/OT using device to assist with mobility (present in room).   Patient joints painful when turning. Scheduled tylenol given.      Concerns for physician to address:  none   Zone phone for oncoming shift:   [phone]     Patient Information  Jone Meneses  83 y.o.  1/8/2024  2:40 PM by Jone Melchor MD. Jone Meneses was admitted from Home    Problem List  Patient Active Problem List    Diagnosis Date Noted    Palliative care by specialist 01/10/2024    Counseling regarding advance care planning and goals of care 01/10/2024    Need for emotional support 01/10/2024    Impaired cognitive ability 01/10/2024    Physical debility 01/10/2024    Polyarthralgia 01/09/2024    Calculus of gallbladder with biliary obstruction but without cholecystitis 11/04/2023    Primary osteoarthritis of right knee 08/19/2023    Venous insufficiency 04/23/2023    Reactive depression 12/29/2022    Physical deconditioning 12/09/2021    Neurogenic bladder 08/08/2021    Cerebral microvascular disease 05/28/2020    Ataxia 04/09/2020    Vitamin D deficiency 04/09/2020    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

## 2024-01-14 LAB
BACTERIA SPEC CULT: NORMAL
BACTERIA SPEC CULT: NORMAL
SERVICE CMNT-IMP: NORMAL
SERVICE CMNT-IMP: NORMAL

## 2024-01-14 PROCEDURE — 6370000000 HC RX 637 (ALT 250 FOR IP): Performed by: INTERNAL MEDICINE

## 2024-01-14 PROCEDURE — 6370000000 HC RX 637 (ALT 250 FOR IP): Performed by: GENERAL ACUTE CARE HOSPITAL

## 2024-01-14 PROCEDURE — 2580000003 HC RX 258: Performed by: GENERAL ACUTE CARE HOSPITAL

## 2024-01-14 PROCEDURE — 99221 1ST HOSP IP/OBS SF/LOW 40: CPT | Performed by: INTERNAL MEDICINE

## 2024-01-14 PROCEDURE — 6360000002 HC RX W HCPCS: Performed by: INTERNAL MEDICINE

## 2024-01-14 PROCEDURE — 1100000003 HC PRIVATE W/ TELEMETRY

## 2024-01-14 RX ORDER — PREDNISONE 5 MG/1
10 TABLET ORAL DAILY
Status: DISCONTINUED | OUTPATIENT
Start: 2024-01-15 | End: 2024-01-17

## 2024-01-14 RX ADMIN — MEMANTINE 10 MG: 10 TABLET ORAL at 21:38

## 2024-01-14 RX ADMIN — Medication: at 21:48

## 2024-01-14 RX ADMIN — ACETAMINOPHEN 1000 MG: 500 TABLET ORAL at 14:45

## 2024-01-14 RX ADMIN — Medication 500 MG: at 10:17

## 2024-01-14 RX ADMIN — HYDROCORTISONE: 0.01 CREAM TOPICAL at 21:49

## 2024-01-14 RX ADMIN — SODIUM CHLORIDE, PRESERVATIVE FREE 10 ML: 5 INJECTION INTRAVENOUS at 21:50

## 2024-01-14 RX ADMIN — FLUOXETINE 10 MG: 10 CAPSULE ORAL at 10:15

## 2024-01-14 RX ADMIN — ACETAMINOPHEN 1000 MG: 500 TABLET ORAL at 05:15

## 2024-01-14 RX ADMIN — ASPIRIN 81 MG: 81 TABLET, COATED ORAL at 10:15

## 2024-01-14 RX ADMIN — ENOXAPARIN SODIUM 40 MG: 100 INJECTION SUBCUTANEOUS at 10:15

## 2024-01-14 RX ADMIN — FINASTERIDE 5 MG: 5 TABLET, FILM COATED ORAL at 10:15

## 2024-01-14 RX ADMIN — TAMSULOSIN HYDROCHLORIDE 0.4 MG: 0.4 CAPSULE ORAL at 10:15

## 2024-01-14 RX ADMIN — Medication 500 MG: at 21:45

## 2024-01-14 RX ADMIN — MEMANTINE 10 MG: 10 TABLET ORAL at 10:15

## 2024-01-14 RX ADMIN — LACTULOSE 20 G: 20 SOLUTION ORAL at 10:15

## 2024-01-14 RX ADMIN — PRAVASTATIN SODIUM 80 MG: 40 TABLET ORAL at 21:43

## 2024-01-14 ASSESSMENT — PAIN SCALES - GENERAL
PAINLEVEL_OUTOF10: 0
PAINLEVEL_OUTOF10: 0

## 2024-01-14 NOTE — PROGRESS NOTES
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 No Placement date  Reason Medically Necessary   PICC LINE? No Placement date Reason Medically Necessary     Genitourinary:   Urinary status: incontinent  Urinary Catheter? No Placement Date  Reason Medically Necessary     Respiratory:   O2 Device: None (Room air)  Chronic home O2 use?: no  Incentive spirometer at bedside: no       GI:     Current diet:  ADULT DIET; Easy to Chew  Passing flatus: yes  Tolerating current diet: yes       Pain Management:   Patient states pain is manageable on current regimen: yes    Skin:  Cooper Scale Score: 15  Interventions: float heels, increase time out of bed, PT/OT consult, and internal/external urinary devices    Patient Safety:  Fall Score: Randhawa Total Score: 95  Interventions: bed/chair alarm, assistive devices (walker, cane, etc.), gripper socks, pt to call before getting OOB, and stay with me (per policy)     @Rollbelt  @dexterity to release roll belt  Yes/No ( must document dexterity  here by stating Yes or No here, otherwise this is a restraint and must follow restraint documentation policy.)    DVT prophylaxis:  DVT prophylaxis Med- yes  DVT prophylaxis SCD or LUCRETIA- no     Wounds: (If Applicable)  Wounds- no  Location     Active  Consults:  IP CONSULT TO PALLIATIVE CARE  IP CONSULT TO NEPHROLOGY    Length of Stay:  Expected LOS: 5  Actual LOS: 5  Discharge Plan: yes ERNIE Renee RN

## 2024-01-14 NOTE — PROGRESS NOTES
General Daily Progress Note    Admit Date: 1/8/2024    Subjective:     Patient feels better    Current Facility-Administered Medications   Medication Dose Route Frequency    bisacodyl (DULCOLAX) EC tablet 10 mg  10 mg Oral Daily PRN    lactulose (CHRONULAC) 10 GM/15ML solution 20 g  20 g Oral TID    [START ON 1/14/2024] predniSONE (DELTASONE) tablet 5 mg  5 mg Oral BID    balsum peru-castor oil (VENELEX) ointment   Topical BID    salsalate (DISALCID) tablet 500 mg  500 mg Oral BID    enoxaparin (LOVENOX) injection 40 mg  40 mg SubCUTAneous Daily    hydrocortisone 1 % cream   Topical BID    finasteride (PROSCAR) tablet 5 mg  5 mg Oral Daily    tamsulosin (FLOMAX) capsule 0.4 mg  0.4 mg Oral Daily    acetaminophen (TYLENOL) tablet 1,000 mg  1,000 mg Oral q8h    aspirin EC tablet 81 mg  81 mg Oral Daily    FLUoxetine (PROZAC) capsule 10 mg  10 mg Oral Daily    memantine (NAMENDA) tablet 10 mg  10 mg Oral BID    pravastatin (PRAVACHOL) tablet 80 mg  80 mg Oral QHS    sodium chloride flush 0.9 % injection 5-40 mL  5-40 mL IntraVENous 2 times per day    sodium chloride flush 0.9 % injection 5-40 mL  5-40 mL IntraVENous PRN    0.9 % sodium chloride infusion   IntraVENous PRN    ondansetron (ZOFRAN-ODT) disintegrating tablet 4 mg  4 mg Oral Q8H PRN    Or    ondansetron (ZOFRAN) injection 4 mg  4 mg IntraVENous Q6H PRN    polyethylene glycol (GLYCOLAX) packet 17 g  17 g Oral Daily PRN        Review of Systems  Review of systems not obtained due to patient factors.    Objective:     Patient Vitals for the past 24 hrs:   BP Temp Temp src Pulse Resp SpO2   01/13/24 2029 (!) 135/97 97.7 °F (36.5 °C) -- 78 18 100 %   01/13/24 1517 136/89 97.9 °F (36.6 °C) Oral 84 16 99 %   01/13/24 0906 (!) 154/96 97.5 °F (36.4 °C) Oral 80 12 100 %   01/12/24 2330 (!) 155/98 97.5 °F (36.4 °C) -- 76 16 98 %       No intake/output data recorded.  01/12 0701 - 01/13 1900  In: -   Out: 780 [Urine:780]    Physical Exam: BP (!) 135/97   Pulse 78

## 2024-01-14 NOTE — PROGRESS NOTES
End of Shift Note    Bedside shift change report given to ANAM Marques (oncoming nurse) by Trena Chow RN (offgoing nurse).  Report included the following information Nurse Handoff Report, MAR, and Recent Results      Shift worked:  7p - 7a   Shift summary and any significant changes:    No acute changes.  BM during the night.  Turned during the night.       Concerns for physician to address:  none   Zone phone for oncoming shift:   [phone]     Patient Information  Jone Meneses  83 y.o.  1/8/2024  2:40 PM by Jone Melchor MD. Jone Meneses was admitted from Home    Problem List  Patient Active Problem List    Diagnosis Date Noted    Palliative care by specialist 01/10/2024    Counseling regarding advance care planning and goals of care 01/10/2024    Need for emotional support 01/10/2024    Impaired cognitive ability 01/10/2024    Physical debility 01/10/2024    Polyarthralgia 01/09/2024    Calculus of gallbladder with biliary obstruction but without cholecystitis 11/04/2023    Primary osteoarthritis of right knee 08/19/2023    Venous insufficiency 04/23/2023    Reactive depression 12/29/2022    Physical deconditioning 12/09/2021    Neurogenic bladder 08/08/2021    Cerebral microvascular disease 05/28/2020    Ataxia 04/09/2020    Vitamin D deficiency 04/09/2020    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

## 2024-01-15 PROBLEM — Z71.89 ADVANCE CARE PLANNING: Status: ACTIVE | Noted: 2024-01-15

## 2024-01-15 PROBLEM — R62.7 ADULT FAILURE TO THRIVE: Status: ACTIVE | Noted: 2024-01-15

## 2024-01-15 PROBLEM — Z71.89 DNR (DO NOT RESUSCITATE) DISCUSSION: Status: ACTIVE | Noted: 2024-01-15

## 2024-01-15 PROCEDURE — 6370000000 HC RX 637 (ALT 250 FOR IP): Performed by: INTERNAL MEDICINE

## 2024-01-15 PROCEDURE — 6370000000 HC RX 637 (ALT 250 FOR IP): Performed by: GENERAL ACUTE CARE HOSPITAL

## 2024-01-15 PROCEDURE — 97116 GAIT TRAINING THERAPY: CPT

## 2024-01-15 PROCEDURE — 99497 ADVNCD CARE PLAN 30 MIN: CPT | Performed by: NURSE PRACTITIONER

## 2024-01-15 PROCEDURE — 97535 SELF CARE MNGMENT TRAINING: CPT | Performed by: OCCUPATIONAL THERAPIST

## 2024-01-15 PROCEDURE — 2580000003 HC RX 258: Performed by: GENERAL ACUTE CARE HOSPITAL

## 2024-01-15 PROCEDURE — 97530 THERAPEUTIC ACTIVITIES: CPT | Performed by: OCCUPATIONAL THERAPIST

## 2024-01-15 PROCEDURE — 6360000002 HC RX W HCPCS: Performed by: INTERNAL MEDICINE

## 2024-01-15 PROCEDURE — 97530 THERAPEUTIC ACTIVITIES: CPT

## 2024-01-15 PROCEDURE — 1100000003 HC PRIVATE W/ TELEMETRY

## 2024-01-15 PROCEDURE — 99221 1ST HOSP IP/OBS SF/LOW 40: CPT | Performed by: INTERNAL MEDICINE

## 2024-01-15 RX ADMIN — FLUOXETINE 10 MG: 10 CAPSULE ORAL at 09:16

## 2024-01-15 RX ADMIN — FINASTERIDE 5 MG: 5 TABLET, FILM COATED ORAL at 09:16

## 2024-01-15 RX ADMIN — ENOXAPARIN SODIUM 40 MG: 100 INJECTION SUBCUTANEOUS at 09:16

## 2024-01-15 RX ADMIN — TAMSULOSIN HYDROCHLORIDE 0.4 MG: 0.4 CAPSULE ORAL at 09:15

## 2024-01-15 RX ADMIN — MEMANTINE 10 MG: 10 TABLET ORAL at 09:16

## 2024-01-15 RX ADMIN — ACETAMINOPHEN 1000 MG: 500 TABLET ORAL at 22:10

## 2024-01-15 RX ADMIN — Medication 500 MG: at 22:34

## 2024-01-15 RX ADMIN — PRAVASTATIN SODIUM 80 MG: 40 TABLET ORAL at 22:10

## 2024-01-15 RX ADMIN — Medication 500 MG: at 09:23

## 2024-01-15 RX ADMIN — ASPIRIN 81 MG: 81 TABLET, COATED ORAL at 09:16

## 2024-01-15 RX ADMIN — ACETAMINOPHEN 1000 MG: 500 TABLET ORAL at 00:26

## 2024-01-15 RX ADMIN — ACETAMINOPHEN 1000 MG: 500 TABLET ORAL at 06:57

## 2024-01-15 RX ADMIN — HYDROCORTISONE: 0.01 CREAM TOPICAL at 09:17

## 2024-01-15 RX ADMIN — Medication: at 22:19

## 2024-01-15 RX ADMIN — SODIUM CHLORIDE, PRESERVATIVE FREE 10 ML: 5 INJECTION INTRAVENOUS at 09:16

## 2024-01-15 RX ADMIN — PREDNISONE 10 MG: 5 TABLET ORAL at 09:15

## 2024-01-15 RX ADMIN — SODIUM CHLORIDE, PRESERVATIVE FREE 10 ML: 5 INJECTION INTRAVENOUS at 22:10

## 2024-01-15 RX ADMIN — ACETAMINOPHEN 1000 MG: 500 TABLET ORAL at 17:22

## 2024-01-15 RX ADMIN — MEMANTINE 10 MG: 10 TABLET ORAL at 22:10

## 2024-01-15 RX ADMIN — Medication: at 09:17

## 2024-01-15 RX ADMIN — HYDROCORTISONE: 0.01 CREAM TOPICAL at 22:18

## 2024-01-15 ASSESSMENT — PAIN SCALES - GENERAL
PAINLEVEL_OUTOF10: 0
PAINLEVEL_OUTOF10: 2

## 2024-01-15 ASSESSMENT — PAIN DESCRIPTION - LOCATION: LOCATION: BACK

## 2024-01-15 NOTE — ACP (ADVANCE CARE PLANNING)
Palliative Medicine  Patient Name: Jone Meneses  YOB: 1940  MRN: [id number]  Age: 83 y.o.  Gender: male    Date of Initial Consult: 1/10/2024  Date of Service: 1/15/2024  Time: 2:35 PM  Provider: ROXANNE Najera NP  Hospital Day: 8  Admit Date: 1/8/2024  Referring Provider: Dr. Laurent     Spoke with Mrs Meneses via phone- she shared that they are \"considering DNR\" but that she wants to ensure that all her daughters questions are answered prior to making this step- she asked me to meet with her daughter who is at bedside    I met with Don outside the room- Mr Meneses is alert and interactive, but comprehension for complex decisions is lacking so this conversation could likely prove distressing for him .     We talked through DNR, and how ideally one makes this decision before they are at the end of their life.  I also reinforced that it does not go into effect until after death    Don is more concerned about decision making during the dying process- she is clear that her dad has voiced not wanting to be on machines/life support/ invasive procedures, but at that same token they are trying to balance what medical interventions will provide support and what will cause more suffering.     She is asking all the right questions, and thinking through benefit/burden of interventions as his body is failing- I encouraged her to continue to evaluate this as decisions come up in the coming months/years.    She and her mom will talk this evening about the DNR- I let her know she can call me tomorrow if they decide to envoke it, or if more questions come up    ROXANNE Najera NP    I spent 30 min of voluntary ACP time with Don and Mrs Meneses

## 2024-01-15 NOTE — PROGRESS NOTES
End of Shift Note    Bedside shift change report given to  ANAM Baez (oncoming nurse) by DELMIS ALSTON RN (offgoing nurse).  Report included the following information Nurse Handoff Report, MAR, and Recent Results      Shift worked:  days   Shift summary and any significant changes:    Refused Lactulose       Concerns for physician to address:  none   Zone phone for oncoming shift:   [phone]     Patient Information  Jone Meneses  83 y.o.  1/8/2024  2:40 PM by Jone Melchor MD. Jone Meneses was admitted from Home    Problem List  Patient Active Problem List    Diagnosis Date Noted    Adult failure to thrive 01/15/2024    DNR (do not resuscitate) discussion 01/15/2024    Advance care planning 01/15/2024    Palliative care by specialist 01/10/2024    Counseling regarding advance care planning and goals of care 01/10/2024    Need for emotional support 01/10/2024    Impaired cognitive ability 01/10/2024    Physical debility 01/10/2024    Polyarthralgia 01/09/2024    Calculus of gallbladder with biliary obstruction but without cholecystitis 11/04/2023    Primary osteoarthritis of right knee 08/19/2023    Venous insufficiency 04/23/2023    Reactive depression 12/29/2022    Physical deconditioning 12/09/2021    Neurogenic bladder 08/08/2021    Cerebral microvascular disease 05/28/2020    Ataxia 04/09/2020    Vitamin D deficiency 04/09/2020    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

## 2024-01-15 NOTE — PLAN OF CARE
Problem: Occupational Therapy - Adult  Goal: By Discharge: Performs self-care activities at highest level of function for planned discharge setting.  See evaluation for individualized goals.  Description: FUNCTIONAL STATUS PRIOR TO ADMISSION:  Pt has a history of Alzheimers but was ambulating with rollator walker and one assist, wife was getting in the shower with pt 1x wk to assist with bathing, recent difficult with pt stepping over lip of shower, family placed a RTS over toilet but pt was still unable to get up off of commode without assist, pt was able to assist with straightening shirt over body but family otherwise dresses pt, pt was able to cut food and self feed with utensils but most recently pt has been finger feeding and unable to use utensils, sudden decline in overall mobility over the past 4-5 days and pt was bed bound just prior to admit    HOME SUPPORT: Patient lived elderly wife and pts daughter moved in with her parents to assist and is very involved in pts care.    Occupational Therapy Goals:  Initiated 1/9/2024  1.  Patient will perform grooming with Stand by Assist within 7 day(s).  2.  Patient will perform self-feeding with Supervision within 7 day(s).  3.  Patient will perform upper body dressing with Moderate Assist within 7 day(s).  4.  Patient will perform sit to stand with moderate assist in prep for ADL transfers within 7 day(s).  5.  Patient will perform upper body bathing with SBA seated with good balance within 7 day(s).        Outcome: Progressing   OCCUPATIONAL THERAPY TREATMENT  Patient: Jone Meneses (83 y.o. male)  Date: 1/15/2024  Primary Diagnosis: Adult failure to thrive [R62.7]  Sepsis (HCC) [A41.9]  Ambulatory dysfunction [R26.2]       Precautions: Fall Risk, Bed Alarm                Chart, occupational therapy assessment, plan of care, and goals were reviewed.    ASSESSMENT  Patient continues to benefit from skilled OT services and is slowly progressing towards goals.  Pt was more alert and had better initiation of movement/motor planning.  His daughter remains at bedside and remains supportive.  Vast improvement in bed mobility, balance and mobility overall.  Pt was able to come to seated edge of bed with min assist for supine to sit.  Min assist x2 for sit to stand and CGA to moderate assist for balance standing for lydia/anal care.  Balance improved over time but pt does have sway with bilateral hands off walker.  Verbal/manual cues and min to min assist x2 needed for mobility/walker management around objects.  Continue to recommend SNF for rehab at discharge.               PLAN :  Patient continues to benefit from skilled intervention to address the above impairments.  Continue treatment per established plan of care to address goals.    Recommend with staff: two assist to bedside commode with RW, continence checks. Out of bed to chair with assist for meals    Recommend next OT session: balance, mobility and ADLS    Recommendation for discharge: (in order for the patient to meet his/her long term goals): Therapy up to 5 days/week in Skilled nursing facility    Other factors to consider for discharge: patient's current support system is unable to meet their requirements for physical assistance, high risk for falls, and concern for safely navigating or managing the home environment    IF patient discharges home will need the following DME:  bedside commode, urinal       SUBJECTIVE:   Patient stated “I am doing ok.”    OBJECTIVE DATA SUMMARY:   Cognitive/Behavioral Status:  Orientation  Overall Orientation Status: Impaired  Orientation Level: Oriented to person  Cognition  Arousal/Alertness: Appropriate responses to stimuli  Following Commands: Follows multistep commands with repitition  Attention Span: Attends with cues to redirect  Memory: Decreased short term memory  Safety Judgement: Good awareness of safety precautions  Problem Solving: Assistance required to implement

## 2024-01-15 NOTE — PROGRESS NOTES
General Daily Progress Note    Admit Date: 1/8/2024    Subjective:     Patient feels better    Current Facility-Administered Medications   Medication Dose Route Frequency    [START ON 1/15/2024] predniSONE (DELTASONE) tablet 10 mg  10 mg Oral Daily    bisacodyl (DULCOLAX) EC tablet 10 mg  10 mg Oral Daily PRN    lactulose (CHRONULAC) 10 GM/15ML solution 20 g  20 g Oral TID    balsum peru-castor oil (VENELEX) ointment   Topical BID    salsalate (DISALCID) tablet 500 mg  500 mg Oral BID    enoxaparin (LOVENOX) injection 40 mg  40 mg SubCUTAneous Daily    hydrocortisone 1 % cream   Topical BID    finasteride (PROSCAR) tablet 5 mg  5 mg Oral Daily    tamsulosin (FLOMAX) capsule 0.4 mg  0.4 mg Oral Daily    acetaminophen (TYLENOL) tablet 1,000 mg  1,000 mg Oral q8h    aspirin EC tablet 81 mg  81 mg Oral Daily    FLUoxetine (PROZAC) capsule 10 mg  10 mg Oral Daily    memantine (NAMENDA) tablet 10 mg  10 mg Oral BID    pravastatin (PRAVACHOL) tablet 80 mg  80 mg Oral QHS    sodium chloride flush 0.9 % injection 5-40 mL  5-40 mL IntraVENous 2 times per day    sodium chloride flush 0.9 % injection 5-40 mL  5-40 mL IntraVENous PRN    0.9 % sodium chloride infusion   IntraVENous PRN    ondansetron (ZOFRAN-ODT) disintegrating tablet 4 mg  4 mg Oral Q8H PRN    Or    ondansetron (ZOFRAN) injection 4 mg  4 mg IntraVENous Q6H PRN    polyethylene glycol (GLYCOLAX) packet 17 g  17 g Oral Daily PRN        Review of Systems  Review of systems not obtained due to patient factors.    Objective:     Patient Vitals for the past 24 hrs:   BP Temp Temp src Pulse Resp SpO2   01/14/24 2012 (!) 141/84 97.9 °F (36.6 °C) -- 78 18 98 %   01/14/24 1622 (!) 128/90 98.1 °F (36.7 °C) -- 79 16 98 %   01/14/24 0808 (!) 140/84 97.7 °F (36.5 °C) Oral 76 12 98 %   01/13/24 2320 (!) 139/91 97.5 °F (36.4 °C) Oral 81 16 98 %       No intake/output data recorded.  01/13 0701 - 01/14 1900  In: -   Out: 300 [Urine:300]    Physical Exam: BP (!) 141/84   Pulse 78

## 2024-01-15 NOTE — PLAN OF CARE
Problem: Safety - Adult  Goal: Free from fall injury  Flowsheets (Taken 1/15/2024 5532)  Free From Fall Injury:   Instruct family/caregiver on patient safety   Based on caregiver fall risk screen, instruct family/caregiver to ask for assistance with transferring infant if caregiver noted to have fall risk factors

## 2024-01-15 NOTE — PLAN OF CARE
Problem: Physical Therapy - Adult  Goal: By Discharge: Performs mobility at highest level of function for planned discharge setting.  See evaluation for individualized goals.  Description: FUNCTIONAL STATUS PRIOR TO ADMISSION: Pt lives with his wife and dtr. Dtr present and very supportive. Pt with hx of Alzheimers but had been able to amb with the rollator with someone walking with him and had help with dressing and bathing, getting into shower once a week with wife assist but dtr states this was very difficult. Dtr states that pt had a sudden decline and has not really been able to amb for 4-5 days.    HOME SUPPORT PRIOR TO ADMISSION: The patient lived with wife and dtr, see above.    Physical Therapy Goals  Initiated 1/9/2024  1.  Patient will move from supine to sit and sit to supine, scoot up and down, and roll side to side in bed with moderate assistance within 7 day(s).    2.  Patient will perform sit to stand with moderate assistance within 7 day(s).  3.  Patient will transfer from bed to chair and chair to bed with moderate assistance using the least restrictive device within 7 day(s).  4.  Patient will ambulate with moderate assistance for 10 feet with the least restrictive device within 7 day(s).       Outcome: Progressing    PHYSICAL THERAPY TREATMENT    Patient: Jone Meneses (83 y.o. male)  Date: 1/15/2024  Diagnosis: Adult failure to thrive [R62.7]  Sepsis (HCC) [A41.9]  Ambulatory dysfunction [R26.2] Polyarthralgia      Precautions: Fall Risk, Bed Alarm                    ASSESSMENT:  Patient continues to benefit from skilled PT services and is slowly progressing towards goals. Pt was received in supine and cleared by nursing to mobilize. Pt was able to mobilize to the EOB and stand with RW and 2 person A. Noted increased posterior lean initially. Did better with visual goals like walking to daughter or walking to the chair, he was able to ambulate to the window and then around the room.       Provided: Role of Therapy;Plan of Care  Education Method: Verbal  Barriers to Learning: Cognition  Education Outcome: Continued education needed      Marie Martinez, PT, DPT  Minutes: 23

## 2024-01-15 NOTE — PROGRESS NOTES
(If Applicable)  Wounds- no  Location     Active Consults:  IP CONSULT TO PALLIATIVE CARE  IP CONSULT TO NEPHROLOGY    Length of Stay:  Expected LOS: 7  Actual LOS: 6  Discharge Plan: yes ERNIE Renee RN

## 2024-01-15 NOTE — CARE COORDINATION
Transition of Care Plan:    RUR:  17%  Prior Level of Functioning: needs some assistance  Disposition: SNF    If SNF or IPR: Date FOC offered: 1/10/2023  Date FOC received:   Accepting facility:   Date authorization started with reference number:   Date authorization received and expires:   Follow up appointments: PCPSpecialist  DME needed: per facility  Transportation at discharge: likely BLS  IM/IMM Medicare/ letter given: will need 2nd IMM   Is patient a  and [facility] with [facility]?    If yes, was  transfer form completed and [facility] notified? Declined transfer  Caregiver Contact: Linda Meneses, wife, [phone]   Discharge Caregiver contacted prior to discharge? yes  Care Conference needed?   Barriers to discharge: placement and medical clearance    Updates on referrals:     [facility]- resent referral and left  for admissions- referral pending via [facility] - no beds available    [facility] accepted pending bed availability - will have a bed Weds or Thurs

## 2024-01-15 NOTE — PROGRESS NOTES
End of Shift Note    Bedside shift change report given to ANAM Perez (oncoming nurse) by Trena Chow RN (offgoing nurse).  Report included the following information Nurse Handoff Report, MAR, and Recent Results      Shift worked:  7p - 7a   Shift summary and any significant changes:    Frequent turns during the night.  No lactulose given at night per family.       Concerns for physician to address:  None   Zone phone for oncoming shift:   [phone]     Patient Information  Jone Meneses  83 y.o.  1/8/2024  2:40 PM by Jone Melchor MD. Jone Meneses was admitted from Home    Problem List  Patient Active Problem List    Diagnosis Date Noted    Palliative care by specialist 01/10/2024    Counseling regarding advance care planning and goals of care 01/10/2024    Need for emotional support 01/10/2024    Impaired cognitive ability 01/10/2024    Physical debility 01/10/2024    Polyarthralgia 01/09/2024    Calculus of gallbladder with biliary obstruction but without cholecystitis 11/04/2023    Primary osteoarthritis of right knee 08/19/2023    Venous insufficiency 04/23/2023    Reactive depression 12/29/2022    Physical deconditioning 12/09/2021    Neurogenic bladder 08/08/2021    Cerebral microvascular disease 05/28/2020    Ataxia 04/09/2020    Vitamin D deficiency 04/09/2020    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 disease) 09/29/2018    Memory deficit 09/24/2018    Dizziness  05/22/2018    Cardiomyopathy, ischemic 05/24/2015    Dyslipidemia 11/12/2014    Renal azotemia 11/12/2014    Prostatism 11/12/2014    HTN (hypertension) 11/12/2014    Ankylosing spondylitis (HCC) 11/12/2014     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 No Placement date  Reason Medically Necessary   PICC LINE? No Placement date Reason Medically Necessary     Genitourinary:   Urinary status: incontinent  Urinary Catheter? No Placement Date  Reason Medically Necessary     Respiratory:   O2 Device: None (Room air)  Chronic home O2 use?: no  Incentive spirometer at bedside: no       GI:  Last BM (including prior to admit): 01/13/24  Current diet:  ADULT DIET; Easy to Chew  Passing flatus: yes  Tolerating current diet: yes       Pain Management:   Patient states pain is manageable on current regimen: yes    Skin:  Cooper Scale Score: 15  Interventions: float heels, increase time out of bed, PT/OT consult, and internal/external urinary devices    Patient Safety:  Fall Score: [place] Total Score: 80  Interventions: bed/chair alarm, assistive devices (walker, cane, etc.), gripper socks, pt to call before getting OOB, and stay with me (per policy)     @Rollbelt  @dexterity to release roll belt  Yes/No ( must document dexterity  here by stating Yes or No here, otherwise this is a restraint and must follow restraint documentation policy.)    DVT prophylaxis:  DVT prophylaxis Med- yes  DVT prophylaxis SCD or LUCRETIA- no     Wounds: (If Applicable)  Wounds- no  Location

## 2024-01-16 LAB
ANION GAP SERPL CALC-SCNC: 3 MMOL/L (ref 5–15)
BUN SERPL-MCNC: 25 MG/DL (ref 6–20)
BUN/CREAT SERPL: 15 (ref 12–20)
CALCIUM SERPL-MCNC: 8.6 MG/DL (ref 8.5–10.1)
CHLORIDE SERPL-SCNC: 111 MMOL/L (ref 97–108)
CO2 SERPL-SCNC: 23 MMOL/L (ref 21–32)
CREAT SERPL-MCNC: 1.7 MG/DL (ref 0.7–1.3)
GLUCOSE SERPL-MCNC: 101 MG/DL (ref 65–100)
POTASSIUM SERPL-SCNC: 4.5 MMOL/L (ref 3.5–5.1)
SODIUM SERPL-SCNC: 137 MMOL/L (ref 136–145)

## 2024-01-16 PROCEDURE — 6360000002 HC RX W HCPCS: Performed by: INTERNAL MEDICINE

## 2024-01-16 PROCEDURE — 2580000003 HC RX 258: Performed by: GENERAL ACUTE CARE HOSPITAL

## 2024-01-16 PROCEDURE — 36415 COLL VENOUS BLD VENIPUNCTURE: CPT

## 2024-01-16 PROCEDURE — 6370000000 HC RX 637 (ALT 250 FOR IP): Performed by: INTERNAL MEDICINE

## 2024-01-16 PROCEDURE — 99221 1ST HOSP IP/OBS SF/LOW 40: CPT | Performed by: INTERNAL MEDICINE

## 2024-01-16 PROCEDURE — 80048 BASIC METABOLIC PNL TOTAL CA: CPT

## 2024-01-16 PROCEDURE — 1100000003 HC PRIVATE W/ TELEMETRY

## 2024-01-16 PROCEDURE — 6370000000 HC RX 637 (ALT 250 FOR IP): Performed by: GENERAL ACUTE CARE HOSPITAL

## 2024-01-16 RX ADMIN — MEMANTINE 10 MG: 10 TABLET ORAL at 20:47

## 2024-01-16 RX ADMIN — ACETAMINOPHEN 1000 MG: 500 TABLET ORAL at 09:51

## 2024-01-16 RX ADMIN — ENOXAPARIN SODIUM 40 MG: 100 INJECTION SUBCUTANEOUS at 09:04

## 2024-01-16 RX ADMIN — ACETAMINOPHEN 1000 MG: 500 TABLET ORAL at 22:59

## 2024-01-16 RX ADMIN — Medication 500 MG: at 20:45

## 2024-01-16 RX ADMIN — HYDROCORTISONE: 0.01 CREAM TOPICAL at 20:47

## 2024-01-16 RX ADMIN — HYDROCORTISONE: 0.01 CREAM TOPICAL at 09:05

## 2024-01-16 RX ADMIN — LACTULOSE 20 G: 20 SOLUTION ORAL at 20:47

## 2024-01-16 RX ADMIN — FLUOXETINE 10 MG: 10 CAPSULE ORAL at 09:02

## 2024-01-16 RX ADMIN — TAMSULOSIN HYDROCHLORIDE 0.4 MG: 0.4 CAPSULE ORAL at 09:02

## 2024-01-16 RX ADMIN — PRAVASTATIN SODIUM 80 MG: 40 TABLET ORAL at 20:46

## 2024-01-16 RX ADMIN — Medication 500 MG: at 09:02

## 2024-01-16 RX ADMIN — Medication: at 20:47

## 2024-01-16 RX ADMIN — FINASTERIDE 5 MG: 5 TABLET, FILM COATED ORAL at 09:02

## 2024-01-16 RX ADMIN — Medication: at 09:05

## 2024-01-16 RX ADMIN — SODIUM CHLORIDE, PRESERVATIVE FREE 10 ML: 5 INJECTION INTRAVENOUS at 20:48

## 2024-01-16 RX ADMIN — SODIUM CHLORIDE, PRESERVATIVE FREE 10 ML: 5 INJECTION INTRAVENOUS at 09:03

## 2024-01-16 RX ADMIN — PREDNISONE 10 MG: 5 TABLET ORAL at 09:02

## 2024-01-16 RX ADMIN — MEMANTINE 10 MG: 10 TABLET ORAL at 09:02

## 2024-01-16 RX ADMIN — ACETAMINOPHEN 1000 MG: 500 TABLET ORAL at 15:50

## 2024-01-16 RX ADMIN — ASPIRIN 81 MG: 81 TABLET, COATED ORAL at 09:02

## 2024-01-16 RX ADMIN — LACTULOSE 20 G: 20 SOLUTION ORAL at 15:50

## 2024-01-16 ASSESSMENT — PAIN SCALES - GENERAL
PAINLEVEL_OUTOF10: 2
PAINLEVEL_OUTOF10: 0
PAINLEVEL_OUTOF10: 0
PAINLEVEL_OUTOF10: 4

## 2024-01-16 ASSESSMENT — PAIN DESCRIPTION - DESCRIPTORS
DESCRIPTORS: ACHING
DESCRIPTORS: ACHING

## 2024-01-16 ASSESSMENT — PAIN DESCRIPTION - LOCATION
LOCATION: OTHER (COMMENT)
LOCATION: ABDOMEN

## 2024-01-16 NOTE — PROGRESS NOTES
General Daily Progress Note    Admit Date: 1/8/2024    Subjective:     Patient feels better    Current Facility-Administered Medications   Medication Dose Route Frequency    predniSONE (DELTASONE) tablet 10 mg  10 mg Oral Daily    bisacodyl (DULCOLAX) EC tablet 10 mg  10 mg Oral Daily PRN    lactulose (CHRONULAC) 10 GM/15ML solution 20 g  20 g Oral TID    balsum peru-castor oil (VENELEX) ointment   Topical BID    salsalate (DISALCID) tablet 500 mg (Patient Supplied)  500 mg Oral BID    enoxaparin (LOVENOX) injection 40 mg  40 mg SubCUTAneous Daily    hydrocortisone 1 % cream   Topical BID    finasteride (PROSCAR) tablet 5 mg  5 mg Oral Daily    tamsulosin (FLOMAX) capsule 0.4 mg  0.4 mg Oral Daily    acetaminophen (TYLENOL) tablet 1,000 mg  1,000 mg Oral q8h    aspirin EC tablet 81 mg  81 mg Oral Daily    FLUoxetine (PROZAC) capsule 10 mg  10 mg Oral Daily    memantine (NAMENDA) tablet 10 mg  10 mg Oral BID    pravastatin (PRAVACHOL) tablet 80 mg  80 mg Oral QHS    sodium chloride flush 0.9 % injection 5-40 mL  5-40 mL IntraVENous 2 times per day    sodium chloride flush 0.9 % injection 5-40 mL  5-40 mL IntraVENous PRN    0.9 % sodium chloride infusion   IntraVENous PRN    ondansetron (ZOFRAN-ODT) disintegrating tablet 4 mg  4 mg Oral Q8H PRN    Or    ondansetron (ZOFRAN) injection 4 mg  4 mg IntraVENous Q6H PRN    polyethylene glycol (GLYCOLAX) packet 17 g  17 g Oral Daily PRN        Review of Systems  Review of systems not obtained due to patient factors.    Objective:     Patient Vitals for the past 24 hrs:   BP Temp Temp src Pulse Resp SpO2   01/16/24 1533 119/75 97.7 °F (36.5 °C) Oral 76 17 99 %   01/16/24 0824 127/76 97.5 °F (36.4 °C) -- 77 18 97 %   01/15/24 2321 127/77 98.2 °F (36.8 °C) -- 70 -- 96 %   01/15/24 1935 120/82 97.9 °F (36.6 °C) Oral 70 18 --       01/16 0701 - 01/16 1900  In: -   Out: 750 [Urine:750]  01/14 1901 - 01/16 0700  In: 100 [P.O.:100]  Out: 120 [Urine:120]    Physical Exam: /75    Pulse 76   Temp 97.7 °F (36.5 °C) (Oral)   Resp 17   Ht 1.829 m (6')   Wt 80.3 kg (177 lb 0.5 oz)   SpO2 99%   BMI 24.01 kg/m²   General appearance: alert, appears stated age, cooperative, and confused  Neck: no adenopathy, no carotid bruit, no JVD, supple, symmetrical, trachea midline, and thyroid not enlarged, symmetric, no tenderness/mass/nodules  Lungs: clear to auscultation bilaterally  Heart: regular rate and rhythm, S1, S2 normal, no murmur, click, rub or gallop  Abdomen: soft, non-tender; bowel sounds normal; no masses,  no organomegaly  Extremities:  Pain elicited to range of motion knees, shoulders  Neurologic: Physiologic except for mentation    Assessment:     Principal Problem:    Polyarthralgia  Patient has significant joint pain with a paucity of findings suggesting inflammation.  In spite of that I will give him a short course of prednisone.  This appears to be effective at reducing joint.  No adverse effects thus far.Will reduce steroids.  Active Problems:    CKD (chronic kidney disease) stage 3, GFR 30-59 ml/min ([facility])  Patient has a history of chronic renal failure which has been reasonably stable over the years.  Slight worsening noted from a prerenal component from his poor p.o. intake.    Physical deconditioning  Severe physical deconditioning because of inability of the patient to adequately mobilize himself.    Alzheimer's type dementia with late onset without behavioral disturbance ([facility])  Dementia is getting worse as would be expected.  Significant reduction in p.o. intake which is a bad prognostic sign.  Resolved Problems:      Sepsis ([facility])  No evidence of an overt infection thus far.       Palliative care  Await input from patient's wife.  Disposition pending possibly to rehab in a nursing facility.  Awaiting bed.    Plan:

## 2024-01-16 NOTE — PLAN OF CARE
within 7 day(s).  4.  Patient will ambulate with moderate assistance for 10 feet with the least restrictive device within 7 day(s).       1/15/2024 1036 by Marie Martinez, PT  Outcome: Progressing     Problem: Skin/Tissue Integrity  Goal: Absence of new skin breakdown  Description: 1.  Monitor for areas of redness and/or skin breakdown  2.  Assess vascular access sites hourly  3.  Every 4-6 hours minimum:  Change oxygen saturation probe site  4.  Every 4-6 hours:  If on nasal continuous positive airway pressure, respiratory therapy assess nares and determine need for appliance change or resting period.  Outcome: Progressing     Problem: ABCDS Injury Assessment  Goal: Absence of physical injury  Outcome: Progressing     Problem: Occupational Therapy - Adult  Goal: By Discharge: Performs self-care activities at highest level of function for planned discharge setting.  See evaluation for individualized goals.  Description: FUNCTIONAL STATUS PRIOR TO ADMISSION:  Pt has a history of Alzheimers but was ambulating with rollator walker and one assist, wife was getting in the shower with pt 1x wk to assist with bathing, recent difficult with pt stepping over lip of shower, family placed a RTS over toilet but pt was still unable to get up off of commode without assist, pt was able to assist with straightening shirt over body but family otherwise dresses pt, pt was able to cut food and self feed with utensils but most recently pt has been finger feeding and unable to use utensils, sudden decline in overall mobility over the past 4-5 days and pt was bed bound just prior to admit    HOME SUPPORT: Patient lived elderly wife and pts daughter moved in with her parents to assist and is very involved in pts care.    Occupational Therapy Goals:  Initiated 1/9/2024  1.  Patient will perform grooming with Stand by Assist within 7 day(s).  2.  Patient will perform self-feeding with Supervision within 7 day(s).  3.  Patient will  perform upper body dressing with Moderate Assist within 7 day(s).  4.  Patient will perform sit to stand with moderate assist in prep for ADL transfers within 7 day(s).  5.  Patient will perform upper body bathing with SBA seated with good balance within 7 day(s).        1/15/2024 1543 by Chana Lyons, OTR/L  Outcome: Progressing     Problem: Neurosensory - Adult  Goal: Achieves stable or improved neurological status  Outcome: Progressing     Problem: Cardiovascular - Adult  Goal: Maintains optimal cardiac output and hemodynamic stability  Outcome: Progressing  Goal: Absence of cardiac dysrhythmias or at baseline  Outcome: Progressing     Problem: Skin/Tissue Integrity - Adult  Goal: Skin integrity remains intact  Outcome: Progressing  Goal: Incisions, wounds, or drain sites healing without S/S of infection  Outcome: Progressing

## 2024-01-16 NOTE — CARE COORDINATION
Transition of Care Plan:     RUR:  17%  Prior Level of Functioning: needs some assistance  Disposition: SNF  If SNF or IPR: Date FOC offered: 1/10/2023  Date FOC received: 01/10/23  Accepting facility: Referrals pending  Date authorization started with reference number:   Date authorization received and expires:   Follow up appointments: defer to SNF  DME needed: defer to SNF  Transportation at discharge: BLS likely needed  IM/IMM Medicare/ letter given: 2nd needed prior to d/c  Is patient a [place] and connected with [facility]? Yes              If yes, was [place] transfer form completed and [facility] notified? Declined transfer  Caregiver Contact: Linda Meneses, wife, [phone]   Discharge Caregiver contacted prior to discharge? yes  Care Conference needed? Not at this time  Barriers to discharge: Bed availability    0894 - CM left message with kP Lyons requesting update on referral. CM will discuss planning for [facility] tomorrow vs Thursday.     1427 - [facility] has declined, CM inquiring when [facility] can accept. [facility] cannot accept until Friday. They have placed pt on admission list. CM will discuss with family.     1602 - Family updated, agreeable to [facility] Friday.    CORI Pablo  Care Management

## 2024-01-16 NOTE — PROGRESS NOTES
General Daily Progress Note    Admit Date: 1/8/2024    Subjective:     Patient feels better    Current Facility-Administered Medications   Medication Dose Route Frequency    predniSONE (DELTASONE) tablet 10 mg  10 mg Oral Daily    bisacodyl (DULCOLAX) EC tablet 10 mg  10 mg Oral Daily PRN    lactulose (CHRONULAC) 10 GM/15ML solution 20 g  20 g Oral TID    balsum peru-castor oil (VENELEX) ointment   Topical BID    salsalate (DISALCID) tablet 500 mg  500 mg Oral BID    enoxaparin (LOVENOX) injection 40 mg  40 mg SubCUTAneous Daily    hydrocortisone 1 % cream   Topical BID    finasteride (PROSCAR) tablet 5 mg  5 mg Oral Daily    tamsulosin (FLOMAX) capsule 0.4 mg  0.4 mg Oral Daily    acetaminophen (TYLENOL) tablet 1,000 mg  1,000 mg Oral q8h    aspirin EC tablet 81 mg  81 mg Oral Daily    FLUoxetine (PROZAC) capsule 10 mg  10 mg Oral Daily    memantine (NAMENDA) tablet 10 mg  10 mg Oral BID    pravastatin (PRAVACHOL) tablet 80 mg  80 mg Oral QHS    sodium chloride flush 0.9 % injection 5-40 mL  5-40 mL IntraVENous 2 times per day    sodium chloride flush 0.9 % injection 5-40 mL  5-40 mL IntraVENous PRN    0.9 % sodium chloride infusion   IntraVENous PRN    ondansetron (ZOFRAN-ODT) disintegrating tablet 4 mg  4 mg Oral Q8H PRN    Or    ondansetron (ZOFRAN) injection 4 mg  4 mg IntraVENous Q6H PRN    polyethylene glycol (GLYCOLAX) packet 17 g  17 g Oral Daily PRN        Review of Systems  Review of systems not obtained due to patient factors.    Objective:     Patient Vitals for the past 24 hrs:   BP Temp Temp src Pulse Resp SpO2   01/15/24 2321 127/77 98.2 °F (36.8 °C) -- 70 -- 96 %   01/15/24 1935 120/82 97.9 °F (36.6 °C) Oral 70 18 --   01/15/24 1540 119/78 97.9 °F (36.6 °C) -- 94 14 99 %   01/15/24 0858 130/80 97.9 °F (36.6 °C) Oral 72 16 98 %       No intake/output data recorded.  No intake/output data recorded.    Physical Exam: /77   Pulse 70   Temp 98.2 °F (36.8 °C)   Resp 18   Ht 1.829 m (6')   Wt

## 2024-01-16 NOTE — PLAN OF CARE
Problem: Safety - Adult  Goal: Free from fall injury  1/15/2024 1932 by Ana Khan, RN  Flowsheets (Taken 1/15/2024 1932)  Free From Fall Injury:   Instruct family/caregiver on patient safety   Based on caregiver fall risk screen, instruct family/caregiver to ask for assistance with transferring infant if caregiver noted to have fall risk factors  1/15/2024 1839 by Ana Khan, RN  Flowsheets (Taken 1/15/2024 1839)  Free From Fall Injury:   Instruct family/caregiver on patient safety   Based on caregiver fall risk screen, instruct family/caregiver to ask for assistance with transferring infant if caregiver noted to have fall risk factors

## 2024-01-16 NOTE — PROGRESS NOTES
End of Shift Note    Bedside shift change report given to  , RN (oncoming nurse) by DELMIS ALSTON RN (offgoing nurse).  Report included the following information Nurse Handoff Report, MAR, and Recent Results      Shift worked:  days   Shift summary and any significant changes:    Refused Lactulose       Concerns for physician to address:  none   Zone phone for oncoming shift:   [phone]     Patient Information  Jone Meneses  83 y.o.  1/8/2024  2:40 PM by Jone Melchor MD. Jone Meneses was admitted from Home    Problem List  Patient Active Problem List    Diagnosis Date Noted    Adult failure to thrive 01/15/2024    DNR (do not resuscitate) discussion 01/15/2024    Advance care planning 01/15/2024    Palliative care by specialist 01/10/2024    Counseling regarding advance care planning and goals of care 01/10/2024    Need for emotional support 01/10/2024    Impaired cognitive ability 01/10/2024    Physical debility 01/10/2024    Polyarthralgia 01/09/2024    Calculus of gallbladder with biliary obstruction but without cholecystitis 11/04/2023    Primary osteoarthritis of right knee 08/19/2023    Venous insufficiency 04/23/2023    Reactive depression 12/29/2022    Physical deconditioning 12/09/2021    Neurogenic bladder 08/08/2021    Cerebral microvascular disease 05/28/2020    Ataxia 04/09/2020    Vitamin D deficiency 04/09/2020    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

## 2024-01-16 NOTE — PROGRESS NOTES
Comprehensive Nutrition Assessment    Type and Reason for Visit:  Initial, RD Nutrition Re-Screen/LOS    Nutrition Recommendations/Plan:   Continue current diet      Malnutrition Assessment:  Malnutrition Status:  Insufficient data (01/16/24 1411)      Nutrition Assessment:    Patient medically noted for polyarthralgia. PMH CKD and dementia. Chart reviewed for length of stay. Patient sleeping soundly at time of visit. Receiving an easy to chew diet. No documented PO intake in flowsheets. No significant weight changes noted on chart review. Discharge planning underway; placement pending. Continue current diet and encourage intake of meals. Document %PO intake in flowsheets.     Nutrition Related Findings:    K+ 4.5, , Phos 2.2   Prozac, Lactulose, namenda, Pravastatin, Prednisone   Wound Type: None       Current Nutrition Intake & Therapies:    Average Meal Intake: Unable to assess  Average Supplements Intake: None Ordered  ADULT DIET; Easy to Chew; no pork    Anthropometric Measures:  Height: 182.9 cm (6')  Ideal Body Weight (IBW): 178 lbs (81 kg)       Current Body Weight: 80.3 kg (177 lb 0.5 oz),   IBW.    Current BMI (kg/m2): 24                          BMI Categories: Normal Weight (BMI 18.5-24.9)    Estimated Daily Nutrient Needs:  Energy Requirements Based On: Formula  Weight Used for Energy Requirements: Current  Energy (kcal/day): 1984 kcals (BMR x 1.3AF)  Weight Used for Protein Requirements: Current  Protein (g/day): 80-96g (1.0-1.2 g/kg bw)  Method Used for Fluid Requirements: 1 ml/kcal  Fluid (ml/day): 2000 mL    Nutrition Diagnosis:   No nutrition diagnosis at this time      Nutrition Interventions:   Food and/or Nutrient Delivery: Continue Current Diet  Nutrition Education/Counseling: No recommendation at this time  Coordination of Nutrition Care: Continue to monitor while inpatient       Goals:     Goals: PO intake 75% or greater, by next RD assessment       Nutrition Monitoring and Evaluation:    Behavioral-Environmental Outcomes: None Identified  Food/Nutrient Intake Outcomes: Food and Nutrient Intake  Physical Signs/Symptoms Outcomes: Biochemical Data, Nutrition Focused Physical Findings, Weight    Discharge Planning:    Continue current diet     Pat Hargrove RD  Contact: ext [phone]

## 2024-01-17 PROCEDURE — 99221 1ST HOSP IP/OBS SF/LOW 40: CPT | Performed by: INTERNAL MEDICINE

## 2024-01-17 PROCEDURE — 97535 SELF CARE MNGMENT TRAINING: CPT | Performed by: OCCUPATIONAL THERAPIST

## 2024-01-17 PROCEDURE — 97116 GAIT TRAINING THERAPY: CPT

## 2024-01-17 PROCEDURE — 1100000003 HC PRIVATE W/ TELEMETRY

## 2024-01-17 PROCEDURE — 6370000000 HC RX 637 (ALT 250 FOR IP): Performed by: INTERNAL MEDICINE

## 2024-01-17 PROCEDURE — 6360000002 HC RX W HCPCS: Performed by: INTERNAL MEDICINE

## 2024-01-17 PROCEDURE — 6370000000 HC RX 637 (ALT 250 FOR IP): Performed by: GENERAL ACUTE CARE HOSPITAL

## 2024-01-17 PROCEDURE — 2580000003 HC RX 258: Performed by: GENERAL ACUTE CARE HOSPITAL

## 2024-01-17 RX ORDER — PREDNISONE 5 MG/1
10 TABLET ORAL 2 TIMES DAILY
Status: DISCONTINUED | OUTPATIENT
Start: 2024-01-17 | End: 2024-01-18

## 2024-01-17 RX ADMIN — ENOXAPARIN SODIUM 40 MG: 100 INJECTION SUBCUTANEOUS at 10:27

## 2024-01-17 RX ADMIN — ACETAMINOPHEN 1000 MG: 500 TABLET ORAL at 16:02

## 2024-01-17 RX ADMIN — LACTULOSE 20 G: 20 SOLUTION ORAL at 16:01

## 2024-01-17 RX ADMIN — PRAVASTATIN SODIUM 80 MG: 40 TABLET ORAL at 20:49

## 2024-01-17 RX ADMIN — FLUOXETINE 10 MG: 10 CAPSULE ORAL at 10:27

## 2024-01-17 RX ADMIN — LACTULOSE 20 G: 20 SOLUTION ORAL at 20:57

## 2024-01-17 RX ADMIN — HYDROCORTISONE: 0.01 CREAM TOPICAL at 10:33

## 2024-01-17 RX ADMIN — TAMSULOSIN HYDROCHLORIDE 0.4 MG: 0.4 CAPSULE ORAL at 10:27

## 2024-01-17 RX ADMIN — SODIUM CHLORIDE, PRESERVATIVE FREE 10 ML: 5 INJECTION INTRAVENOUS at 20:57

## 2024-01-17 RX ADMIN — FINASTERIDE 5 MG: 5 TABLET, FILM COATED ORAL at 10:27

## 2024-01-17 RX ADMIN — Medication: at 20:50

## 2024-01-17 RX ADMIN — HYDROCORTISONE: 0.01 CREAM TOPICAL at 20:51

## 2024-01-17 RX ADMIN — Medication 500 MG: at 10:29

## 2024-01-17 RX ADMIN — PREDNISONE 10 MG: 5 TABLET ORAL at 20:49

## 2024-01-17 RX ADMIN — MEMANTINE 10 MG: 10 TABLET ORAL at 20:49

## 2024-01-17 RX ADMIN — Medication: at 10:33

## 2024-01-17 RX ADMIN — Medication 500 MG: at 20:50

## 2024-01-17 RX ADMIN — PREDNISONE 10 MG: 5 TABLET ORAL at 10:27

## 2024-01-17 RX ADMIN — ACETAMINOPHEN 1000 MG: 500 TABLET ORAL at 23:02

## 2024-01-17 RX ADMIN — ACETAMINOPHEN 1000 MG: 500 TABLET ORAL at 05:45

## 2024-01-17 RX ADMIN — MEMANTINE 10 MG: 10 TABLET ORAL at 10:27

## 2024-01-17 RX ADMIN — LACTULOSE 20 G: 20 SOLUTION ORAL at 10:27

## 2024-01-17 RX ADMIN — ASPIRIN 81 MG: 81 TABLET, COATED ORAL at 10:27

## 2024-01-17 ASSESSMENT — PAIN SCALES - GENERAL
PAINLEVEL_OUTOF10: 8
PAINLEVEL_OUTOF10: 0
PAINLEVEL_OUTOF10: 8
PAINLEVEL_OUTOF10: 0
PAINLEVEL_OUTOF10: 6
PAINLEVEL_OUTOF10: 3

## 2024-01-17 ASSESSMENT — PAIN DESCRIPTION - DESCRIPTORS
DESCRIPTORS: ACHING

## 2024-01-17 ASSESSMENT — PAIN DESCRIPTION - LOCATION
LOCATION: BACK
LOCATION: ABDOMEN

## 2024-01-17 ASSESSMENT — PAIN DESCRIPTION - ORIENTATION: ORIENTATION: MID

## 2024-01-17 NOTE — PLAN OF CARE
Problem: Physical Therapy - Adult  Goal: By Discharge: Performs mobility at highest level of function for planned discharge setting.  See evaluation for individualized goals.  Description: FUNCTIONAL STATUS PRIOR TO ADMISSION: Pt lives with his wife and dtr. Dtr present and very supportive. Pt with hx of Alzheimers but had been able to amb with the rollator with someone walking with him and had help with dressing and bathing, getting into shower once a week with wife assist but dtr states this was very difficult. Dtr states that pt had a sudden decline and has not really been able to amb for 4-5 days.    HOME SUPPORT PRIOR TO ADMISSION: The patient lived with wife and dtr, see above.    Physical Therapy Goals  Initiated 1/9/2024  1.  Patient will move from supine to sit and sit to supine, scoot up and down, and roll side to side in bed with moderate assistance within 7 day(s).    2.  Patient will perform sit to stand with moderate assistance within 7 day(s).  3.  Patient will transfer from bed to chair and chair to bed with moderate assistance using the least restrictive device within 7 day(s).  4.  Patient will ambulate with moderate assistance for 10 feet with the least restrictive device within 7 day(s).     Physical Therapy Goals  Revised 1/17/2024  1.  Patient will move from supine to sit and sit to supine  in bed with supervision/set-up within 7 day(s).  2.  Patient will transfer from bed to chair and chair to bed with minimal assistance/contact guard assist using the least restrictive device within 7 day(s).  3.  Patient will perform sit to stand with minimal assistance/contact guard assist within 7 day(s).  4.  Patient will ambulate with minimal assistance/contact guard assist for 45 feet with the least restrictive device within 7 day(s).             Outcome: Progressing    PHYSICAL THERAPY TREATMENT: WEEKLY REASSESSMENT    Patient: Jone Meneses (83 y.o. male)  Date: 1/17/2024  Primary Diagnosis: Adult

## 2024-01-17 NOTE — PROGRESS NOTES
End of Shift Note     Bedside shift change report given to ANAM Molina (oncoming nurse) by Raven Cheng RN (offgoing nurse).  Report included the following information Nurse Handoff Report, MAR, and Recent Results        Shift worked:  Days   Shift summary and any significant changes:     Q2 turns   Patient up to bedside commode and chair throughout shift          Concerns for physician to address:  none   Zone phone for oncoming shift:   [phone]      Patient Information  Jone Meneses  83 y.o.  1/8/2024  2:40 PM by Jone Melchor MD. Jone Meneses was admitted from Home     Problem List       Patient Active Problem List     Diagnosis Date Noted    Adult failure to thrive 01/15/2024    DNR (do not resuscitate) discussion 01/15/2024    Advance care planning 01/15/2024    Palliative care by specialist 01/10/2024    Counseling regarding advance care planning and goals of care 01/10/2024    Need for emotional support 01/10/2024    Impaired cognitive ability 01/10/2024    Physical debility 01/10/2024    Polyarthralgia 01/09/2024    Calculus of gallbladder with biliary obstruction but without cholecystitis 11/04/2023    Primary osteoarthritis of right knee 08/19/2023    Venous insufficiency 04/23/2023    Reactive depression 12/29/2022    Physical deconditioning 12/09/2021    Neurogenic bladder 08/08/2021    Cerebral microvascular disease 05/28/2020    Ataxia 04/09/2020    Vitamin D deficiency 04/09/2020    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 04/09/2020    Degenerative cervical spinal stenosis 04/09/2020    Bilateral carotid artery stenosis 04/09/2020    B12 deficiency 04/09/2020    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    Alzheimer's type dementia with late onset without behavioral disturbance (HCC) 04/09/2020    Degenerative lumbar spinal stenosis 04/09/2020    Cervical spondylosis 01/15/2020    Urinary urgency 01/15/2020    Unsteady gait 01/15/2020    CKD (chronic kidney disease) stage 3,  GFR 30-59 ml/min ([facility]) 10/29/2019    ASHD (arteriosclerotic heart disease) 09/29/2018    Memory deficit 09/24/2018    Dizziness 05/22/2018    Cardiomyopathy, ischemic 05/24/2015    Dyslipidemia 11/12/2014    Renal azotemia 11/12/2014    Prostatism 11/12/2014    HTN (hypertension) 11/12/2014    Ankylosing spondylitis ([facility]) 11/12/2014      Past Medical History        Past Medical History:   Diagnosis Date    Arthritis       generalized    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 No Placement date  Reason Medically Necessary   PICC LINE? No Placement date Reason Medically Necessary      Genitourinary:   Urinary status: incontinent  Urinary Catheter? No Placement Date  Reason Medically Necessary      Respiratory:   O2 Device: None (Room air)  Chronic home O2 use?: no  Incentive spirometer at bedside: no     GI:  Last BM (including prior to admit): 01/15/24  Current diet:  ADULT DIET; Easy to Chew; no pork  Passing flatus: yes  Tolerating current diet: yes     Pain Management:   Patient states pain is manageable on current regimen: yes     Skin:  Cooper Scale Score: 16  Interventions: float heels, increase time out of bed, PT/OT consult, and internal/external urinary devices    Patient Safety:  Fall Score: Randhawa Total Score: 80  Interventions: bed/chair alarm, assistive devices (walker, cane, etc.), gripper socks, pt to call before getting OOB, and stay with me (per policy)  @Rollbelt  @dexterity to release roll belt  Yes/No ( must document dexterity  here by stating Yes or No here, otherwise this is a restraint

## 2024-01-17 NOTE — PROGRESS NOTES
End of Shift Note    Bedside shift change report given to ANAM Carbajal (oncoming nurse) by Tracy Garsia RN (offgoing nurse).  Report included the following information Nurse Handoff Report, MAR, and Recent Results      Shift worked:  nightgs   Shift summary and any significant changes:    No acute changes overnight   Awaiting discharge  Venelex applied   Q2 turns       Concerns for physician to address:  none   Zone phone for oncoming shift:   [phone]     Patient Information  Jone Meneses  83 y.o.  1/8/2024  2:40 PM by Jone Melchor MD. Jone Meneses was admitted from Home    Problem List  Patient Active Problem List    Diagnosis Date Noted    Adult failure to thrive 01/15/2024    DNR (do not resuscitate) discussion 01/15/2024    Advance care planning 01/15/2024    Palliative care by specialist 01/10/2024    Counseling regarding advance care planning and goals of care 01/10/2024    Need for emotional support 01/10/2024    Impaired cognitive ability 01/10/2024    Physical debility 01/10/2024    Polyarthralgia 01/09/2024    Calculus of gallbladder with biliary obstruction but without cholecystitis 11/04/2023    Primary osteoarthritis of right knee 08/19/2023    Venous insufficiency 04/23/2023    Reactive depression 12/29/2022    Physical deconditioning 12/09/2021    Neurogenic bladder 08/08/2021    Cerebral microvascular disease 05/28/2020    Ataxia 04/09/2020    Vitamin D deficiency 04/09/2020    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 04/09/2020    Degenerative cervical spinal stenosis 04/09/2020    Bilateral carotid artery stenosis 04/09/2020    B12 deficiency 04/09/2020    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

## 2024-01-17 NOTE — PLAN OF CARE
Problem: Discharge Planning  Goal: Discharge to home or other facility with appropriate resources  Outcome: Progressing     Problem: Safety - Adult  Goal: Free from fall injury  1/16/2024 2140 by Tracy Garsia, RN  Outcome: Progressing  1/16/2024 1947 by Ana Khan RN  Flowsheets (Taken 1/16/2024 1947)  Free From Fall Injury:   Instruct family/caregiver on patient safety   Based on caregiver fall risk screen, instruct family/caregiver to ask for assistance with transferring infant if caregiver noted to have fall risk factors     Problem: Skin/Tissue Integrity  Goal: Absence of new skin breakdown  Description: 1.  Monitor for areas of redness and/or skin breakdown  2.  Assess vascular access sites hourly  3.  Every 4-6 hours minimum:  Change oxygen saturation probe site  4.  Every 4-6 hours:  If on nasal continuous positive airway pressure, respiratory therapy assess nares and determine need for appliance change or resting period.  Outcome: Progressing     Problem: ABCDS Injury Assessment  Goal: Absence of physical injury  Outcome: Progressing     Problem: Neurosensory - Adult  Goal: Achieves stable or improved neurological status  Outcome: Progressing  Goal: Achieves maximal functionality and self care  Outcome: Progressing     Problem: Cardiovascular - Adult  Goal: Maintains optimal cardiac output and hemodynamic stability  Outcome: Progressing  Goal: Absence of cardiac dysrhythmias or at baseline  Outcome: Progressing     Problem: Skin/Tissue Integrity - Adult  Goal: Skin integrity remains intact  Outcome: Progressing  Flowsheets  Taken 1/16/2024 2138  Skin Integrity Remains Intact: Monitor for areas of redness and/or skin breakdown  Taken 1/16/2024 2132  Skin Integrity Remains Intact: Monitor for areas of redness and/or skin breakdown  Goal: Incisions, wounds, or drain sites healing without S/S of infection  Outcome: Progressing  Flowsheets  Taken 1/16/2024 2138  Incisions, Wounds, or Drain Sites

## 2024-01-17 NOTE — PLAN OF CARE
Problem: Safety - Adult  Goal: Free from fall injury  Flowsheets (Taken 1/16/2024 1947)  Free From Fall Injury:   Instruct family/caregiver on patient safety   Based on caregiver fall risk screen, instruct family/caregiver to ask for assistance with transferring infant if caregiver noted to have fall risk factors

## 2024-01-17 NOTE — CARE COORDINATION
Transition of Care Plan:     RUR:  17%  Prior Level of Functioning: needs some assistance  Disposition: SNF  If SNF or IPR: Date FOC offered: 1/10/2023  Date FOC received: 01/10/23  Accepting facility: [facility]  Date authorization started with reference number:   Date authorization received and expires:   Follow up appointments: defer to SNF  DME needed: defer to SNF  Transportation at discharge: BLS needed  IM/IMM Medicare/ letter given: 2nd needed prior to d/c  Is patient a [place] and connected with VA? Yes              If yes, was [place] transfer form completed and VA notified? Declined transfer  Caregiver Contact: Linda Meneses, wife, [phone]   Discharge Caregiver contacted prior to discharge? yes  Care Conference needed? Not at this time  Barriers to discharge: Bed availability    0853 - Planning d/c to [facility] on 01/19. CM continuing to follow for additional needs.     CORI Pablo  Care Management

## 2024-01-17 NOTE — CARE COORDINATION
Medicare 3 night in-pt stay requirement for SNF met 1/11/24.    CORI Law  [facility]   [phone]

## 2024-01-17 NOTE — PLAN OF CARE
Problem: Occupational Therapy - Adult  Goal: By Discharge: Performs self-care activities at highest level of function for planned discharge setting.  See evaluation for individualized goals.  Description: FUNCTIONAL STATUS PRIOR TO ADMISSION:  Pt has a history of Alzheimers but was ambulating with rollator walker and one assist, wife was getting in the shower with pt 1x wk to assist with bathing, recent difficult with pt stepping over lip of shower, family placed a RTS over toilet but pt was still unable to get up off of commode without assist, pt was able to assist with straightening shirt over body but family otherwise dresses pt, pt was able to cut food and self feed with utensils but most recently pt has been finger feeding and unable to use utensils, sudden decline in overall mobility over the past 4-5 days and pt was bed bound just prior to admit    HOME SUPPORT: Patient lived elderly wife and pts daughter moved in with her parents to assist and is very involved in pts care.    Occupational Therapy Goals:  Weekly Re-assessment 1/17/2024   Initiated 1/9/2024  1.  Patient will perform grooming with Stand by Assist within 7 day(s).  2.  Patient will perform self-feeding with Supervision within 7 day(s).  3.  Patient will perform upper body dressing with Moderate Assist within 7 day(s). Met upgrade to min assist  4.  Patient will perform sit to stand with moderate assist in prep for ADL transfers within 7 day(s). Met 1/17/2024 upgrade to toilet transfer with RW and min assist  5.  Patient will perform upper body bathing with SBA seated with good balance within 7 day(s).        Outcome: Not Progressing   OCCUPATIONAL THERAPY RE-EVALUATION  Patient: Jone Meneses (83 y.o. male)  Date: 1/17/2024  Primary Diagnosis: Adult failure to thrive [R62.7]  Sepsis (HCC) [A41.9]  Ambulatory dysfunction [R26.2]         Precautions: Fall Risk, Bed Alarm                Chart, occupational therapy assessment, plan of care, and  F.l., Barthel DBellaW. (1965). Functional evaluation: the Barthel Index. Md [facility] Med J 142.  MILAGRO VenegasF, KAREN Jefferson., Mac, JBellaA., William, DAMION. (1999). Measuring the change indisability after inpatient rehabilitation; comparison of the responsiveness of the Barthel Index and Functional [place] Measure. Journal of Neurology, Neurosurgery, and Psychiatry, 66(4), 480-484.  CHIN Cast, JUDIE Sutherland, & FREYA Rowan (2004.) Assessment of post-stroke quality of life in cost-effectiveness studies: The usefulness of the Barthel Index and the EuroQoL-5D. Quality of Life Research, 13, 427-43       Activity Tolerance:   Fair  and requires rest breaks  Please refer to the flowsheet for vital signs taken during this treatment.    After treatment:   Patient left in no apparent distress sitting up in chair, Call bell within reach, Bed/ chair alarm activated, and Caregiver / family present    COMMUNICATION/EDUCATION:   The patient's plan of care was discussed with: physical therapist, registered nurse, and pts wife    Patient Education  Education Given To: Patient;Family  Education Provided: Role of Therapy;Plan of Care  Education Method: Verbal  Barriers to Learning: None;Cognition  Education Outcome: Verbalized understanding;Continued education needed    Thank you for this referral.  Chana Lyons OTR/L  Minutes: 15

## 2024-01-18 LAB — ERYTHROCYTE [SEDIMENTATION RATE] IN BLOOD: 10 MM/HR (ref 0–20)

## 2024-01-18 PROCEDURE — 6370000000 HC RX 637 (ALT 250 FOR IP): Performed by: INTERNAL MEDICINE

## 2024-01-18 PROCEDURE — 99221 1ST HOSP IP/OBS SF/LOW 40: CPT | Performed by: INTERNAL MEDICINE

## 2024-01-18 PROCEDURE — 92526 ORAL FUNCTION THERAPY: CPT

## 2024-01-18 PROCEDURE — 1100000003 HC PRIVATE W/ TELEMETRY

## 2024-01-18 PROCEDURE — 6360000002 HC RX W HCPCS: Performed by: INTERNAL MEDICINE

## 2024-01-18 PROCEDURE — 6370000000 HC RX 637 (ALT 250 FOR IP): Performed by: GENERAL ACUTE CARE HOSPITAL

## 2024-01-18 PROCEDURE — 2580000003 HC RX 258: Performed by: GENERAL ACUTE CARE HOSPITAL

## 2024-01-18 PROCEDURE — 85652 RBC SED RATE AUTOMATED: CPT

## 2024-01-18 PROCEDURE — 36415 COLL VENOUS BLD VENIPUNCTURE: CPT

## 2024-01-18 RX ORDER — PREDNISONE 5 MG/1
15 TABLET ORAL DAILY
Status: DISCONTINUED | OUTPATIENT
Start: 2024-01-19 | End: 2024-01-19 | Stop reason: HOSPADM

## 2024-01-18 RX ADMIN — SODIUM CHLORIDE, PRESERVATIVE FREE 10 ML: 5 INJECTION INTRAVENOUS at 10:40

## 2024-01-18 RX ADMIN — PREDNISONE 10 MG: 5 TABLET ORAL at 19:51

## 2024-01-18 RX ADMIN — ENOXAPARIN SODIUM 40 MG: 100 INJECTION SUBCUTANEOUS at 08:31

## 2024-01-18 RX ADMIN — FLUOXETINE 10 MG: 10 CAPSULE ORAL at 08:32

## 2024-01-18 RX ADMIN — ASPIRIN 81 MG: 81 TABLET, COATED ORAL at 08:31

## 2024-01-18 RX ADMIN — SODIUM CHLORIDE, PRESERVATIVE FREE 10 ML: 5 INJECTION INTRAVENOUS at 19:54

## 2024-01-18 RX ADMIN — LACTULOSE 20 G: 20 SOLUTION ORAL at 08:34

## 2024-01-18 RX ADMIN — HYDROCORTISONE: 0.01 CREAM TOPICAL at 08:39

## 2024-01-18 RX ADMIN — ACETAMINOPHEN 1000 MG: 500 TABLET ORAL at 15:10

## 2024-01-18 RX ADMIN — Medication 500 MG: at 19:52

## 2024-01-18 RX ADMIN — TAMSULOSIN HYDROCHLORIDE 0.4 MG: 0.4 CAPSULE ORAL at 10:40

## 2024-01-18 RX ADMIN — LACTULOSE 20 G: 20 SOLUTION ORAL at 21:34

## 2024-01-18 RX ADMIN — ACETAMINOPHEN 1000 MG: 500 TABLET ORAL at 06:10

## 2024-01-18 RX ADMIN — Medication: at 19:54

## 2024-01-18 RX ADMIN — FINASTERIDE 5 MG: 5 TABLET, FILM COATED ORAL at 08:31

## 2024-01-18 RX ADMIN — MEMANTINE 10 MG: 10 TABLET ORAL at 08:32

## 2024-01-18 RX ADMIN — PREDNISONE 10 MG: 5 TABLET ORAL at 08:32

## 2024-01-18 RX ADMIN — Medication: at 08:39

## 2024-01-18 RX ADMIN — MEMANTINE 10 MG: 10 TABLET ORAL at 19:51

## 2024-01-18 RX ADMIN — HYDROCORTISONE: 0.01 CREAM TOPICAL at 19:54

## 2024-01-18 RX ADMIN — PRAVASTATIN SODIUM 80 MG: 40 TABLET ORAL at 19:51

## 2024-01-18 RX ADMIN — ACETAMINOPHEN 1000 MG: 500 TABLET ORAL at 21:34

## 2024-01-18 ASSESSMENT — PAIN DESCRIPTION - DESCRIPTORS
DESCRIPTORS: ACHING
DESCRIPTORS: ACHING

## 2024-01-18 ASSESSMENT — PAIN SCALES - GENERAL
PAINLEVEL_OUTOF10: 7
PAINLEVEL_OUTOF10: 6

## 2024-01-18 ASSESSMENT — PAIN DESCRIPTION - LOCATION: LOCATION: BACK

## 2024-01-18 NOTE — PROGRESS NOTES
End of Shift Note     Bedside shift change report given to ANAM Molina (oncoming nurse) by Raven Cheng RN (offgoing nurse).  Report included the following information Nurse Handoff Report, MAR, and Recent Results        Shift worked:  Days   Shift summary and any significant changes:     Q2 turns   Patient up to bedside commode and chair throughout shift          Concerns for physician to address:  none   Zone phone for oncoming shift:   [phone]      Patient Information  Jone Meneses  83 y.o.  1/8/2024  2:40 PM by Jone Melchor MD. Jone Meneses was admitted from Home     Problem List       Patient Active Problem List     Diagnosis Date Noted    Adult failure to thrive 01/15/2024    DNR (do not resuscitate) discussion 01/15/2024    Advance care planning 01/15/2024    Palliative care by specialist 01/10/2024    Counseling regarding advance care planning and goals of care 01/10/2024    Need for emotional support 01/10/2024    Impaired cognitive ability 01/10/2024    Physical debility 01/10/2024    Polyarthralgia 01/09/2024    Calculus of gallbladder with biliary obstruction but without cholecystitis 11/04/2023    Primary osteoarthritis of right knee 08/19/2023    Venous insufficiency 04/23/2023    Reactive depression 12/29/2022    Physical deconditioning 12/09/2021    Neurogenic bladder 08/08/2021    Cerebral microvascular disease 05/28/2020    Ataxia 04/09/2020    Vitamin D deficiency 04/09/2020    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 04/09/2020    Degenerative cervical spinal stenosis 04/09/2020    Bilateral carotid artery stenosis 04/09/2020    B12 deficiency 04/09/2020    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    Alzheimer's type dementia with late onset without behavioral disturbance (HCC) 04/09/2020    Degenerative lumbar spinal stenosis 04/09/2020    Cervical spondylosis 01/15/2020    Urinary urgency 01/15/2020    Unsteady gait 01/15/2020    CKD (chronic kidney disease) stage 3,  and must follow restraint documentation policy.)     DVT prophylaxis:  DVT prophylaxis Med- yes  DVT prophylaxis SCD or LUCRETIA- no      Wounds: (If Applicable)  Wounds- no  Location      Active Consults:  IP CONSULT TO PALLIATIVE CARE  IP CONSULT TO NEPHROLOGY     Length of Stay:  Expected LOS: 11  Actual LOS: 9  Discharge Plan: yes ERNIE Cheng RN

## 2024-01-18 NOTE — PLAN OF CARE
Problem: Occupational Therapy - Adult  Goal: By Discharge: Performs self-care activities at highest level of function for planned discharge setting.  See evaluation for individualized goals.  Description: FUNCTIONAL STATUS PRIOR TO ADMISSION:  Pt has a history of Alzheimers but was ambulating with rollator walker and one assist, wife was getting in the shower with pt 1x wk to assist with bathing, recent difficult with pt stepping over lip of shower, family placed a RTS over toilet but pt was still unable to get up off of commode without assist, pt was able to assist with straightening shirt over body but family otherwise dresses pt, pt was able to cut food and self feed with utensils but most recently pt has been finger feeding and unable to use utensils, sudden decline in overall mobility over the past 4-5 days and pt was bed bound just prior to admit    HOME SUPPORT: Patient lived elderly wife and pts daughter moved in with her parents to assist and is very involved in pts care.    Occupational Therapy Goals:  Weekly Re-assessment 1/17/2024   Initiated 1/9/2024  1.  Patient will perform grooming with Stand by Assist within 7 day(s).  2.  Patient will perform self-feeding with Supervision within 7 day(s).  3.  Patient will perform upper body dressing with Moderate Assist within 7 day(s). Met upgrade to min assist  4.  Patient will perform sit to stand with moderate assist in prep for ADL transfers within 7 day(s). Met 1/17/2024 upgrade to toilet transfer with RW and min assist  5.  Patient will perform upper body bathing with SBA seated with good balance within 7 day(s).  1/17/2024 1543 by Chana Lyons, OTR/L  Outcome: Not Progressing     Problem: Occupational Therapy - Adult  Goal: By Discharge: Performs self-care activities at highest level of function for planned discharge setting.  See evaluation for individualized goals.  Description: FUNCTIONAL STATUS PRIOR TO ADMISSION:  Pt has a history of

## 2024-01-18 NOTE — PROGRESS NOTES
General Daily Progress Note    Admit Date: 1/8/2024    Subjective:     Patient feels better    Current Facility-Administered Medications   Medication Dose Route Frequency    predniSONE (DELTASONE) tablet 10 mg  10 mg Oral BID    bisacodyl (DULCOLAX) EC tablet 10 mg  10 mg Oral Daily PRN    lactulose (CHRONULAC) 10 GM/15ML solution 20 g  20 g Oral TID    balsum peru-castor oil (VENELEX) ointment   Topical BID    salsalate (DISALCID) tablet 500 mg (Patient Supplied)  500 mg Oral BID    enoxaparin (LOVENOX) injection 40 mg  40 mg SubCUTAneous Daily    hydrocortisone 1 % cream   Topical BID    finasteride (PROSCAR) tablet 5 mg  5 mg Oral Daily    tamsulosin (FLOMAX) capsule 0.4 mg  0.4 mg Oral Daily    acetaminophen (TYLENOL) tablet 1,000 mg  1,000 mg Oral q8h    aspirin EC tablet 81 mg  81 mg Oral Daily    FLUoxetine (PROZAC) capsule 10 mg  10 mg Oral Daily    memantine (NAMENDA) tablet 10 mg  10 mg Oral BID    pravastatin (PRAVACHOL) tablet 80 mg  80 mg Oral QHS    sodium chloride flush 0.9 % injection 5-40 mL  5-40 mL IntraVENous 2 times per day    sodium chloride flush 0.9 % injection 5-40 mL  5-40 mL IntraVENous PRN    0.9 % sodium chloride infusion   IntraVENous PRN    ondansetron (ZOFRAN-ODT) disintegrating tablet 4 mg  4 mg Oral Q8H PRN    Or    ondansetron (ZOFRAN) injection 4 mg  4 mg IntraVENous Q6H PRN    polyethylene glycol (GLYCOLAX) packet 17 g  17 g Oral Daily PRN        Review of Systems  Review of systems not obtained due to patient factors.    Objective:     Patient Vitals for the past 24 hrs:   BP Temp Temp src Pulse Resp SpO2   01/17/24 1606 126/80 98.2 °F (36.8 °C) Oral 70 18 97 %   01/17/24 0849 122/64 97.9 °F (36.6 °C) Oral 69 18 99 %   01/16/24 2318 129/85 97.9 °F (36.6 °C) -- 70 16 99 %   01/16/24 2142 119/75 97.7 °F (36.5 °C) Oral 76 17 --       No intake/output data recorded.  01/16 0701 - 01/17 1900  In: -   Out: 750 [Urine:750]    Physical Exam: /80   Pulse 70   Temp 98.2 °F (36.8

## 2024-01-18 NOTE — PROGRESS NOTES
dexterity  here by stating Yes or No here, otherwise this is a restraint and must follow restraint documentation policy.)     DVT prophylaxis:  DVT prophylaxis Med- yes  DVT prophylaxis SCD or LUCRETIA- no      Wounds: (If Applicable)  Wounds- no  Location      Active Consults:  IP CONSULT TO PALLIATIVE CARE  IP CONSULT TO NEPHROLOGY     Length of Stay:  Expected LOS: 11  Actual LOS: 9  Discharge Plan: yes ERNIE Cheng RN

## 2024-01-18 NOTE — CARE COORDINATION
Transition of Care Plan:     RUR:  17%  Prior Level of Functioning: needs some assistance  Disposition: SNF  If SNF or IPR: Date FOC offered: 1/10/2023  Date FOC received: 01/10/23  Accepting facility: [facility]  Date authorization started with reference number:   Date authorization received and expires:   Follow up appointments: defer to SNF  DME needed: defer to SNF  Transportation at discharge: BLS needed  IM/IMM Medicare/ letter given: 2nd needed prior to d/c  Is patient a [place] and connected with VA? Yes              If yes, was [place] transfer form completed and VA notified? Declined transfer  Caregiver Contact: Linda Meneses, wife, [phone]   Discharge Caregiver contacted prior to discharge? yes  Care Conference needed? Not at this time  Barriers to discharge: Bed availability    0842 -  following for d/c planning. Current plan is for d/c to [facility] tomorrow, 1/19.  has requested AM admission from facility, awaiting response.  will complete transport packet today.     1554 - [facility] confirmed that pt will be admitted 01/19/24, facility is requesting transport at 1500.  will arrange. CM updated family, they are agreeable. CM alerted facility that pt is part of MSS[facility] program.     CORI Pablo  Care Management

## 2024-01-18 NOTE — PLAN OF CARE
Problem: SLP Adult - Impaired Swallowing  Goal: By Discharge: Advance to least restrictive diet without signs or symptoms of aspiration for planned discharge setting.  See evaluation for individualized goals.  Description: Speech Pathology Goal:   Initiated 1/9/2024    1.  Pt will tolerate least restrictive diet without clinical s/s aspiration or respiratory decline within 7 days.    Outcome: Completed   Speech LAnguage Pathology TREATMENT/DISCHARGE    Patient: Jone Meneses (83 y.o. male)  Date: 1/18/2024  Primary Diagnosis: Adult failure to thrive [R62.7]  Sepsis (HCC) [A41.9]  Ambulatory dysfunction [R26.2]       Precautions:  Fall Risk, Bed Alarm                  ASSESSMENT :  Based on the objective data described below, the patient presents with resolved dysphagia. Timely mastication of solids. No overt s/s aspiration with successive straw sips of thin. Patient no longer requires diet modification and will advance to regular consistency.    Patient will benefit from skilled intervention to address the above impairments.     PLAN :  Recommendations and Planned Interventions:  Diet: Regular and thin liquids     Acute SLP Services: No, patient will be discharged from acute skilled speech-language pathology at this time.    Discharge Recommendations: No, additional SLP treatment not indicated at discharge     SUBJECTIVE:   Patient alert, pleasant and agreeable to PO.    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

## 2024-01-19 VITALS
HEIGHT: 72 IN | DIASTOLIC BLOOD PRESSURE: 65 MMHG | RESPIRATION RATE: 16 BRPM | BODY MASS INDEX: 23.98 KG/M2 | TEMPERATURE: 97.7 F | HEART RATE: 70 BPM | WEIGHT: 177.03 LBS | SYSTOLIC BLOOD PRESSURE: 119 MMHG | OXYGEN SATURATION: 96 %

## 2024-01-19 PROBLEM — Z51.5 PALLIATIVE CARE BY SPECIALIST: Status: RESOLVED | Noted: 2024-01-10 | Resolved: 2024-01-19

## 2024-01-19 PROBLEM — R45.89 NEED FOR EMOTIONAL SUPPORT: Status: RESOLVED | Noted: 2024-01-10 | Resolved: 2024-01-19

## 2024-01-19 PROCEDURE — 6370000000 HC RX 637 (ALT 250 FOR IP): Performed by: INTERNAL MEDICINE

## 2024-01-19 PROCEDURE — 99231 SBSQ HOSP IP/OBS SF/LOW 25: CPT | Performed by: FAMILY MEDICINE

## 2024-01-19 PROCEDURE — 6360000002 HC RX W HCPCS: Performed by: INTERNAL MEDICINE

## 2024-01-19 PROCEDURE — 6370000000 HC RX 637 (ALT 250 FOR IP): Performed by: GENERAL ACUTE CARE HOSPITAL

## 2024-01-19 PROCEDURE — 2580000003 HC RX 258: Performed by: GENERAL ACUTE CARE HOSPITAL

## 2024-01-19 RX ORDER — LACTULOSE 10 G/15ML
20 SOLUTION ORAL 3 TIMES DAILY PRN
Qty: 237 ML | Refills: 4 | Status: SHIPPED
Start: 2024-01-19

## 2024-01-19 RX ORDER — PREDNISONE 5 MG/1
15 TABLET ORAL DAILY
Qty: 90 TABLET | Refills: 0 | Status: SHIPPED
Start: 2024-01-19 | End: 2024-02-18

## 2024-01-19 RX ORDER — BENZOCAINE/MENTHOL 6 MG-10 MG
LOZENGE MUCOUS MEMBRANE
Qty: 30 G | Refills: 1 | Status: SHIPPED | OUTPATIENT
Start: 2024-01-19 | End: 2024-01-26

## 2024-01-19 RX ORDER — POLYETHYLENE GLYCOL 3350 17 G/17G
17 POWDER, FOR SOLUTION ORAL DAILY
Qty: 510 G | Refills: 4 | Status: SHIPPED
Start: 2024-01-19 | End: 2024-06-17

## 2024-01-19 RX ADMIN — FLUOXETINE 10 MG: 10 CAPSULE ORAL at 09:39

## 2024-01-19 RX ADMIN — ENOXAPARIN SODIUM 40 MG: 100 INJECTION SUBCUTANEOUS at 09:39

## 2024-01-19 RX ADMIN — TAMSULOSIN HYDROCHLORIDE 0.4 MG: 0.4 CAPSULE ORAL at 09:39

## 2024-01-19 RX ADMIN — LACTULOSE 20 G: 20 SOLUTION ORAL at 09:39

## 2024-01-19 RX ADMIN — Medication: at 09:42

## 2024-01-19 RX ADMIN — FINASTERIDE 5 MG: 5 TABLET, FILM COATED ORAL at 09:38

## 2024-01-19 RX ADMIN — HYDROCORTISONE: 0.01 CREAM TOPICAL at 09:41

## 2024-01-19 RX ADMIN — SODIUM CHLORIDE, PRESERVATIVE FREE 10 ML: 5 INJECTION INTRAVENOUS at 09:39

## 2024-01-19 RX ADMIN — PREDNISONE 15 MG: 5 TABLET ORAL at 09:38

## 2024-01-19 RX ADMIN — ACETAMINOPHEN 1000 MG: 500 TABLET ORAL at 14:37

## 2024-01-19 RX ADMIN — MEMANTINE 10 MG: 10 TABLET ORAL at 09:38

## 2024-01-19 RX ADMIN — Medication 500 MG: at 10:05

## 2024-01-19 RX ADMIN — LACTULOSE 20 G: 20 SOLUTION ORAL at 14:36

## 2024-01-19 RX ADMIN — ASPIRIN 81 MG: 81 TABLET, COATED ORAL at 09:38

## 2024-01-19 RX ADMIN — ACETAMINOPHEN 1000 MG: 500 TABLET ORAL at 05:20

## 2024-01-19 ASSESSMENT — PAIN DESCRIPTION - LOCATION: LOCATION: ABDOMEN

## 2024-01-19 ASSESSMENT — PAIN DESCRIPTION - DESCRIPTORS: DESCRIPTORS: ACHING

## 2024-01-19 ASSESSMENT — PAIN SCALES - GENERAL: PAINLEVEL_OUTOF10: 6

## 2024-01-19 NOTE — CARE COORDINATION
Going to SNF - [facility]. Bed # 210, call report to [phone]. [facility] to transport at 1530. Clear from .     Transition of Care Plan:     RUR:  16%  Prior Level of Functioning: needs some assistance  Disposition: SNF  If SNF or IPR: Date FOC offered: 1/10/2023  Date FOC received: 01/10/23  Accepting facility: [facility]  Date authorization started with reference number:   Date authorization received and expires:   Follow up appointments: defer to SNF  DME needed: defer to SNF  Transportation at discharge: [facility] @ 1530  IM/IMM Medicare/ letter given: 2nd  given  Is patient a [place] and connected with VA? Yes              If yes, was  transfer form completed and VA notified? Declined transfer  Caregiver Contact: Linda Meneses, wife, [phone]   Discharge Caregiver contacted prior to discharge? Yes  Care Conference needed? Not at this time  Barriers to discharge: None    0845 - Plan to d/c to [facility] today, pending bed info. Call report to [phone]. Facility requested PM d/c, [facility] confirmed for 1530. Transport packet complete. Family aware of plan. CM made facility aware that pt is part of [facility]. CM to fax clinicals to [facility].     1138 - Bed # 210, clear from . CM faxed clinicals to [facility] at [phone].    Transition of Care Plan to SNF/Rehab    Communication to Patient/Family:  Met with patient and family and they are agreeable to the transition plan. The Plan for Transition of Care is related to the following treatment goals: SNF in order to return to previous level of independence    The Patient and/or patient representative was provided with a choice of provider and agrees  with the discharge plan.      Yes [x] No []    A Freedom of choice list was provided with basic dialogue that supports the patient's individualized plan of care/goals and shares the quality data associated with the providers.       Yes [x] No []    SNF/Rehab Transition:  Patient [facility] and seek direct admission to a VA nursing facility  [] Individuals who are pateints or residents of a state owned/operated facility that is licensed by Department of Behavioral Services (DBHDS) and seek direct admission to VA nursing facility  [] A screening not required for enrollment in Medicaid Hospice services as set out in 12 VAC 30-  [] [facility] ([facility]) staff shall perform screenings of the [facility] clients.    Advanced Care Plan:  []Surrogate Decision Maker of Care  []POA  []Communicated Code Status and copy sent.    Other:          01/19/24 0847   Services At/After Discharge   Transition of Care Consult (CM Consult) SNF   Partner SNF Yes   Services At/After Discharge Skilled Nursing Facility (SNF)   [place] Resource Information Provided? Yes   Mode of Transport at Discharge BLS   Confirm Follow Up Transport Family   Condition of Participation: Discharge Planning   The Plan for Transition of Care is related to the following treatment goals: SNF in order to return to previous level of independence   The Patient and/or Patient Representative was provided with a Choice of Provider? Patient Representative   Name of the Patient Representative who was provided with the Choice of Provider and agrees with the Discharge Plan?  DTR - Don   The Patient and/Or Patient Representative agree with the Discharge Plan? Yes   Freedom of Choice list was provided with basic dialogue that supports the patient's individualized plan of care/goals, treatment preferences, and shares the quality data associated with the providers?  Yes     CORI Pablo  Care Management

## 2024-01-19 NOTE — PLAN OF CARE
Problem: Discharge Planning  Goal: Discharge to home or other facility with appropriate resources  Outcome: Progressing     Problem: Safety - Adult  Goal: Free from fall injury  Outcome: Progressing     Problem: Skin/Tissue Integrity  Goal: Absence of new skin breakdown  Description: 1.  Monitor for areas of redness and/or skin breakdown  2.  Assess vascular access sites hourly  3.  Every 4-6 hours minimum:  Change oxygen saturation probe site  4.  Every 4-6 hours:  If on nasal continuous positive airway pressure, respiratory therapy assess nares and determine need for appliance change or resting period.  Outcome: Progressing     Problem: ABCDS Injury Assessment  Goal: Absence of physical injury  Outcome: Progressing     Problem: Neurosensory - Adult  Goal: Achieves stable or improved neurological status  Outcome: Progressing  Goal: Achieves maximal functionality and self care  Outcome: Progressing     Problem: Cardiovascular - Adult  Goal: Maintains optimal cardiac output and hemodynamic stability  Outcome: Progressing  Goal: Absence of cardiac dysrhythmias or at baseline  Outcome: Progressing     Problem: Skin/Tissue Integrity - Adult  Goal: Skin integrity remains intact  Outcome: Progressing  Flowsheets (Taken 1/18/2024 2102)  Skin Integrity Remains Intact: Monitor for areas of redness and/or skin breakdown  Goal: Incisions, wounds, or drain sites healing without S/S of infection  Outcome: Progressing  Goal: Oral mucous membranes remain intact  Outcome: Progressing     Problem: Musculoskeletal - Adult  Goal: Return mobility to safest level of function  Outcome: Progressing  Goal: Maintain proper alignment of affected body part  Outcome: Progressing  Goal: Return ADL status to a safe level of function  Outcome: Progressing     Problem: Infection - Adult  Goal: Absence of infection at discharge  Outcome: Progressing  Goal: Absence of infection during hospitalization  Outcome: Progressing  Goal: Absence of

## 2024-01-19 NOTE — PROGRESS NOTES
Palliative Medicine  Patient Name: Jone Meneses  YOB: 1940  MRN: [id number]  Age: 83 y.o.  Gender: male    Date of Initial Consult: 1/10/2024  Date of Service: 1/19/2024  Time: 1:33 PM  Provider: Lia Campo MD  Hospital Day: 12  Admit Date: 1/8/2024  Referring Provider: Dr. Laurent       Reasons for Consultation:  Goals of Care    HISTORY OF PRESENT ILLNESS (HPI):   Jone Meneses is a 83 y.o. male with Dementia, HTN, CAD s/p CABG, second-degree heart block s/p PPM, CKD 3, BPH, arthritis who was admitted on 1/8/2024 from home with a diagnosis of fever, FTT, ambulatory dysfunction and worsening dementia. Workup has not revealed an infectious cause. He has been initiated on a course of prednisone for joint pain. PT/OT has recommended SNF. SLP has also evaluated and has recommended easy to chew and thin liquids diet. Nephrology is following for CKD and cysts of kidneys. Flomax and finasteride have been ordered.    1/8/2024 CT Head:  IMPRESSION:  No acute intracranial process.  Imaging findings consistent with moderate chronic microvascular ischemic change.  There is a moderate degree of cerebral atrophy.    Psychosocial: Patient lives with his wife Linda and daughter Don, who moved in with them relatively recently. Patient and his wife have three children, Frank (lives in [facility]), Owen Ochoa (lives near [place]). Patient spends most of his day sleeping. When family tries to get him up, he tells them he is not hungry and does not want to get up. He eats about one meal per day. He can walk on some days with a RW for a few laps in the house. His daughter tries to help him with floor pedal exercises, but he hasn't really done this in a couple weeks. He has had two falls in the past year. He is retired from working with the [employer]. 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    PALLIATIVE DIAGNOSES:    Encounter for  lb 14.4 oz)   08/17/23 82.6 kg (182 lb 1.6 oz)   04/17/23 81 kg (178 lb 8 oz)   12/12/22 82.4 kg (181 lb 11.2 oz)   08/11/22 77.7 kg (171 lb 6.4 oz)   04/11/22 82.8 kg (182 lb 8 oz)   12/09/21 82.7 kg (182 lb 6.4 oz)   08/02/21 82.1 kg (181 lb)   04/02/21 81.2 kg (179 lb)        Current Diet: ADULT DIET; Regular; no pork       PSYCHOSOCIAL/SPIRITUAL SCREENING:   Palliative IDT has assessed this patient for cultural preferences / practices and a referral made as appropriate to needs (Cultural Services, Patient Advocacy, Ethics, etc.)    Spiritual Affiliation: Baptism    Any spiritual / Mormonism concerns:  [] Yes /  [x] No   If \"Yes\" to discuss with pastoral care during IDT     Does caregiver feel burdened by caring for their loved one:   [] Yes /  [x] No /  [] No Caregiver Present/Available [] No Caregiver [] Pt Lives at Facility  If \"Yes\" to discuss with social work during IDT    Anticipatory grief assessment:   [x] Normal  / [] Maladaptive     If \"Maladaptive\" to discuss with social work during IDT    ESAS Anxiety: Anxiety Score: Not anxious    ESAS Depression:          LAB AND IMAGING FINDINGS:   Objective data reviewed:  labs, images, records, medication use, vitals, and chart     FINAL COMMENTS   Thank you for allowing Palliative Medicine to participate in the care of Jone Meneses.    Only check if applicable and billing time based rather than MDM  [] The total encounter time on this service date was in conversation with wife minutes which was spent performing a face-to-face encounter and personally completing the provider-level activities documented in the note. This includes time spent prior to the visit and after the visit in direct care of the patient. This time does not include time spent in any separately reportable services.    Electronically signed by   Lia Campo MD  Palliative Care Team  on 1/19/2024 at 1:33 PM

## 2024-01-19 NOTE — PROGRESS NOTES
End of Shift Note     Bedside shift change report given to ANAM Hare (oncoming nurse) by ANAM Molina (offgoing nurse).  Report included the following information Nurse Handoff Report, MAR, and Recent Results        Shift worked: nights   Shift summary and any significant changes:     Q2 turns   No acute changes overnight  Patient waiting for discharge    Concerns for physician to address:  none   Zone phone for oncoming shift:   [phone]      Patient Information  Jone Meneses  83 y.o.  1/8/2024  2:40 PM by Jone Melchor MD. Jone Meneses was admitted from Home     Problem List       Patient Active Problem List     Diagnosis Date Noted    Adult failure to thrive 01/15/2024    DNR (do not resuscitate) discussion 01/15/2024    Advance care planning 01/15/2024    Palliative care by specialist 01/10/2024    Counseling regarding advance care planning and goals of care 01/10/2024    Need for emotional support 01/10/2024    Impaired cognitive ability 01/10/2024    Physical debility 01/10/2024    Polyarthralgia 01/09/2024    Calculus of gallbladder with biliary obstruction but without cholecystitis 11/04/2023    Primary osteoarthritis of right knee 08/19/2023    Venous insufficiency 04/23/2023    Reactive depression 12/29/2022    Physical deconditioning 12/09/2021    Neurogenic bladder 08/08/2021    Cerebral microvascular disease 05/28/2020    Ataxia 04/09/2020    Vitamin D deficiency 04/09/2020    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 04/09/2020    Degenerative cervical spinal stenosis 04/09/2020    Bilateral carotid artery stenosis 04/09/2020    B12 deficiency 04/09/2020    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    Alzheimer's type dementia with late onset without behavioral disturbance (HCC) 04/09/2020    Degenerative lumbar spinal stenosis 04/09/2020    Cervical spondylosis 01/15/2020    Urinary urgency 01/15/2020    Unsteady gait 01/15/2020    CKD (chronic kidney disease) stage 3, GFR 30-59  follow restraint documentation policy.)     DVT prophylaxis:  DVT prophylaxis Med- yes  DVT prophylaxis SCD or LUCRETIA- no      Wounds: (If Applicable)  Wounds- no  Location      Active Consults:  IP CONSULT TO PALLIATIVE CARE  IP CONSULT TO NEPHROLOGY     Length of Stay:  Expected LOS: 11  Actual LOS: 9  Discharge Plan: yes ERNIE Cheng RN

## 2024-01-19 NOTE — DISCHARGE INSTRUCTIONS
General Discharge Instructions    Patient ID:  Jone Meneses  [id number]  83 y.o.  1940    Patient Instructions          The following personal items were collected during your admission and were returned to you.        Take Home Medications             What to do at Home    Recommended diet:  Dental soft    Recommended activity: activity as tolerated    Follow-up with Jeovany Barrientos MD  in 5 days after d/c from rehab/SNF.        Information obtained by :  I understand that if any problems occur once I am at home I am to contact my physician.  I understand and acknowledge receipt of the instructions indicated above.                                                                                                                                           Physician's or R.N.'s Signature                                                                  Date/Time                                                                                                                                              Patient or Representative Signature                                                          Date/Time

## 2024-01-19 NOTE — PROGRESS NOTES
General Daily Progress Note    Admit Date: 1/8/2024    Subjective:     Patient feels better    Current Facility-Administered Medications   Medication Dose Route Frequency    predniSONE (DELTASONE) tablet 10 mg  10 mg Oral BID    bisacodyl (DULCOLAX) EC tablet 10 mg  10 mg Oral Daily PRN    lactulose (CHRONULAC) 10 GM/15ML solution 20 g  20 g Oral TID    balsum peru-castor oil (VENELEX) ointment   Topical BID    salsalate (DISALCID) tablet 500 mg (Patient Supplied)  500 mg Oral BID    enoxaparin (LOVENOX) injection 40 mg  40 mg SubCUTAneous Daily    hydrocortisone 1 % cream   Topical BID    finasteride (PROSCAR) tablet 5 mg  5 mg Oral Daily    tamsulosin (FLOMAX) capsule 0.4 mg  0.4 mg Oral Daily    acetaminophen (TYLENOL) tablet 1,000 mg  1,000 mg Oral q8h    aspirin EC tablet 81 mg  81 mg Oral Daily    FLUoxetine (PROZAC) capsule 10 mg  10 mg Oral Daily    memantine (NAMENDA) tablet 10 mg  10 mg Oral BID    pravastatin (PRAVACHOL) tablet 80 mg  80 mg Oral QHS    sodium chloride flush 0.9 % injection 5-40 mL  5-40 mL IntraVENous 2 times per day    sodium chloride flush 0.9 % injection 5-40 mL  5-40 mL IntraVENous PRN    0.9 % sodium chloride infusion   IntraVENous PRN    ondansetron (ZOFRAN-ODT) disintegrating tablet 4 mg  4 mg Oral Q8H PRN    Or    ondansetron (ZOFRAN) injection 4 mg  4 mg IntraVENous Q6H PRN    polyethylene glycol (GLYCOLAX) packet 17 g  17 g Oral Daily PRN        Review of Systems  Review of systems not obtained due to patient factors.    Objective:     Patient Vitals for the past 24 hrs:   BP Temp Temp src Pulse Resp SpO2   01/18/24 1547 116/65 98.1 °F (36.7 °C) -- 76 14 99 %   01/18/24 0825 130/70 97.7 °F (36.5 °C) Oral 70 16 98 %   01/17/24 2308 (!) 146/84 97.3 °F (36.3 °C) -- 70 18 97 %   01/17/24 2219 126/80 98.2 °F (36.8 °C) Oral 70 18 --       No intake/output data recorded.  01/17 0701 - 01/18 1900  In: -   Out: 550 [Urine:550]    Physical Exam: /65   Pulse 76   Temp 98.1 °F (36.7

## 2024-01-20 NOTE — PROGRESS NOTES
Discharge instructions sent with patient to [facility]. IV was removed. EMS provided transport. Called report. All of RN's questions were answered.

## 2024-02-07 ENCOUNTER — HOME HEALTH ADMISSION (OUTPATIENT)
Dept: HOME HEALTH SERVICES | Facility: HOME HEALTH | Age: 84
End: 2024-02-07
Payer: MEDICARE

## 2024-02-08 ENCOUNTER — HOME CARE VISIT (OUTPATIENT)
Facility: HOME HEALTH | Age: 84
End: 2024-02-08

## 2024-02-08 VITALS
OXYGEN SATURATION: 98 % | SYSTOLIC BLOOD PRESSURE: 132 MMHG | TEMPERATURE: 97.4 F | DIASTOLIC BLOOD PRESSURE: 64 MMHG | RESPIRATION RATE: 18 BRPM | HEART RATE: 91 BPM

## 2024-02-08 PROCEDURE — 0221000100 HH NO PAY CLAIM PROCEDURE

## 2024-02-08 PROCEDURE — G0299 HHS/HOSPICE OF RN EA 15 MIN: HCPCS

## 2024-02-08 ASSESSMENT — ENCOUNTER SYMPTOMS: DYSPNEA ACTIVITY LEVEL: AFTER AMBULATING LESS THAN 10 FT

## 2024-02-08 NOTE — HOME HEALTH
stated \"he didn't sleep well last night\"    Caregiver: spouse.  Caregiver assists with: Medications, Meals, Bathing, ADL, IADL, Transportation and Housekeeping Caregiver unable to assist with: N/A. Caregiver is available Regularly Caregiver is  present at this visit and did participate with clinician.    Medications reconciled and all medications are available in the home this visit. The following education was provided regarding medications: medication interactions and look alike medications: N/A. Patient/CG able to demonstrate knowledge through teach back with 75 percent accuracy.      Any discrepancies/medication interactions N/A no severe interactions found.      A list of reconciled medications has been given to the patient/caregiver  and uploaded to media, wife also has list of medications on phone.    High risk med teaching was performed on Aspirin  High alert medication teaching on Aspirin (enter name of medication), antiplatelet therapy education;purpose, dose, and frequency, on the potential for increased bleeding from falls and lacerations and signs and symptoms of abnormal bleeding such as unexplained bruising, dizziness/lightheadedness, red or tarry looking stool, blood in urine, blood in vomit  Patient at risk for falls Yes:   Recommended requesting PT/OT orders due to fall risk YES:   Patient response to recommended requesting of PT/OT orders: PT/OT ordered    Interdisciplinary communication with: MD for pt. status update    Written Teaching Material Utilized: admission handbook, cope at home flyer    Clinician reviewed orientation to home health booklet with patient/caregiver including agency phone number, agency complaint process, state hotline number, as well as [employer]'s quality hotline number. Emergency preparedness reviewed with patient/caregiver in home health booklet. Consent forms signed    Patient/caregiver instructed on plan of care and are agreeable to plan of care at this time.

## 2024-02-12 ENCOUNTER — HOME CARE VISIT (OUTPATIENT)
Facility: HOME HEALTH | Age: 84
End: 2024-02-12
Payer: MEDICARE

## 2024-02-12 VITALS
SYSTOLIC BLOOD PRESSURE: 124 MMHG | TEMPERATURE: 98.1 F | DIASTOLIC BLOOD PRESSURE: 73 MMHG | OXYGEN SATURATION: 100 % | HEART RATE: 83 BPM

## 2024-02-12 PROCEDURE — G0152 HHCP-SERV OF OT,EA 15 MIN: HCPCS

## 2024-02-13 ENCOUNTER — HOME CARE VISIT (OUTPATIENT)
Dept: HOME HEALTH SERVICES | Facility: HOME HEALTH | Age: 84
End: 2024-02-13
Payer: MEDICARE

## 2024-02-14 ENCOUNTER — HOME CARE VISIT (OUTPATIENT)
Facility: HOME HEALTH | Age: 84
End: 2024-02-14
Payer: MEDICARE

## 2024-02-14 VITALS
RESPIRATION RATE: 16 BRPM | SYSTOLIC BLOOD PRESSURE: 130 MMHG | DIASTOLIC BLOOD PRESSURE: 70 MMHG | HEART RATE: 70 BPM | TEMPERATURE: 97.3 F | OXYGEN SATURATION: 98 %

## 2024-02-14 VITALS
TEMPERATURE: 97.2 F | RESPIRATION RATE: 17 BRPM | HEART RATE: 70 BPM | DIASTOLIC BLOOD PRESSURE: 70 MMHG | SYSTOLIC BLOOD PRESSURE: 130 MMHG | OXYGEN SATURATION: 98 %

## 2024-02-14 PROCEDURE — G0158 HHC OT ASSISTANT EA 15: HCPCS

## 2024-02-14 PROCEDURE — G0151 HHCP-SERV OF PT,EA 15 MIN: HCPCS

## 2024-02-14 NOTE — HOME HEALTH
Subjective: no c/o from pt reports feeling well with no pain  Falls since last visit (if yes complete the Fall Tracking Form and include bsrifallreport): No   Caregiver involvement changes: No   Home health supplies by type and quantity ordered/delivered this visit include: no  Clinician asked if patient has had any physician contact since last home care visit and patient states: No   Clinician asked if patient has any new or changed medications and patient states: No   If Yes, were medications reconciled? N/a   Was the certifying physician notified of changes in medications? n/a   Clinical assessment (what this visit means for the patient overall and need for ongoing skilled care) and progress or lack of progress towards SPECIFIC goals: Patient progressing with ADL transfer training, shower, transfers present to be most difficult for sequencing for patient. Any time CGA for transfer in ambulation with min verbal queuing. Will continue to require skilled OT services to address, shower, safety, dressing, strength, training, and caregiver training for carryover.  Written Teaching Material Utilized: no  Interdisciplinary communication with: PT  Discharge planning as follows: Per physician order, Will discharge when the patient has reached their maximum functional potential and maximum safety in their home and When goals are met   Specific plan for next visit: shower/ADLs

## 2024-02-14 NOTE — HOME HEALTH
Reason for referral, [facility] SUMMARY of clinical condition:The Pt was hospitalized in early january for two weeks due to generalized weakness, lack of eating or drinking, Dehydration. The pt stayed in hospital 10 days and then was sent to [facility] and then home with     Clinical Assessment/Skilled reason for admission to home health (What this means for the patient overall and need for ongoing skilled care):The pt lives in a two story home with wife and has a pd caregiver 3x/weeks. The Pt usese a rollator walker but due to dementia is having increased trouble coordinating use of walker and where to put the walker when transitioning to chair, bed and particularly shower and toilet. The OT and PT overlapped today and the pt was able to transfer into the shower with A from PT To help him realize he needed to move the walker and the OT with the transfer. The Pt is alert but sometime slow in answering. The family wants PT to help them and the cg as well as the pt to maintain what progress he has gained in rehab and progress it if possible to make him a bit more independent or at least safer when he does transfers. The Pt has all the equipment they need for now and [facility] at [facility] this week and just installed the WC lift in the garage as he was struggling with stairs previously. The family is also awaiting and transport chair.   PT performed the following during today's visit: medication reconciliation, gait training, bed mobility training, fall risk education and prevention, transfer training, safe use of AD, deep breathing techniques for SOB, home safety evaluation , review PMH, review s/s the warrent a call to MD [id number], education about high risk medications:  High alert medication teaching on ASA, antiplatelet therapy education;purpose, dose, and frequency, food/drug interactions, risks of co-administration with other medications such as ASA, NSAID, and herbals, bleeding prevention

## 2024-02-16 ENCOUNTER — HOME CARE VISIT (OUTPATIENT)
Facility: HOME HEALTH | Age: 84
End: 2024-02-16
Payer: MEDICARE

## 2024-02-16 PROCEDURE — G0299 HHS/HOSPICE OF RN EA 15 MIN: HCPCS

## 2024-02-18 VITALS
DIASTOLIC BLOOD PRESSURE: 78 MMHG | SYSTOLIC BLOOD PRESSURE: 124 MMHG | OXYGEN SATURATION: 100 % | TEMPERATURE: 98.1 F | HEART RATE: 71 BPM | RESPIRATION RATE: 20 BRPM

## 2024-02-19 ENCOUNTER — OFFICE VISIT (OUTPATIENT)
Facility: CLINIC | Age: 84
End: 2024-02-19
Payer: MEDICARE

## 2024-02-19 ENCOUNTER — HOME CARE VISIT (OUTPATIENT)
Facility: HOME HEALTH | Age: 84
End: 2024-02-19
Payer: MEDICARE

## 2024-02-19 VITALS
RESPIRATION RATE: 16 BRPM | DIASTOLIC BLOOD PRESSURE: 78 MMHG | OXYGEN SATURATION: 98 % | HEART RATE: 70 BPM | SYSTOLIC BLOOD PRESSURE: 110 MMHG | TEMPERATURE: 97.9 F

## 2024-02-19 VITALS
BODY MASS INDEX: 24.24 KG/M2 | DIASTOLIC BLOOD PRESSURE: 78 MMHG | TEMPERATURE: 97.6 F | HEART RATE: 70 BPM | SYSTOLIC BLOOD PRESSURE: 121 MMHG | WEIGHT: 179 LBS | RESPIRATION RATE: 16 BRPM | HEIGHT: 72 IN | OXYGEN SATURATION: 98 %

## 2024-02-19 DIAGNOSIS — I25.5 CARDIOMYOPATHY, ISCHEMIC: ICD-10-CM

## 2024-02-19 DIAGNOSIS — G30.1 ALZHEIMER'S TYPE DEMENTIA WITH LATE ONSET WITHOUT BEHAVIORAL DISTURBANCE (HCC): Primary | ICD-10-CM

## 2024-02-19 DIAGNOSIS — Z09 HOSPITAL DISCHARGE FOLLOW-UP: ICD-10-CM

## 2024-02-19 DIAGNOSIS — E78.5 DYSLIPIDEMIA: ICD-10-CM

## 2024-02-19 DIAGNOSIS — R53.81 PHYSICAL DEBILITY: ICD-10-CM

## 2024-02-19 DIAGNOSIS — N18.31 STAGE 3A CHRONIC KIDNEY DISEASE (HCC): ICD-10-CM

## 2024-02-19 DIAGNOSIS — I87.2 VENOUS INSUFFICIENCY: ICD-10-CM

## 2024-02-19 DIAGNOSIS — F02.80 ALZHEIMER'S TYPE DEMENTIA WITH LATE ONSET WITHOUT BEHAVIORAL DISTURBANCE (HCC): Primary | ICD-10-CM

## 2024-02-19 PROCEDURE — 1111F DSCHRG MED/CURRENT MED MERGE: CPT | Performed by: INTERNAL MEDICINE

## 2024-02-19 PROCEDURE — G8427 DOCREV CUR MEDS BY ELIG CLIN: HCPCS | Performed by: INTERNAL MEDICINE

## 2024-02-19 PROCEDURE — 3078F DIAST BP <80 MM HG: CPT | Performed by: INTERNAL MEDICINE

## 2024-02-19 PROCEDURE — 1123F ACP DISCUSS/DSCN MKR DOCD: CPT | Performed by: INTERNAL MEDICINE

## 2024-02-19 PROCEDURE — G0158 HHC OT ASSISTANT EA 15: HCPCS

## 2024-02-19 PROCEDURE — G8484 FLU IMMUNIZE NO ADMIN: HCPCS | Performed by: INTERNAL MEDICINE

## 2024-02-19 PROCEDURE — 99214 OFFICE O/P EST MOD 30 MIN: CPT | Performed by: INTERNAL MEDICINE

## 2024-02-19 PROCEDURE — G8420 CALC BMI NORM PARAMETERS: HCPCS | Performed by: INTERNAL MEDICINE

## 2024-02-19 PROCEDURE — 1036F TOBACCO NON-USER: CPT | Performed by: INTERNAL MEDICINE

## 2024-02-19 PROCEDURE — 3074F SYST BP LT 130 MM HG: CPT | Performed by: INTERNAL MEDICINE

## 2024-02-19 ASSESSMENT — PATIENT HEALTH QUESTIONNAIRE - PHQ9
SUM OF ALL RESPONSES TO PHQ QUESTIONS 1-9: 0
8. MOVING OR SPEAKING SO SLOWLY THAT OTHER PEOPLE COULD HAVE NOTICED. OR THE OPPOSITE, BEING SO FIGETY OR RESTLESS THAT YOU HAVE BEEN MOVING AROUND A LOT MORE THAN USUAL: 0
4. FEELING TIRED OR HAVING LITTLE ENERGY: 0
7. TROUBLE CONCENTRATING ON THINGS, SUCH AS READING THE NEWSPAPER OR WATCHING TELEVISION: 0
1. LITTLE INTEREST OR PLEASURE IN DOING THINGS: 0
5. POOR APPETITE OR OVEREATING: 0
6. FEELING BAD ABOUT YOURSELF - OR THAT YOU ARE A FAILURE OR HAVE LET YOURSELF OR YOUR FAMILY DOWN: 0
SUM OF ALL RESPONSES TO PHQ QUESTIONS 1-9: 0
SUM OF ALL RESPONSES TO PHQ QUESTIONS 1-9: 0
3. TROUBLE FALLING OR STAYING ASLEEP: 0
10. IF YOU CHECKED OFF ANY PROBLEMS, HOW DIFFICULT HAVE THESE PROBLEMS MADE IT FOR YOU TO DO YOUR WORK, TAKE CARE OF THINGS AT HOME, OR GET ALONG WITH OTHER PEOPLE: 0
9. THOUGHTS THAT YOU WOULD BE BETTER OFF DEAD, OR OF HURTING YOURSELF: 0
2. FEELING DOWN, DEPRESSED OR HOPELESS: 0
SUM OF ALL RESPONSES TO PHQ QUESTIONS 1-9: 0
SUM OF ALL RESPONSES TO PHQ9 QUESTIONS 1 & 2: 0

## 2024-02-19 NOTE — PROGRESS NOTES
Chief Complaint   Patient presents with    Follow-Up from Hospital     Patient is here for a follow up.      \"Have you been to the ER, urgent care clinic since your last visit?  Hospitalized since your last visit?\"    YES - When: approximately 3  weeks ago.  Where and Why: Weakness.    “Have you seen or consulted any other health care providers outside of [facility] since your last visit?”    NO

## 2024-02-19 NOTE — HOME HEALTH
Subjective: pt getting ready when TYRESE arrived; wife reported pt requests for TYRESE to wait to come in until he has his underwear on  Falls since last visit (if yes complete the Fall Tracking Form and include bsrifallreport): No   Caregiver involvement changes: No   Home health supplies by type and quantity ordered/delivered this visit include: no   Clinician asked if patient has had any physician contact since last home care visit and patient states: No   Clinician asked if patient has any new or changed medications and patient states: No   If Yes, were medications reconciled? N/a   Was the certifying physician notified of changes in medications? n/a   Clinical assessment (what this visit means for the patient overall and need for ongoing skilled care) and progress or lack of progress towards SPECIFIC goals: Patient progressing with ADL goals with completion of toileting, sink bathing, and dressing with supervision with cues for safety and sequencing. Patient has been resistant for assistance along with requesting privacy during wash routines and resistant for shower training due to this issue. Wife has concerns that ADLs take so long that patient gets off track with meals and often performs differently outside of therapy with her or paid caregiver with increased confusion. Education on visual and verbal cues and will continue to require further skilled OT services to address incorporation of these strategies.  Written Teaching Material Utilized: no   Interdisciplinary communication with: no   Discharge planning as follows: Per physician order, Will discharge when the patient has reached their maximum functional potential and maximum safety in their home and When goals are met   Specific plan for next visit: ADL safety training

## 2024-02-19 NOTE — PROGRESS NOTES
[facility]  [address]  [address]  [place] [address]  Phone:  [phone]  Fax: [phone]       Chief Complaint   Patient presents with    Follow-Up from Hospital     Patient is here for a follow up.    .      SUBJECTIVE:    Jone Meneses is a 83 y.o. male Comes in for return visit accompanied by his wife.  He is walking now. He has not been on steroids for the last three days.    Most recently he had an upper respiratory infection and went to an urgent care center on two separate occasions.  He finally ended up getting steroids and an antibiotic for asthmatic bronchitis.  His cough has improved significantly.    The patient offers no complaints other than urinary incontinence.    He has a past history of primary hypertension, dyslipidemia, coronary artery disease, as well as progressive dementia.             Current Outpatient Medications   Medication Sig Dispense Refill    doxycycline hyclate (VIBRAMYCIN) 100 MG capsule Take 1 capsule by mouth 2 times daily      brimonidine (ALPHAGAN) 0.2 % ophthalmic solution Place 1 drop into both eyes 3 times daily Indications: Glaucoma      latanoprost (XALATAN) 0.005 % ophthalmic solution Place 1 drop into both eyes every evening      Metoprolol Succinate 100 MG CS24 Take 100 mg by mouth daily.      Multiple Vitamins-Minerals (CENTRUM ADULTS PO) Take 1 tablet by mouth daily.      Omega-3 Fatty Acids (OMEGA 3 PO) Take 1 capsule by mouth daily.      furosemide (LASIX) 40 MG tablet Take 1 tablet by mouth daily      hydrocortisone 1 % cream Apply 1 g topically 2 times daily      lactulose (CHRONULAC) 10 GM/15ML solution Take 30 mLs by mouth 3 times daily as needed (constipation) 237 mL 4    memantine ER (NAMENDA XR) 28 MG CP24 extended release capsule TAKE 1 CAPSULE BY MOUTH EVERY DAY 90 capsule 3    FLUoxetine (PROZAC) 10 MG capsule Take 1 capsule by mouth daily 90 capsule 3    salsalate (DISALCID) 500 MG tablet TAKE 1 TABLET BY

## 2024-02-20 ENCOUNTER — HOME CARE VISIT (OUTPATIENT)
Facility: HOME HEALTH | Age: 84
End: 2024-02-20
Payer: MEDICARE

## 2024-02-20 VITALS — HEART RATE: 70 BPM | DIASTOLIC BLOOD PRESSURE: 70 MMHG | TEMPERATURE: 96.9 F | SYSTOLIC BLOOD PRESSURE: 132 MMHG

## 2024-02-20 VITALS
HEART RATE: 81 BPM | DIASTOLIC BLOOD PRESSURE: 80 MMHG | SYSTOLIC BLOOD PRESSURE: 110 MMHG | OXYGEN SATURATION: 99 % | TEMPERATURE: 98.1 F | RESPIRATION RATE: 16 BRPM

## 2024-02-20 PROCEDURE — G0151 HHCP-SERV OF PT,EA 15 MIN: HCPCS

## 2024-02-20 PROCEDURE — G0300 HHS/HOSPICE OF LPN EA 15 MIN: HCPCS

## 2024-02-20 NOTE — HOME HEALTH
Subjective: Patient's wife notes that patient is always slow moving in the morning.  Falls since last visit No(if yes complete the Fall Tracking Form and include bsrifallreport):   Caregiver involvement changes: no change  Home health supplies by type and quantity ordered/delivered this visit include: n/a    Clinician asked if patient has had any physician contact since last home care visit and patient states: YES  Clinician asked if patient has any new or changed medications and patient states:  NO    If Yes, were medications reconciled? N/A    Was the certifying physician notified of changes in medications? N/A      Clinical assessment (what this visit means for the patient overall and need for ongoing skilled care) and progress or lack of progress towards SPECIFIC goals: Patient was re-instructed on proper sit to stand techniques but needed cues to assist anterior weight shift 50% of the time.  Further strengthening and training needed to improve safety with transfers.  Home exercise program was established today but follow up needed for progression.    interventions:  vitals: WNL  Pain:  WNL  Medications: no changes,patient is taking as prescribed  HEP:  Patient was instructed in a home exercise program and given written instructions in his HH booklet.  Exercises included standing hip abduction 2x10, hamstring curls 2x10, sidestepping at counter 2 laps, and sit/stand from recliner 4 x.  Pt provided verbal teachback.  strengthening:  Pt was instructed in LE strengthening exercises including standing hip abduction 2x10, hamstring curls 2x10, sidestepping at counter 2 laps.  For added proprioceptive challenge pt performed second set of hip abduction with one hand support on counter and CGA.    Gait:  Gait training x 100 ft with rollator. Pt was instructed on taking longer steps.  Transfers: pt was instructed on sit/stand transfers from recliner.  he was instructed on standing up \"like a plane\" to emphasize forward

## 2024-02-21 ENCOUNTER — HOME CARE VISIT (OUTPATIENT)
Facility: HOME HEALTH | Age: 84
End: 2024-02-21
Payer: MEDICARE

## 2024-02-21 PROCEDURE — G0158 HHC OT ASSISTANT EA 15: HCPCS

## 2024-02-21 NOTE — HOME HEALTH
Subjective: has a cough in the morning no pain today  Falls since last visit No(if yes complete the Fall Tracking Form and include bsrifallreport):   Caregiver involvement changes: no  Home health supplies by type and quantity ordered/delivered this visit include: na    Clinician asked if patient has had any physician contact since last home care visit and patient states: NO  Clinician asked if patient has any new or changed medications and patient states:  N/A   If Yes, were medications reconciled? N/A   Was the certifying physician notified of changes in medications? N/A     Clinical assessment (what this visit means for the patient overall and need for ongoing skilled care) and progress or lack of progress towards SPECIFIC goals: instructed on safery precautions.    Written Teaching Material Utilized: verbal    Interdisciplinary communication with:ernie    Discharge planning as follows: When goals are met    Specific plan for next visit:assessment

## 2024-02-21 NOTE — HOME HEALTH
Subjective: pt reporting willing to shower  Falls since last visit (if yes complete the Fall Tracking Form and include bsrifallreport): No   Caregiver involvement changes: No   Home health supplies by type and quantity ordered/delivered this visit include: no   Clinician asked if patient has had any physician contact since last home care visit and patient states: No   Clinician asked if patient has any new or changed medications and patient states: No   If Yes, were medications reconciled? N/a   Was the certifying physician notified of changes in medications? n/a   Clinical assessment (what this visit means for the patient overall and need for ongoing skilled care) and progress or lack of progress towards SPECIFIC goals: Patient progressing towards ADL goals, required SBA for shower transfer, min a for bathing, routine and distant supervision for dressing to ensure patient stays on track and completes in a timely manner. Wife is understanding of extended time. It may need to take for ADLs, but is able to provide assistance as needed, but will continue to benefit from skilled OT services for caregiver training on maximizing. Patient's independence, establishing HEP, and ensuring safe balance during all ambulation and transfers  Written Teaching Material Utilized: no   Interdisciplinary communication with: no   Discharge planning as follows: Per physician order, Will discharge when the patient has reached their maximum functional potential and maximum safety in their home and When goals are met   Specific plan for next visit: ADL safety training

## 2024-02-22 ENCOUNTER — HOME CARE VISIT (OUTPATIENT)
Facility: HOME HEALTH | Age: 84
End: 2024-02-22
Payer: MEDICARE

## 2024-02-22 VITALS
SYSTOLIC BLOOD PRESSURE: 130 MMHG | TEMPERATURE: 98 F | OXYGEN SATURATION: 98 % | RESPIRATION RATE: 16 BRPM | DIASTOLIC BLOOD PRESSURE: 90 MMHG | HEART RATE: 70 BPM

## 2024-02-22 PROCEDURE — G0151 HHCP-SERV OF PT,EA 15 MIN: HCPCS

## 2024-02-22 ASSESSMENT — ENCOUNTER SYMPTOMS: PAIN LOCATION - PAIN QUALITY: SHARP

## 2024-02-22 NOTE — HOME HEALTH
Subjective: Patient and his wife state that he hasn't done any of his home exercises.  Pt states he did about 15 lengths of walking this morning.    Falls since last visit No(if yes complete the Fall Tracking Form and include bsrifallreport):   Caregiver involvement changes: no change  Home health supplies by type and quantity ordered/delivered this visit include: n/a    Clinician asked if patient has had any physician contact since last home care visit and patient states: NO  Clinician asked if patient has any new or changed medications and patient states:  NO    If Yes, were medications reconciled? YES    Was the certifying physician notified of changes in medications? N/A      Clinical assessment (what this visit means for the patient overall and need for ongoing skilled care) and progress or lack of progress towards SPECIFIC goals: Patient demonstrates improved technique with transfers after performing exercises for LE strengthening and balance training.  Pt and caregiver both verbalized understanding of importance of him doing his exercisesd daily.  Followup needed for progression of gait training, and to improve safety in the home.    Written Teaching Material Utilized: N/A    Interdisciplinary communication with: N/A     Discharge planning as follows: Will discharge when the patient has reached their maximum functional potential and maximum safety in their home and When goals are met    Specific plan for next visit: progress LE strengthening and static/dynamic balance training

## 2024-02-23 ENCOUNTER — HOME CARE VISIT (OUTPATIENT)
Facility: HOME HEALTH | Age: 84
End: 2024-02-23
Payer: MEDICARE

## 2024-02-23 PROCEDURE — G0299 HHS/HOSPICE OF RN EA 15 MIN: HCPCS

## 2024-02-24 VITALS
SYSTOLIC BLOOD PRESSURE: 110 MMHG | TEMPERATURE: 97.4 F | RESPIRATION RATE: 18 BRPM | DIASTOLIC BLOOD PRESSURE: 64 MMHG | OXYGEN SATURATION: 100 % | HEART RATE: 70 BPM

## 2024-02-24 NOTE — HOME HEALTH
Subjective: \"I'm doing ok.\"  Falls since last visit No(if yes complete the Fall Tracking Form and include bsrifallreport):   Caregiver involvement changes: no  Home health supplies by type and quantity ordered/delivered this visit include: n/a    Clinician asked if patient has had any physician contact since last home care visit and patient states: NO  Clinician asked if patient has any new or changed medications and patient states:  NO   If Yes, were medications reconciled? N/A   Was the certifying physician notified of changes in medications? N/A     Clinical assessment (what this visit means for the patient overall and need for ongoing skilled care) and progress or lack of progress towards SPECIFIC goals: Family reported increase in confusion in patient within the past few weeks. SN discussed s/s of dementia and coping mechanisms that can assist patient and family, see interventions for details. Family reported patient having trouble sleeping through the night, Melatonin or another sleep aide may help.Assessment complete, vital signs within range, all questions answered for this visit. Patient in continued need of nursing care for disease process and medication education.      Written Teaching Material Utilized: resource pages for adult day cares    Interdisciplinary communication with:Jeovany Barrientos MD; Cherelle Anguiano RN; Maria Del Carmen cOampo, PT; Cherry Dobbins LPN; Tristen Guerrero OT   for the purpose of POC collaboration    Discharge planning as follows: When goals are met    Specific plan for next visit: Assess mentation, sleep

## 2024-02-26 ENCOUNTER — HOME CARE VISIT (OUTPATIENT)
Facility: HOME HEALTH | Age: 84
End: 2024-02-26
Payer: MEDICARE

## 2024-02-26 VITALS
SYSTOLIC BLOOD PRESSURE: 104 MMHG | OXYGEN SATURATION: 100 % | WEIGHT: 169.8 LBS | HEART RATE: 70 BPM | DIASTOLIC BLOOD PRESSURE: 70 MMHG | RESPIRATION RATE: 16 BRPM | TEMPERATURE: 97.7 F | BODY MASS INDEX: 23.03 KG/M2

## 2024-02-26 PROCEDURE — G0158 HHC OT ASSISTANT EA 15: HCPCS

## 2024-02-26 NOTE — HOME HEALTH
Subjective: Pt doing is laps around home with TYRESE arrival  Falls since last visit (if yes complete the Fall Tracking Form and include bsrifallreport): No   Caregiver involvement changes: No   Home health supplies by type and quantity ordered/delivered this visit include: no   Clinician asked if patient has had any physician contact since last home care visit and patient states: No   Clinician asked if patient has any new or changed medications and patient states: No   If Yes, were medications reconciled? N/a   Was the certifying physician notified of changes in medications? n/a   Clinical assessment (what this visit means for the patient overall and need for ongoing skilled care) and progress or lack of progress towards SPECIFIC goals: Established UB HEP progress wotwards strength goal; goal not met and will need progression and follow up training for ADL and transfers safety  Written Teaching Material Utilized: UB HEP  Interdisciplinary communication with: no   Discharge planning as follows: Per physician order, Will discharge when the patient has reached their maximum functional potential and maximum safety in their home and When goals are met   Specific plan for next visit: ADL safety training

## 2024-02-27 ENCOUNTER — HOME CARE VISIT (OUTPATIENT)
Dept: HOME HEALTH SERVICES | Facility: HOME HEALTH | Age: 84
End: 2024-02-27
Payer: MEDICARE

## 2024-02-27 ENCOUNTER — HOME CARE VISIT (OUTPATIENT)
Facility: HOME HEALTH | Age: 84
End: 2024-02-27
Payer: MEDICARE

## 2024-02-27 VITALS
TEMPERATURE: 97.8 F | HEART RATE: 70 BPM | OXYGEN SATURATION: 99 % | DIASTOLIC BLOOD PRESSURE: 80 MMHG | RESPIRATION RATE: 16 BRPM | SYSTOLIC BLOOD PRESSURE: 120 MMHG

## 2024-02-27 PROBLEM — F01.B0 MODERATE VASCULAR DEMENTIA WITHOUT BEHAVIORAL DISTURBANCE, PSYCHOTIC DISTURBANCE, MOOD DISTURBANCE, OR ANXIETY (HCC): Status: ACTIVE | Noted: 2024-02-27

## 2024-02-27 PROCEDURE — G0151 HHCP-SERV OF PT,EA 15 MIN: HCPCS

## 2024-02-27 NOTE — HOME HEALTH
Subjective: Patient's wife notes that patient was reporting some dizziness this morning when doing his walking.  She checked his bloodpressure afterwards and it was 120/70.   Falls since last visit No(if yes complete the Fall Tracking Form and include bsrifallreport):   Caregiver involvement changes: no change  Home health supplies by type and quantity ordered/delivered this visit include: n/a    Clinician asked if patient has had any physician contact since last home care visit and patient states: NO  Clinician asked if patient has any new or changed medications and patient states:  NO    If Yes, were medications reconciled? N/A    Was the certifying physician notified of changes in medications? N/A      Clinical assessment (what this visit means for the patient overall and need for ongoing skilled care) and progress or lack of progress towards SPECIFIC goals: Patient is demonstrating improving endurance for walking but continues to have posterior weight shift with transfers, causing him to fall back into chairs and have difficulty with standing up.  Further training needed to retrain these squatting/transfer strategies and balance.      Written Teaching Material Utilized: HEP progressed    Interdisciplinary communication with: N/A    Discharge planning as follows: Will discharge when the patient has reached their maximum functional potential and maximum safety in their home and When goals are met2  Specific plan for next visit: re-train and reinforce proper transfer strategies and anterior weight shift with squatting.

## 2024-02-28 ENCOUNTER — HOME CARE VISIT (OUTPATIENT)
Facility: HOME HEALTH | Age: 84
End: 2024-02-28
Payer: MEDICARE

## 2024-02-28 VITALS
TEMPERATURE: 97.8 F | RESPIRATION RATE: 16 BRPM | DIASTOLIC BLOOD PRESSURE: 70 MMHG | HEART RATE: 72 BPM | SYSTOLIC BLOOD PRESSURE: 110 MMHG | WEIGHT: 171.4 LBS | BODY MASS INDEX: 23.25 KG/M2 | OXYGEN SATURATION: 100 %

## 2024-02-28 PROCEDURE — G0158 HHC OT ASSISTANT EA 15: HCPCS

## 2024-02-28 PROCEDURE — G0299 HHS/HOSPICE OF RN EA 15 MIN: HCPCS

## 2024-02-28 NOTE — HOME HEALTH
Subjective: im doing good this morning  Falls since last visit (if yes complete the Fall Tracking Form and include bsrifallreport): No   Caregiver involvement changes: No   Home health supplies by type and quantity ordered/delivered this visit include: no   Clinician asked if patient has had any physician contact since last home care visit and patient states: No   Clinician asked if patient has any new or changed medications and patient states: No   If Yes, were medications reconciled? N/a   Was the certifying physician notified of changes in medications? n/a   Clinical assessment (what this visit means for the patient overall and need for ongoing skilled care) and progress or lack of progress towards SPECIFIC goals: Patient making significant progress towards functional performance with safety and independence with ADLs. Patient demonstrating more clarity with cognition today, but will continue to require supervision level for tasks for safety. Progression with strength, training, goal and carryover with caregivers and will continue to address skilled OT services once new DME has arrived next week for shower transfer training.  TYRESE providing suggestions for modifications and travel tips as wife had reported in the next four months they are hoping to take a road trip, but unsure about which equipment may be feasible to pack up and take for the car and worried about car transfers that would be beneficial for TYRESE to address for easier access to community Activity, MD appointments and leisure for improving quality of life  Written Teaching Material Utilized: no   Interdisciplinary communication with: no   Discharge planning as follows: Per physician order, Will discharge when the patient has reached their maximum functional potential and maximum safety in their home and When goals are met   Specific plan for next visit: ADL safety training

## 2024-02-29 ENCOUNTER — HOME CARE VISIT (OUTPATIENT)
Facility: HOME HEALTH | Age: 84
End: 2024-02-29
Payer: MEDICARE

## 2024-02-29 VITALS
OXYGEN SATURATION: 97 % | SYSTOLIC BLOOD PRESSURE: 110 MMHG | TEMPERATURE: 97.7 F | DIASTOLIC BLOOD PRESSURE: 78 MMHG | HEART RATE: 70 BPM | RESPIRATION RATE: 20 BRPM

## 2024-02-29 VITALS
HEART RATE: 66 BPM | TEMPERATURE: 97.9 F | OXYGEN SATURATION: 99 % | WEIGHT: 171.6 LBS | DIASTOLIC BLOOD PRESSURE: 60 MMHG | BODY MASS INDEX: 23.27 KG/M2 | SYSTOLIC BLOOD PRESSURE: 110 MMHG | RESPIRATION RATE: 16 BRPM

## 2024-02-29 PROCEDURE — G0151 HHCP-SERV OF PT,EA 15 MIN: HCPCS

## 2024-02-29 ASSESSMENT — ENCOUNTER SYMPTOMS: CONSTIPATION: 1

## 2024-02-29 NOTE — HOME HEALTH
Subjective: I  dont have any pain.  Falls since last visit No(if yes complete the Fall Tracking Form and include bsrifallreport):   Caregiver involvement changes: NO  Home health supplies by type and quantity ordered/delivered this visit include: N/A    Clinician asked if patient has had any physician contact since last home care visit and patient states: NO  Clinician asked if patient has any new or changed medications and patient states:  NO   If Yes, were medications reconciled? NO   Was the certifying physician notified of changes in medications? NO     Clinical assessment (what this visit means for the patient overall and need for ongoing skilled care) and progress or lack of progress towards SPECIFIC goals:Pt with Dementia and CKD. Currently meeting CKD goals as pt is taking all medications as prescribed and still continues to have good urine output as per wife. Wife currently  denies any renal complications. Pt is alert but confused during assessment as he just woke up from his nap. He was able to tell me his name and  but that was about it. Physical assessment was negative besides bilateral foot swelling. Wife educated on feet elevation. SN will see pt once more and then  plain for discharge.        Interdisciplinary communication with: N/A     Discharge planning as follows: When  pt Is no longer homebound, Per physician order and When goals are met    Specific plan for next visit: medication education

## 2024-02-29 NOTE — HOME HEALTH
Subjective: Patient's wife notes that patient was a little slow moving this morning.  Falls since last visit No(if yes complete the Fall Tracking Form and include bsrifallreport):   Caregiver involvement changes: no change  Home health supplies by type and quantity ordered/delivered this visit include: n/a    Clinician asked if patient has had any physician contact since last home care visit and patient states: NO  Clinician asked if patient has any new or changed medications and patient states:  NO    If Yes, were medications reconciled? N/A    Was the certifying physician notified of changes in medications? N/A      Clinical assessment (what this visit means for the patient overall and need for ongoing skilled care) and progress or lack of progress towards SPECIFIC goals: Patient is progressing in safety and awareness with ambulation indoors.  He is also demonstrating improved balance strategies in standing without hand support.  He continues to need further repetition and training on transfer strategies.     Written Teaching Material Utilized: written HEP    Interdisciplinary communication with: N/A     Discharge planning as follows: Will discharge when the patient has reached their maximum functional potential and maximum safety in their home and When goals are met    Specific plan for next visit: progress transfer training and balance training to decrease risk of falls.

## 2024-03-04 ENCOUNTER — HOME CARE VISIT (OUTPATIENT)
Facility: HOME HEALTH | Age: 84
End: 2024-03-04
Payer: MEDICARE

## 2024-03-04 VITALS
DIASTOLIC BLOOD PRESSURE: 62 MMHG | TEMPERATURE: 98.1 F | HEART RATE: 70 BPM | SYSTOLIC BLOOD PRESSURE: 106 MMHG | OXYGEN SATURATION: 98 % | RESPIRATION RATE: 16 BRPM

## 2024-03-04 VITALS
TEMPERATURE: 97.9 F | OXYGEN SATURATION: 99 % | SYSTOLIC BLOOD PRESSURE: 120 MMHG | HEART RATE: 69 BPM | DIASTOLIC BLOOD PRESSURE: 80 MMHG | RESPIRATION RATE: 16 BRPM

## 2024-03-04 PROCEDURE — G0151 HHCP-SERV OF PT,EA 15 MIN: HCPCS

## 2024-03-04 PROCEDURE — G0158 HHC OT ASSISTANT EA 15: HCPCS

## 2024-03-04 NOTE — HOME HEALTH
Subjective: Patient's wife states that he practiced getting in/out of the car with OT today.  No major changes over the weekend.  Falls since last visit No(if yes complete the Fall Tracking Form and include bsrifallreport):   Caregiver involvement changes: no changd  Home health supplies by type and quantity ordered/delivered this visit include: n/a    Clinician asked if patient has had any physician contact since last home care visit and patient states: NO  Clinician asked if patient has any new or changed medications and patient states:  NO    If Yes, were medications reconciled? N/A    Was the certifying physician notified of changes in medications? N/A      Clinical assessment (what this visit means for the patient overall and need for ongoing skilled care) and progress or lack of progress towards SPECIFIC goals: Patient has achieved ambulation and stair goals as well as pressure releif goal.  He needs followup to ensure carryover of transfer strategies.  he will be ready for discharge at next visit, as all goals should be achieved and patient is reaching his baseline.     Written Teaching Material Utilized: LILIBETH signed    Interdisciplinary communication with: Cherelle Anguiano RN and Tristen Guerrero OT for the purpose of POC collaboration    Discharge planning as follows: When goals are met    Specific plan for next visit: progress transfer training at next visit.

## 2024-03-04 NOTE — HOME HEALTH
Subjective: I feel pretty good today  Falls since last visit (if yes complete the Fall Tracking Form and include bsrifallreport): No   Caregiver involvement changes: No   Home health supplies by type and quantity ordered/delivered this visit include: no   Clinician asked if patient has had any physician contact since last home care visit and patient states: No   Clinician asked if patient has any new or changed medications and patient states: No   If Yes, were medications reconciled? N/a   Was the certifying physician notified of changes in medications? n/a   Clinical assessment (what this visit means for the patient overall and need for ongoing skilled care) and progress or lack of progress towards SPECIFIC goals: Patient progressing well with all goals with new shower chair with arms and handheld showerhead pace demonstrating increase independence with shower routine, SBA for transfer. Completed car transverse today with SBA as well. Wife and patient voice he is feeling much stronger and overall clear to complete ADLs. Due to cognition, still requires distant supervision or verbal queuing for sequencing/initiation/termination of tasks.  Written Teaching Material Utilized: no   Interdisciplinary communication with: OTR on pt progress  Discharge planning as follows: Per physician order, Will discharge when the patient has reached their maximum functional potential and maximum safety in their home and When goals are met   Specific plan for next visit: OT reassessment /discharge

## 2024-03-06 ENCOUNTER — HOME CARE VISIT (OUTPATIENT)
Facility: HOME HEALTH | Age: 84
End: 2024-03-06
Payer: MEDICARE

## 2024-03-06 ENCOUNTER — HOME CARE VISIT (OUTPATIENT)
Dept: HOME HEALTH SERVICES | Facility: HOME HEALTH | Age: 84
End: 2024-03-06
Payer: MEDICARE

## 2024-03-06 VITALS
TEMPERATURE: 98.1 F | DIASTOLIC BLOOD PRESSURE: 78 MMHG | HEART RATE: 70 BPM | OXYGEN SATURATION: 99 % | RESPIRATION RATE: 16 BRPM | SYSTOLIC BLOOD PRESSURE: 118 MMHG

## 2024-03-06 VITALS
SYSTOLIC BLOOD PRESSURE: 106 MMHG | OXYGEN SATURATION: 100 % | HEART RATE: 70 BPM | BODY MASS INDEX: 23.46 KG/M2 | DIASTOLIC BLOOD PRESSURE: 65 MMHG | RESPIRATION RATE: 18 BRPM | WEIGHT: 173 LBS | TEMPERATURE: 97.8 F

## 2024-03-06 PROCEDURE — G0152 HHCP-SERV OF OT,EA 15 MIN: HCPCS

## 2024-03-06 PROCEDURE — G0299 HHS/HOSPICE OF RN EA 15 MIN: HCPCS

## 2024-03-06 PROCEDURE — G0151 HHCP-SERV OF PT,EA 15 MIN: HCPCS

## 2024-03-06 ASSESSMENT — ENCOUNTER SYMPTOMS
PAIN LOCATION - PAIN QUALITY: SORE, ACHING
CONSTIPATION: 1

## 2024-03-06 NOTE — HOME HEALTH
list present in the home at discharge, Keep all scheduled medical appointments, Wash hands frequently to control the spread of infection, Follow safety and fall prevention education to prevent falls and Continue home exercises as instructed by your therapist    Call physician for: continuous pain, abnormal bleeding, high fever over 100.4 degrees, increased pain, new problem and frequent falls    The patient/caregiver expressed knowledge and understanding of Discharge Instructions      Interdisciplinary communication with: Cherelle Polanco RN for the purpose of POC collaboration and co-treatment today.

## 2024-03-06 NOTE — HOME HEALTH
Subjective: I am doing much better  Falls since last visit No(if yes complete the Fall Tracking Form and include bsrifallreport):   Caregiver involvement changes: No changes to caregiver involvement  Home health supplies by type and quantity ordered/delivered this visit include: NA    Clinician asked if patient has had any physician contact since last home care visit and patient states: NO  Clinician asked if patient has any new or changed medications and patient states:  NO   If Yes, were medications reconciled? NA  Was the certifying physician notified of changes in medications? N/A     Clinical assessment (what this visit means for the patient overall and need for ongoing skilled care) and progress or lack of progress towards SPECIFIC goals: OT assessment completed for discharge as pt has progressed with goals. At initial assessment pt required mod assist for bathing due to fear of falling, min assist for toileting and LB dressing, CGA for UB dressing and grooming tasks. At discharge pt is completing bathing with use of shower chair with supervision, dressing, grooming, toileting supervision. Barthel score has improved from 55/100 to 75/100. Strength has improved from 3/5 MMT in BUEs to 3+/5 MMT with caregiver demonstrating 100% carryover of HEP. At this time, pt discharged from OT services with goals met.    Written Teaching Material Utilized: AMILCAR    Interdisciplinary communication with: Discussed discharge with PT, SN, TYRESE    Discharge planning as follows: ANNIKA this session    Specific plan for next visit: AMILCAR

## 2024-03-07 VITALS
DIASTOLIC BLOOD PRESSURE: 78 MMHG | TEMPERATURE: 98.1 F | HEART RATE: 70 BPM | SYSTOLIC BLOOD PRESSURE: 118 MMHG | OXYGEN SATURATION: 99 %

## 2024-03-07 NOTE — HOME HEALTH
medical appointments, Wash hands frequently to control the spread of infection, Follow safety and fall prevention education to prevent falls and Continue home exercises as instructed by your therapist    Call physician for: continuous pain, abnormal bleeding, high fever over 100.4 degrees, increased pain, new problem and frequent falls    The patient/caregiver expressed knowledge and understanding of Discharge Instructions      Interdisciplinary communication with: N/A

## 2024-03-25 ENCOUNTER — OFFICE VISIT (OUTPATIENT)
Facility: CLINIC | Age: 84
End: 2024-03-25
Payer: MEDICARE

## 2024-03-25 VITALS
DIASTOLIC BLOOD PRESSURE: 50 MMHG | TEMPERATURE: 97.8 F | OXYGEN SATURATION: 100 % | HEIGHT: 74 IN | HEART RATE: 69 BPM | SYSTOLIC BLOOD PRESSURE: 82 MMHG | WEIGHT: 178.5 LBS | BODY MASS INDEX: 22.91 KG/M2 | RESPIRATION RATE: 16 BRPM

## 2024-03-25 DIAGNOSIS — R79.89 PRERENAL AZOTEMIA: Primary | ICD-10-CM

## 2024-03-25 DIAGNOSIS — F01.B0 MODERATE VASCULAR DEMENTIA WITHOUT BEHAVIORAL DISTURBANCE, PSYCHOTIC DISTURBANCE, MOOD DISTURBANCE, OR ANXIETY (HCC): ICD-10-CM

## 2024-03-25 DIAGNOSIS — I25.5 ISCHEMIC CARDIOMYOPATHY: ICD-10-CM

## 2024-03-25 DIAGNOSIS — E44.1 MILD PROTEIN-CALORIE MALNUTRITION (HCC): ICD-10-CM

## 2024-03-25 DIAGNOSIS — N18.31 STAGE 3A CHRONIC KIDNEY DISEASE (HCC): ICD-10-CM

## 2024-03-25 PROBLEM — N31.9 NEUROMUSCULAR DYSFUNCTION OF BLADDER, UNSPECIFIED: Status: ACTIVE | Noted: 2024-01-22

## 2024-03-25 PROBLEM — E46 PROTEIN-CALORIE MALNUTRITION (HCC): Status: ACTIVE | Noted: 2024-01-19

## 2024-03-25 PROBLEM — I63.9 CEREBRAL INFARCTION (HCC): Status: ACTIVE | Noted: 2024-03-25

## 2024-03-25 PROBLEM — N40.0 BENIGN PROSTATIC HYPERPLASIA WITHOUT LOWER URINARY TRACT SYMPTOMS: Status: ACTIVE | Noted: 2024-01-22

## 2024-03-25 PROBLEM — E78.5 HYPERLIPIDEMIA, UNSPECIFIED: Status: ACTIVE | Noted: 2024-01-22

## 2024-03-25 PROBLEM — M19.90 UNSPECIFIED OSTEOARTHRITIS, UNSPECIFIED SITE: Status: ACTIVE | Noted: 2024-01-22

## 2024-03-25 PROBLEM — Q61.3 POLYCYSTIC KIDNEY, UNSPECIFIED: Status: ACTIVE | Noted: 2024-01-22

## 2024-03-25 PROBLEM — I42.9 CARDIOMYOPATHY (HCC): Status: ACTIVE | Noted: 2024-03-25

## 2024-03-25 PROCEDURE — G8427 DOCREV CUR MEDS BY ELIG CLIN: HCPCS | Performed by: INTERNAL MEDICINE

## 2024-03-25 PROCEDURE — G8420 CALC BMI NORM PARAMETERS: HCPCS | Performed by: INTERNAL MEDICINE

## 2024-03-25 PROCEDURE — 1123F ACP DISCUSS/DSCN MKR DOCD: CPT | Performed by: INTERNAL MEDICINE

## 2024-03-25 PROCEDURE — 99214 OFFICE O/P EST MOD 30 MIN: CPT | Performed by: INTERNAL MEDICINE

## 2024-03-25 PROCEDURE — 3078F DIAST BP <80 MM HG: CPT | Performed by: INTERNAL MEDICINE

## 2024-03-25 PROCEDURE — 1036F TOBACCO NON-USER: CPT | Performed by: INTERNAL MEDICINE

## 2024-03-25 PROCEDURE — G8484 FLU IMMUNIZE NO ADMIN: HCPCS | Performed by: INTERNAL MEDICINE

## 2024-03-25 PROCEDURE — 3074F SYST BP LT 130 MM HG: CPT | Performed by: INTERNAL MEDICINE

## 2024-03-25 SDOH — ECONOMIC STABILITY: INCOME INSECURITY: HOW HARD IS IT FOR YOU TO PAY FOR THE VERY BASICS LIKE FOOD, HOUSING, MEDICAL CARE, AND HEATING?: NOT HARD AT ALL

## 2024-03-25 SDOH — ECONOMIC STABILITY: HOUSING INSECURITY
IN THE LAST 12 MONTHS, WAS THERE A TIME WHEN YOU DID NOT HAVE A STEADY PLACE TO SLEEP OR SLEPT IN A SHELTER (INCLUDING NOW)?: NO

## 2024-03-25 SDOH — ECONOMIC STABILITY: FOOD INSECURITY: WITHIN THE PAST 12 MONTHS, THE FOOD YOU BOUGHT JUST DIDN'T LAST AND YOU DIDN'T HAVE MONEY TO GET MORE.: NEVER TRUE

## 2024-03-25 SDOH — ECONOMIC STABILITY: FOOD INSECURITY: WITHIN THE PAST 12 MONTHS, YOU WORRIED THAT YOUR FOOD WOULD RUN OUT BEFORE YOU GOT MONEY TO BUY MORE.: NEVER TRUE

## 2024-03-25 ASSESSMENT — PATIENT HEALTH QUESTIONNAIRE - PHQ9
7. TROUBLE CONCENTRATING ON THINGS, SUCH AS READING THE NEWSPAPER OR WATCHING TELEVISION: NOT AT ALL
SUM OF ALL RESPONSES TO PHQ QUESTIONS 1-9: 0
9. THOUGHTS THAT YOU WOULD BE BETTER OFF DEAD, OR OF HURTING YOURSELF: NOT AT ALL
3. TROUBLE FALLING OR STAYING ASLEEP: NOT AT ALL
2. FEELING DOWN, DEPRESSED OR HOPELESS: NOT AT ALL
SUM OF ALL RESPONSES TO PHQ9 QUESTIONS 1 & 2: 0
5. POOR APPETITE OR OVEREATING: NOT AT ALL
10. IF YOU CHECKED OFF ANY PROBLEMS, HOW DIFFICULT HAVE THESE PROBLEMS MADE IT FOR YOU TO DO YOUR WORK, TAKE CARE OF THINGS AT HOME, OR GET ALONG WITH OTHER PEOPLE: NOT DIFFICULT AT ALL
1. LITTLE INTEREST OR PLEASURE IN DOING THINGS: NOT AT ALL
SUM OF ALL RESPONSES TO PHQ QUESTIONS 1-9: 0
4. FEELING TIRED OR HAVING LITTLE ENERGY: NOT AT ALL
6. FEELING BAD ABOUT YOURSELF - OR THAT YOU ARE A FAILURE OR HAVE LET YOURSELF OR YOUR FAMILY DOWN: NOT AT ALL
8. MOVING OR SPEAKING SO SLOWLY THAT OTHER PEOPLE COULD HAVE NOTICED. OR THE OPPOSITE, BEING SO FIGETY OR RESTLESS THAT YOU HAVE BEEN MOVING AROUND A LOT MORE THAN USUAL: NOT AT ALL
SUM OF ALL RESPONSES TO PHQ QUESTIONS 1-9: 0
SUM OF ALL RESPONSES TO PHQ QUESTIONS 1-9: 0

## 2024-03-25 ASSESSMENT — ANXIETY QUESTIONNAIRES
3. WORRYING TOO MUCH ABOUT DIFFERENT THINGS: NOT AT ALL
7. FEELING AFRAID AS IF SOMETHING AWFUL MIGHT HAPPEN: NOT AT ALL
4. TROUBLE RELAXING: NOT AT ALL
6. BECOMING EASILY ANNOYED OR IRRITABLE: NOT AT ALL
1. FEELING NERVOUS, ANXIOUS, OR ON EDGE: NOT AT ALL
IF YOU CHECKED OFF ANY PROBLEMS ON THIS QUESTIONNAIRE, HOW DIFFICULT HAVE THESE PROBLEMS MADE IT FOR YOU TO DO YOUR WORK, TAKE CARE OF THINGS AT HOME, OR GET ALONG WITH OTHER PEOPLE: NOT DIFFICULT AT ALL
GAD7 TOTAL SCORE: 0
2. NOT BEING ABLE TO STOP OR CONTROL WORRYING: NOT AT ALL
5. BEING SO RESTLESS THAT IT IS HARD TO SIT STILL: NOT AT ALL

## 2024-03-25 NOTE — PROGRESS NOTES
Chief Complaint   Patient presents with    Follow-up   Patient states \" he is having a lot of joint pain and right toe pain\"  \"Have you been to the ER, urgent care clinic since your last visit?  Hospitalized since your last visit?\"    NO    “Have you seen or consulted any other health care providers [facility] since your last visit?”    NO            Click Here for Release of Records Request

## 2024-03-26 LAB
ANION GAP SERPL CALC-SCNC: 3 MMOL/L (ref 5–15)
BUN SERPL-MCNC: 34 MG/DL (ref 6–20)
BUN/CREAT SERPL: 15 (ref 12–20)
CALCIUM SERPL-MCNC: 9.4 MG/DL (ref 8.5–10.1)
CHLORIDE SERPL-SCNC: 113 MMOL/L (ref 97–108)
CO2 SERPL-SCNC: 28 MMOL/L (ref 21–32)
CREAT SERPL-MCNC: 2.25 MG/DL (ref 0.7–1.3)
GLUCOSE SERPL-MCNC: 84 MG/DL (ref 65–100)
POTASSIUM SERPL-SCNC: 4.5 MMOL/L (ref 3.5–5.1)
SODIUM SERPL-SCNC: 144 MMOL/L (ref 136–145)

## 2024-04-03 NOTE — PROGRESS NOTES
[facility]  [address] [place] [address]  [address]  [facility] [address]  Phone:  [phone]  Fax: [phone]       Chief Complaint   Patient presents with    Follow-up   .      SUBJECTIVE:    Jone Meneses is a 83 y.o. male  Dictation on: 04/02/2024  9:10 PM by: SILVINO ONEIL [[id number]]          Current Outpatient Medications   Medication Sig Dispense Refill    dorzolamide (TRUSOPT) 2 % ophthalmic solution Place 1 drop into both eyes 3 times daily place 1 drop in both eyes twice a day      solifenacin (VESICARE) 5 MG tablet Take 1 tablet by mouth daily Take 1 tablet by mouth daily      doxycycline hyclate (VIBRAMYCIN) 100 MG capsule Take 1 capsule by mouth 2 times daily      brimonidine (ALPHAGAN) 0.2 % ophthalmic solution Place 1 drop into both eyes 3 times daily Indications: Glaucoma      latanoprost (XALATAN) 0.005 % ophthalmic solution Place 1 drop into both eyes every evening      acetaminophen (TYLENOL) 500 MG tablet Take 1 tablet by mouth every 6 hours as needed for Pain (mild pain leven 1-5)      Metoprolol Succinate 100 MG CS24 Take 100 mg by mouth daily.      Multiple Vitamins-Minerals (CENTRUM ADULTS PO) Take 1 tablet by mouth daily.      Omega-3 Fatty Acids (OMEGA 3 PO) Take 1 capsule by mouth daily.      furosemide (LASIX) 40 MG tablet Take 1 tablet by mouth daily      hydrocortisone 1 % cream Apply 1 g topically 2 times daily      lactulose (CHRONULAC) 10 GM/15ML solution Take 30 mLs by mouth 3 times daily as needed (constipation) 237 mL 4    memantine ER (NAMENDA XR) 28 MG CP24 extended release capsule TAKE 1 CAPSULE BY MOUTH EVERY DAY 90 capsule 3    FLUoxetine (PROZAC) 10 MG capsule Take 1 capsule by mouth daily 90 capsule 3    salsalate (DISALCID) 500 MG tablet TAKE 1 TABLET BY MOUTH TWICE A  tablet 3    dutasteride-tamsulosin 0.5-0.4 MG CAPS TAKE 1 CAPSULE BY MOUTH EVERY DAY 90 capsule 3    pravastatin (PRAVACHOL) 80 MG tablet TAKE 1 TABLET BY MOUTH EVERYDAY AT

## 2024-04-03 NOTE — PROGRESS NOTES
comes in for return visit stating that he has been doing reasonably well.  Most of if not all of the history is obtained from his wife.  His mobility has improved somewhat so he is able to use his Rollator for walking assistance.      He is not having any more pain in his lower extremities or back as he previously did.    He has a past history of coronary artery disease, primary hypertension, dyslipidemia and mild chronic renal failure which has not been progressive.    His wife asked me to discuss the patient's living will.  I will attempt to do so.

## 2024-04-03 NOTE — PROGRESS NOTES
1. The patient does have a prerenal azotemia related to his lack of adequate liquid intake on a daily basis.  I again emphasized to the patient and wife the importance of having him drink as much liquid as possible.  2. He has progressive dementia, most likely of the Alzheimer's variety, which is getting worse.  His short-term memory is extremely poor.  The conversation that I had with him regarding a living will did not register well.  Unfortunately, this should have been done much earlier and it is a bit late now.  This is actually a decision for the wife and his children to make about what they would like to have done at a time when it is appropriate, particularly as it relates to his living will.  3. He has mild chronic renal failure, and I will await the results of his BMP.  The significant component is a prerenal component.  4. He has mild protein calorie malnutrition, but he is doing reasonably well currently.  5. He has an ischemic cardiomyopathy which is quite stable.  He does follow up with Cardiology.

## 2024-04-27 ENCOUNTER — APPOINTMENT (OUTPATIENT)
Facility: HOSPITAL | Age: 84
DRG: 871 | End: 2024-04-27
Payer: MEDICARE

## 2024-04-27 ENCOUNTER — HOSPITAL ENCOUNTER (INPATIENT)
Facility: HOSPITAL | Age: 84
LOS: 3 days | Discharge: HOSPICE | DRG: 871 | End: 2024-05-01
Attending: EMERGENCY MEDICINE | Admitting: STUDENT IN AN ORGANIZED HEALTH CARE EDUCATION/TRAINING PROGRAM
Payer: MEDICARE

## 2024-04-27 DIAGNOSIS — N17.9 ACUTE RENAL FAILURE, UNSPECIFIED ACUTE RENAL FAILURE TYPE (HCC): ICD-10-CM

## 2024-04-27 DIAGNOSIS — R65.21 SEPTIC SHOCK (HCC): Primary | ICD-10-CM

## 2024-04-27 DIAGNOSIS — A41.9 SEPTIC SHOCK (HCC): Primary | ICD-10-CM

## 2024-04-27 DIAGNOSIS — G93.40 ENCEPHALOPATHY: ICD-10-CM

## 2024-04-27 DIAGNOSIS — Z51.5 PALLIATIVE CARE ENCOUNTER: ICD-10-CM

## 2024-04-27 DIAGNOSIS — K52.9 COLITIS: ICD-10-CM

## 2024-04-27 LAB
ALBUMIN SERPL-MCNC: 3.1 G/DL (ref 3.5–5)
ALBUMIN/GLOB SERPL: 0.7 (ref 1.1–2.2)
ALP SERPL-CCNC: 45 U/L (ref 45–117)
ALT SERPL-CCNC: 32 U/L (ref 12–78)
ANION GAP SERPL CALC-SCNC: 7 MMOL/L (ref 5–15)
AST SERPL-CCNC: 53 U/L (ref 15–37)
BASOPHILS # BLD: 0.3 K/UL (ref 0–0.1)
BASOPHILS NFR BLD: 1 % (ref 0–1)
BILIRUB SERPL-MCNC: 2.3 MG/DL (ref 0.2–1)
BUN SERPL-MCNC: 47 MG/DL (ref 6–20)
BUN/CREAT SERPL: 10 (ref 12–20)
CALCIUM SERPL-MCNC: 8.8 MG/DL (ref 8.5–10.1)
CHLORIDE SERPL-SCNC: 110 MMOL/L (ref 97–108)
CO2 SERPL-SCNC: 21 MMOL/L (ref 21–32)
CREAT SERPL-MCNC: 4.81 MG/DL (ref 0.7–1.3)
DIFFERENTIAL METHOD BLD: ABNORMAL
EOSINOPHIL # BLD: 0.3 K/UL (ref 0–0.4)
EOSINOPHIL NFR BLD: 1 % (ref 0–7)
ERYTHROCYTE [DISTWIDTH] IN BLOOD BY AUTOMATED COUNT: 13.2 % (ref 11.5–14.5)
GLOBULIN SER CALC-MCNC: 4.2 G/DL (ref 2–4)
GLUCOSE SERPL-MCNC: 86 MG/DL (ref 65–100)
HCT VFR BLD AUTO: 44.4 % (ref 36.6–50.3)
HGB BLD-MCNC: 13.6 G/DL (ref 12.1–17)
IMM GRANULOCYTES # BLD AUTO: 0 K/UL (ref 0–0.04)
IMM GRANULOCYTES NFR BLD AUTO: 0 % (ref 0–0.5)
LACTATE BLD-SCNC: 3.67 MMOL/L (ref 0.4–2)
LACTATE BLD-SCNC: 3.84 MMOL/L (ref 0.4–2)
LIPASE SERPL-CCNC: 12 U/L (ref 13–75)
LYMPHOCYTES # BLD: 1.6 K/UL (ref 0.8–3.5)
LYMPHOCYTES NFR BLD: 6 % (ref 12–49)
MCH RBC QN AUTO: 28.4 PG (ref 26–34)
MCHC RBC AUTO-ENTMCNC: 30.6 G/DL (ref 30–36.5)
MCV RBC AUTO: 92.7 FL (ref 80–99)
METAMYELOCYTES NFR BLD MANUAL: 1 %
MONOCYTES # BLD: 3.1 K/UL (ref 0–1)
MONOCYTES NFR BLD: 12 % (ref 5–13)
NEUTS BAND NFR BLD MANUAL: 7 %
NEUTS SEG # BLD: 20.5 K/UL (ref 1.8–8)
NEUTS SEG NFR BLD: 72 % (ref 32–75)
NRBC # BLD: 0 K/UL (ref 0–0.01)
NRBC BLD-RTO: 0 PER 100 WBC
PLATELET # BLD AUTO: 125 K/UL (ref 150–400)
PMV BLD AUTO: 10.7 FL (ref 8.9–12.9)
POTASSIUM SERPL-SCNC: 4.6 MMOL/L (ref 3.5–5.1)
PROT SERPL-MCNC: 7.3 G/DL (ref 6.4–8.2)
RBC # BLD AUTO: 4.79 M/UL (ref 4.1–5.7)
RBC MORPH BLD: ABNORMAL
SODIUM SERPL-SCNC: 138 MMOL/L (ref 136–145)
WBC # BLD AUTO: 26 K/UL (ref 4.1–11.1)

## 2024-04-27 PROCEDURE — 6360000002 HC RX W HCPCS: Performed by: EMERGENCY MEDICINE

## 2024-04-27 PROCEDURE — 99285 EMERGENCY DEPT VISIT HI MDM: CPT

## 2024-04-27 PROCEDURE — 96365 THER/PROPH/DIAG IV INF INIT: CPT

## 2024-04-27 PROCEDURE — 85025 COMPLETE CBC W/AUTO DIFF WBC: CPT

## 2024-04-27 PROCEDURE — 2500000003 HC RX 250 WO HCPCS: Performed by: EMERGENCY MEDICINE

## 2024-04-27 PROCEDURE — 93005 ELECTROCARDIOGRAM TRACING: CPT | Performed by: EMERGENCY MEDICINE

## 2024-04-27 PROCEDURE — 74176 CT ABD & PELVIS W/O CONTRAST: CPT

## 2024-04-27 PROCEDURE — 36415 COLL VENOUS BLD VENIPUNCTURE: CPT

## 2024-04-27 PROCEDURE — 96367 TX/PROPH/DG ADDL SEQ IV INF: CPT

## 2024-04-27 PROCEDURE — 83690 ASSAY OF LIPASE: CPT

## 2024-04-27 PROCEDURE — 70450 CT HEAD/BRAIN W/O DYE: CPT

## 2024-04-27 PROCEDURE — 2580000003 HC RX 258: Performed by: EMERGENCY MEDICINE

## 2024-04-27 PROCEDURE — 82565 ASSAY OF CREATININE: CPT

## 2024-04-27 PROCEDURE — 80053 COMPREHEN METABOLIC PANEL: CPT

## 2024-04-27 PROCEDURE — 83605 ASSAY OF LACTIC ACID: CPT

## 2024-04-27 RX ORDER — METRONIDAZOLE 500 MG/100ML
500 INJECTION, SOLUTION INTRAVENOUS
Status: COMPLETED | OUTPATIENT
Start: 2024-04-27 | End: 2024-04-27

## 2024-04-27 RX ORDER — SODIUM CHLORIDE, SODIUM LACTATE, POTASSIUM CHLORIDE, AND CALCIUM CHLORIDE .6; .31; .03; .02 G/100ML; G/100ML; G/100ML; G/100ML
1000 INJECTION, SOLUTION INTRAVENOUS
Status: COMPLETED | OUTPATIENT
Start: 2024-04-27 | End: 2024-04-27

## 2024-04-27 RX ORDER — DEXTROSE MONOHYDRATE, SODIUM CHLORIDE, AND POTASSIUM CHLORIDE 50; 1.49; 4.5 G/1000ML; G/1000ML; G/1000ML
INJECTION, SOLUTION INTRAVENOUS CONTINUOUS
Status: DISCONTINUED | OUTPATIENT
Start: 2024-04-27 | End: 2024-04-28

## 2024-04-27 RX ADMIN — SODIUM CHLORIDE, POTASSIUM CHLORIDE, SODIUM LACTATE AND CALCIUM CHLORIDE 1000 ML: 600; 310; 30; 20 INJECTION, SOLUTION INTRAVENOUS at 21:38

## 2024-04-27 RX ADMIN — POTASSIUM CHLORIDE, DEXTROSE MONOHYDRATE AND SODIUM CHLORIDE: 150; 5; 450 INJECTION, SOLUTION INTRAVENOUS at 23:59

## 2024-04-27 RX ADMIN — METRONIDAZOLE 500 MG: 500 INJECTION, SOLUTION INTRAVENOUS at 22:54

## 2024-04-27 RX ADMIN — SODIUM CHLORIDE, POTASSIUM CHLORIDE, SODIUM LACTATE AND CALCIUM CHLORIDE 1000 ML: 600; 310; 30; 20 INJECTION, SOLUTION INTRAVENOUS at 21:00

## 2024-04-27 RX ADMIN — SODIUM CHLORIDE 1000 MG: 900 INJECTION INTRAVENOUS at 21:40

## 2024-04-27 ASSESSMENT — PAIN - FUNCTIONAL ASSESSMENT: PAIN_FUNCTIONAL_ASSESSMENT: NONE - DENIES PAIN

## 2024-04-27 NOTE — ED PROVIDER NOTES
Notified of critical lactate of 3.84, suspect in setting of dehydration, not sepsis.  Will continue fluids. [MB]   2031 WBC(!): 26.0 [MB]   2044 D/w radiology, patient's GFR not conducive to contrast, will proceed with non-contrast. [MB]   2045 CBC with leukocytosis, CMP with acute on chronic renal failure. [MB]   2104 Reassessed patient as I was informed patient is blood pressure is low.  Updated family, discussed we will continue fluids and attempt to obtain imaging as this is important for them.  Concerned that patient is in multisystem organ failure and in process of dying. [MB]   2239 CT head unremarkable,  [MB]   2243 CT of the abdomen pelvis shows concern for colitis.  Will add on Flagyl. [MB]   2259 Updated patient and family, discussed CT results and confirmed no escalation of care, continuing fluids and IV abx. Will contact patient's hospice agency [facility] at [phone] [MB]   2336 D/w patient's hospice RN. [MB]   2345 D/w hospitalist for IVF, abx 24 obs, palliative care consult with hopeful transition to home hospice. [MB]      ED Course User Index  [MB] Jared Allen MD     FINAL IMPRESSION     1. Septic shock (HCC)    2. Colitis    3. Acute renal failure, unspecified acute renal failure type (HCC)    4. Encephalopathy    5. Palliative care encounter          DISPOSITION/PLAN     DISPOSITION Decision To Admit 04/27/2024 11:44:03 PM    I am the Primary Clinician of Record.   Jared Allen MD (electronically signed)    (Please note that parts of this dictation were completed with voice recognition software. Quite often unanticipated grammatical, syntax, homophones, and other interpretive errors are inadvertently transcribed by the computer software. Please disregards these errors. Please excuse any errors that have escaped final proofreading.)         Jared Allen MD  04/27/24 3122

## 2024-04-28 PROBLEM — A41.9 SEVERE SEPSIS (HCC): Status: ACTIVE | Noted: 2024-01-01

## 2024-04-28 PROBLEM — R65.20 SEVERE SEPSIS (HCC): Status: ACTIVE | Noted: 2024-01-01

## 2024-04-28 LAB
LACTATE BLD-SCNC: 2.26 MMOL/L (ref 0.4–2)
LACTATE BLD-SCNC: 2.6 MMOL/L (ref 0.4–2)

## 2024-04-28 PROCEDURE — 83605 ASSAY OF LACTIC ACID: CPT

## 2024-04-28 PROCEDURE — 2580000003 HC RX 258: Performed by: STUDENT IN AN ORGANIZED HEALTH CARE EDUCATION/TRAINING PROGRAM

## 2024-04-28 PROCEDURE — 6370000000 HC RX 637 (ALT 250 FOR IP): Performed by: STUDENT IN AN ORGANIZED HEALTH CARE EDUCATION/TRAINING PROGRAM

## 2024-04-28 PROCEDURE — 6360000002 HC RX W HCPCS: Performed by: GENERAL ACUTE CARE HOSPITAL

## 2024-04-28 PROCEDURE — 1100000000 HC RM PRIVATE

## 2024-04-28 PROCEDURE — 6360000002 HC RX W HCPCS: Performed by: STUDENT IN AN ORGANIZED HEALTH CARE EDUCATION/TRAINING PROGRAM

## 2024-04-28 PROCEDURE — 2500000003 HC RX 250 WO HCPCS: Performed by: STUDENT IN AN ORGANIZED HEALTH CARE EDUCATION/TRAINING PROGRAM

## 2024-04-28 RX ORDER — LORAZEPAM 2 MG/ML
1 INJECTION INTRAMUSCULAR EVERY 6 HOURS PRN
Status: DISCONTINUED | OUTPATIENT
Start: 2024-04-28 | End: 2024-04-30

## 2024-04-28 RX ORDER — SODIUM CHLORIDE 9 MG/ML
INJECTION, SOLUTION INTRAVENOUS PRN
Status: DISCONTINUED | OUTPATIENT
Start: 2024-04-28 | End: 2024-05-01 | Stop reason: HOSPADM

## 2024-04-28 RX ORDER — POLYETHYLENE GLYCOL 3350 17 G/17G
17 POWDER, FOR SOLUTION ORAL DAILY PRN
Status: DISCONTINUED | OUTPATIENT
Start: 2024-04-28 | End: 2024-05-01 | Stop reason: HOSPADM

## 2024-04-28 RX ORDER — MORPHINE SULFATE 4 MG/ML
4 INJECTION, SOLUTION INTRAMUSCULAR; INTRAVENOUS EVERY 4 HOURS PRN
Status: DISCONTINUED | OUTPATIENT
Start: 2024-04-28 | End: 2024-04-28

## 2024-04-28 RX ORDER — HYDROMORPHONE HYDROCHLORIDE 2 MG/1
0.5 TABLET ORAL
Status: DISCONTINUED | OUTPATIENT
Start: 2024-04-28 | End: 2024-04-28

## 2024-04-28 RX ORDER — ENOXAPARIN SODIUM 100 MG/ML
40 INJECTION SUBCUTANEOUS DAILY
Status: DISCONTINUED | OUTPATIENT
Start: 2024-04-28 | End: 2024-04-28

## 2024-04-28 RX ORDER — HYDROMORPHONE HYDROCHLORIDE 1 MG/ML
0.25 INJECTION, SOLUTION INTRAMUSCULAR; INTRAVENOUS; SUBCUTANEOUS
Status: DISCONTINUED | OUTPATIENT
Start: 2024-04-28 | End: 2024-04-28

## 2024-04-28 RX ORDER — HYDROMORPHONE HYDROCHLORIDE 1 MG/ML
0.25 INJECTION, SOLUTION INTRAMUSCULAR; INTRAVENOUS; SUBCUTANEOUS
Status: DISCONTINUED | OUTPATIENT
Start: 2024-04-28 | End: 2024-04-30

## 2024-04-28 RX ORDER — HALOPERIDOL 5 MG/ML
2 INJECTION INTRAMUSCULAR EVERY 6 HOURS PRN
Status: DISCONTINUED | OUTPATIENT
Start: 2024-04-28 | End: 2024-05-01 | Stop reason: HOSPADM

## 2024-04-28 RX ORDER — GLYCOPYRROLATE 0.2 MG/ML
0.2 INJECTION INTRAMUSCULAR; INTRAVENOUS EVERY 4 HOURS PRN
Status: DISCONTINUED | OUTPATIENT
Start: 2024-04-28 | End: 2024-05-01 | Stop reason: HOSPADM

## 2024-04-28 RX ORDER — HYDROMORPHONE HYDROCHLORIDE 1 MG/ML
0.5 INJECTION, SOLUTION INTRAMUSCULAR; INTRAVENOUS; SUBCUTANEOUS
Status: DISCONTINUED | OUTPATIENT
Start: 2024-04-28 | End: 2024-04-30

## 2024-04-28 RX ORDER — ACETAMINOPHEN 325 MG/1
650 TABLET ORAL EVERY 6 HOURS PRN
Status: DISCONTINUED | OUTPATIENT
Start: 2024-04-28 | End: 2024-05-01 | Stop reason: HOSPADM

## 2024-04-28 RX ORDER — SODIUM CHLORIDE 0.9 % (FLUSH) 0.9 %
5-40 SYRINGE (ML) INJECTION EVERY 12 HOURS SCHEDULED
Status: DISCONTINUED | OUTPATIENT
Start: 2024-04-28 | End: 2024-05-01 | Stop reason: HOSPADM

## 2024-04-28 RX ORDER — SODIUM CHLORIDE 0.9 % (FLUSH) 0.9 %
5-40 SYRINGE (ML) INJECTION PRN
Status: DISCONTINUED | OUTPATIENT
Start: 2024-04-28 | End: 2024-05-01 | Stop reason: HOSPADM

## 2024-04-28 RX ORDER — MORPHINE SULFATE 2 MG/ML
2 INJECTION, SOLUTION INTRAMUSCULAR; INTRAVENOUS EVERY 4 HOURS PRN
Status: DISCONTINUED | OUTPATIENT
Start: 2024-04-28 | End: 2024-04-28

## 2024-04-28 RX ORDER — ONDANSETRON 4 MG/1
4 TABLET, ORALLY DISINTEGRATING ORAL EVERY 8 HOURS PRN
Status: DISCONTINUED | OUTPATIENT
Start: 2024-04-28 | End: 2024-05-01 | Stop reason: HOSPADM

## 2024-04-28 RX ORDER — HALOPERIDOL 5 MG/ML
2 INJECTION INTRAMUSCULAR EVERY 6 HOURS PRN
Status: DISCONTINUED | OUTPATIENT
Start: 2024-04-28 | End: 2024-04-28

## 2024-04-28 RX ORDER — HYDROMORPHONE HYDROCHLORIDE 1 MG/ML
0.5 INJECTION, SOLUTION INTRAMUSCULAR; INTRAVENOUS; SUBCUTANEOUS
Status: DISCONTINUED | OUTPATIENT
Start: 2024-04-28 | End: 2024-04-28

## 2024-04-28 RX ORDER — LORAZEPAM 2 MG/ML
1 INJECTION INTRAMUSCULAR EVERY 4 HOURS PRN
Status: DISCONTINUED | OUTPATIENT
Start: 2024-04-28 | End: 2024-04-28

## 2024-04-28 RX ORDER — ONDANSETRON 2 MG/ML
4 INJECTION INTRAMUSCULAR; INTRAVENOUS EVERY 6 HOURS PRN
Status: DISCONTINUED | OUTPATIENT
Start: 2024-04-28 | End: 2024-04-28

## 2024-04-28 RX ORDER — KETOROLAC TROMETHAMINE 30 MG/ML
15 INJECTION, SOLUTION INTRAMUSCULAR; INTRAVENOUS EVERY 6 HOURS PRN
Status: DISCONTINUED | OUTPATIENT
Start: 2024-04-28 | End: 2024-05-01 | Stop reason: HOSPADM

## 2024-04-28 RX ORDER — ACETAMINOPHEN 650 MG/1
650 SUPPOSITORY RECTAL EVERY 6 HOURS PRN
Status: DISCONTINUED | OUTPATIENT
Start: 2024-04-28 | End: 2024-05-01 | Stop reason: HOSPADM

## 2024-04-28 RX ORDER — ONDANSETRON 2 MG/ML
4 INJECTION INTRAMUSCULAR; INTRAVENOUS EVERY 6 HOURS PRN
Status: DISCONTINUED | OUTPATIENT
Start: 2024-04-28 | End: 2024-05-01 | Stop reason: HOSPADM

## 2024-04-28 RX ADMIN — PIPERACILLIN AND TAZOBACTAM 3375 MG: 3; .375 INJECTION, POWDER, LYOPHILIZED, FOR SOLUTION INTRAVENOUS at 09:06

## 2024-04-28 RX ADMIN — HYDROMORPHONE HYDROCHLORIDE 0.5 MG: 1 INJECTION, SOLUTION INTRAMUSCULAR; INTRAVENOUS; SUBCUTANEOUS at 23:15

## 2024-04-28 RX ADMIN — KETOROLAC TROMETHAMINE 15 MG: 30 INJECTION, SOLUTION INTRAMUSCULAR at 20:27

## 2024-04-28 RX ADMIN — POTASSIUM CHLORIDE, DEXTROSE MONOHYDRATE AND SODIUM CHLORIDE: 150; 5; 450 INJECTION, SOLUTION INTRAVENOUS at 13:34

## 2024-04-28 RX ADMIN — ENOXAPARIN SODIUM 40 MG: 100 INJECTION SUBCUTANEOUS at 08:54

## 2024-04-28 RX ADMIN — MORPHINE SULFATE 2 MG: 2 INJECTION, SOLUTION INTRAMUSCULAR; INTRAVENOUS at 11:24

## 2024-04-28 RX ADMIN — SODIUM CHLORIDE, PRESERVATIVE FREE 10 ML: 5 INJECTION INTRAVENOUS at 20:31

## 2024-04-28 RX ADMIN — ACETAMINOPHEN 650 MG: 650 SUPPOSITORY RECTAL at 15:34

## 2024-04-28 RX ADMIN — PIPERACILLIN AND TAZOBACTAM 4500 MG: 4; .5 INJECTION, POWDER, LYOPHILIZED, FOR SOLUTION INTRAVENOUS at 03:29

## 2024-04-28 RX ADMIN — MORPHINE SULFATE 4 MG: 4 INJECTION, SOLUTION INTRAMUSCULAR; INTRAVENOUS at 05:21

## 2024-04-28 RX ADMIN — HYDROMORPHONE HYDROCHLORIDE 0.5 MG: 1 INJECTION, SOLUTION INTRAMUSCULAR; INTRAVENOUS; SUBCUTANEOUS at 17:57

## 2024-04-28 RX ADMIN — SODIUM CHLORIDE, PRESERVATIVE FREE 10 ML: 5 INJECTION INTRAVENOUS at 08:53

## 2024-04-28 ASSESSMENT — LIFESTYLE VARIABLES
HOW MANY STANDARD DRINKS CONTAINING ALCOHOL DO YOU HAVE ON A TYPICAL DAY: PATIENT DOES NOT DRINK
HOW OFTEN DO YOU HAVE A DRINK CONTAINING ALCOHOL: NEVER

## 2024-04-28 ASSESSMENT — PAIN SCALES - GENERAL: PAINLEVEL_OUTOF10: 4

## 2024-04-28 NOTE — ED NOTES
Updated DO Destin in regards to patient's bladder scan and potential need for straight catheterization. DO Destin reports no need for straight catheterization at this time, to repeat bladder scan in a few hours and if bladder is >250mL catheterization can be performed. ANAM Shay primary nurse updated.

## 2024-04-28 NOTE — H&P
Hospitalist Admission Note    NAME:  Jone Meneses   :  1940   MRN:  [id number]     Date/Time:  2024 1:06 AM    Patient PCP: Jeovany Barrientos MD    ______________________________________________________________________  Given the patient's current clinical presentation, I have a high level of concern for decompensation if discharged from the emergency department.  Complex decision making was performed, which includes reviewing the patient's available past medical records, laboratory results, and x-ray films.       My assessment of this patient's clinical condition and my plan of care is as follows.    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  Discussed that we would support his body with IV fluids and antibiotics and permit time to see how he progresses.  Clarified that we would not escalate care further and confirmed that they would not desire any invasive interventions.  Clearly stated that it is possible patient may not survive admission to which they verbalized understanding.  Attempt nystatin swish and swallow when/if mentation improves    Medical Decision Making:   I personally reviewed labs: Yes, as listed below  I personally reviewed imaging: CT abdomen pelvis, CT head  Toxic drug monitoring: Zosyn  Discussed case with: ED provider. After discussion I am in agreement

## 2024-04-28 NOTE — PROGRESS NOTES
Spiritual Care Assessment/Progress Note  [facility]    Name: Jone Meneses MRN: [id number]    Age: 83 y.o.     Sex: male   Language: English     Date: 4/28/2024            Total Time Calculated: 10 min              Spiritual Assessment begun in [facility] MEDICAL ONCOLOGY  Service Provided For: Patient not available  Referral/Consult From: Yoly  Encounter Overview/Reason: Attempted Encounter    Spiritual beliefs:      [] Involved in a mary tradition/spiritual practice:      [] Supported by a mary community:      [] Claims no spiritual orientation:      [] Seeking spiritual identity:           [] Adheres to an individual form of spirituality:      [x] Not able to assess:                Identified resources for coping and support system:   Support System: Unknown       [] Prayer                  [] Devotional reading               [] Music                  [] Guided Imagery     [] Pet visits                                        [] Other: (COMMENT)     Specific area/focus of visit   Encounter:    Crisis:    Spiritual/Emotional needs: Type: Spiritual Support  Ritual, Rites and Sacraments:    Grief, Loss, and Adjustments:    Ethics/Mediation:    Behavioral Health:    Palliative Care:    Advance Care Planning:      Plan/Referrals: Continue to visit, (comment), Continue Support (comment)    Narrative:  reviewed the patient's chart prior to the visit. Mr. Meneses was asleep when the  tried to visit with him.  prayed silently.    Spiritual Health Services are available 24 hours a day as requested.     Rev. ELIZABETH Bailey  [facility]   Paging Service [phone])

## 2024-04-28 NOTE — PROGRESS NOTES
[facility]  Good Help to Those in Need  [phone]    Nursing Note   Patient Name: Jone Meneses  YOB: 1940  Age: 83 y.o.    [facility] Hospice RN Note:      Referral received and hospice consult noted. Chart reviewed. Will see and assess patient then contact family if they are not present.      Per Medicare, patient is presently enrolled with [facility] which is now [facility].  If patient meets [facility] hospice criteria and family wishes to admit, they will need to contact [facility] to complete revocation.    If there are any questions, please contact this writer or the main [facility] number (see below).    DEBRA Stanton, RN, [facility]  Hospice Liaison  [phone] (mobile)  [phone] (main [facility] number)  Available on World Review[employer]    3249:  Met with Linda/wife and Vickie/daughter outside patient's room.  They are aware that their loved one is not getting better and wants him to be comfortable.  Patient does meet [facility] hospice criteria but [facility] is unable to admit due to patient being enrolled with [facility].  Informed wife that she would need to revoke from [facility] before [facility] can admit.  Wife plans to contact them today to get this completed.  Shared that [facility] will need to wait 24 hours before admitting due to Medicare regulations.  Reviewed CMO which wife wishes to start.  Provided BS contact information and that our team will be available daily.    Informed Dr Hall via [employer] with this information.

## 2024-04-28 NOTE — PROGRESS NOTES
Chart reviewed, patient seen, discussed with family at length  Also discussed with RN and [facility] hospice RN    Patient has been under home hospice 3 weeks ago, was stable until he developed this colitis for which she got admitted to the hospital.    Family agreed on stopping IV fluids and antibiotics.  Family requested to deactivate AICD, will put consult for cardiology for that  Will put comfort measures orders    Jovana Hall MD

## 2024-04-28 NOTE — PROGRESS NOTES
.End of Shift Note    Bedside shift change report given to Teresa MENDIETA  (oncoming nurse) by Ena Hassan RN (offgoing nurse).  Report included the following information SBAR, Kardex, Intake/Output, MAR, Recent Results, and Med Rec Status    Shift worked:  2929-2363     Shift summary and any significant changes:     Pt family at bedside met with Hospital ist and Hospice RN Shayna to discuss pt transition to  Hospice. Family will need to d/c Hospice with [facility] first. Plan is to move pt to Hospice Home hopefully by Tuesday. Pt had x1 BM today. Manwicke in placed. Pt given pain med prn per MAR. Pt repositioned and mouth care complete.     Pt has a AICD that needs to be de-activated. Cardiologist advised to call rep on Monday to de-acitvate. Family had resevations to have magnet placed on chest today in fear of pacemaker being effected. Educated family on what changes would be made to pacemaker and AICD. Cardiologist advised to wait until Monday to place magnet but to have near by patient if needed right away. Hospitalist approved.            Ena Hassan RN                        \

## 2024-04-28 NOTE — PLAN OF CARE
Problem: Safety - Adult  Goal: Free from fall injury  Flowsheets (Taken 4/28/2024 0608)  Free From Fall Injury: Instruct family/caregiver on patient safety     Problem: ABCDS Injury Assessment  Goal: Absence of physical injury  Flowsheets (Taken 4/28/2024 0608)  Absence of Physical Injury: Implement safety measures based on patient assessment

## 2024-04-28 NOTE — PROGRESS NOTES
[facility]  Good Help to Those in Need  [phone]    Nursing Note   Patient Name: Jone Meneses  YOB: 1940  Age: 83 y.o.    [facility] RN Note:      Received call from Ena MENDIETA on the Oncology Unit who informed that there is a hospice consult placed for this patient to evaluate for [facility] hospice level of care.  Informed that this writer will contact CM for hospice referral.    Called and spoke with Alysha RODRIGUEZ and informed of this hospice consult.  Alysha will send referral.    Shayna Marshall, ANNEN, RN, [facility]  Hospice Liaison  [phone] (mobile)  [phone] (main [facility] number)  Available on [employer]

## 2024-04-28 NOTE — PROCEDURES
Called consult for Cardiology and spoke to Yuliana. He explained we will need to place magnet over AICD. And call Representative to finish process of de-activating AICD. Family had concerns and reservations of us placing the magnet on today and worried about the effect it will have on his pacemaker. Even though the cardiologist explained that it will just keep the pacemaker pacing but we would need to de activate AICD because pt is a Hospice pt. After having conversation with Cardiologist he advised to just start process tomorrow so we will have rep information as well and to keep magnet close by to pt in case it is needed.     Pt has a ABBOTT CRT-D Erich Swifto AICD per wife.

## 2024-04-28 NOTE — CARE COORDINATION
Received call from hospice RN Shayna notifying of care team's request for hospice assessment. CM sent hospice order/ referral to [facility].    Alysha Berry, [facility]  Care Management  [phone]

## 2024-04-29 LAB
EKG ATRIAL RATE: 110 BPM
EKG DIAGNOSIS: NORMAL
EKG Q-T INTERVAL: 370 MS
EKG QRS DURATION: 134 MS
EKG QTC CALCULATION (BAZETT): 493 MS
EKG R AXIS: -81 DEGREES
EKG T AXIS: 99 DEGREES
EKG VENTRICULAR RATE: 107 BPM

## 2024-04-29 PROCEDURE — 6360000002 HC RX W HCPCS: Performed by: GENERAL ACUTE CARE HOSPITAL

## 2024-04-29 PROCEDURE — 2580000003 HC RX 258: Performed by: STUDENT IN AN ORGANIZED HEALTH CARE EDUCATION/TRAINING PROGRAM

## 2024-04-29 PROCEDURE — 1100000000 HC RM PRIVATE

## 2024-04-29 RX ADMIN — SODIUM CHLORIDE, PRESERVATIVE FREE 10 ML: 5 INJECTION INTRAVENOUS at 21:10

## 2024-04-29 RX ADMIN — HYDROMORPHONE HYDROCHLORIDE 0.5 MG: 1 INJECTION, SOLUTION INTRAMUSCULAR; INTRAVENOUS; SUBCUTANEOUS at 14:39

## 2024-04-29 RX ADMIN — SODIUM CHLORIDE, PRESERVATIVE FREE 10 ML: 5 INJECTION INTRAVENOUS at 08:29

## 2024-04-29 ASSESSMENT — PAIN SCALES - GENERAL
PAINLEVEL_OUTOF10: 5
PAINLEVEL_OUTOF10: 3

## 2024-04-29 NOTE — PROGRESS NOTES
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       Objective:      VITALS:   Last 24hrs VS reviewed since prior progress note. Most recent are:  Patient Vitals for the past 24 hrs:   BP Temp Temp src Pulse Resp SpO2   24 0754 95/65 99 °F (37.2 °C) -- 90 17 97 %   24 0518 -- 98.6 °F (37 °C) -- 90 -- 93 %   24 0059 -- 99 °F (37.2 °C) -- 88 -- 95 %   24 2345 -- -- -- -- 20 --   24 2315 -- 98.9 °F (37.2 °C) -- -- -- --   24 (!) 107/57 100.1 °F (37.8 °C) Oral (!) 110 23 --   24 (!) 107/57 (!) 100.8 °F (38.2 °C) -- (!) 110 23 98 %       No intake or output data in the 24 hours ending 24 1846     I had a face to face encounter and independently examined this patient, as outlined below:  PHYSICAL EXAM:  General: WD, WN. No acute distress    EENT:  EOMI. Anicteric sclerae. MMM  Resp:  CTA bilaterally. No wheezing. No rales.  No accessory muscle use  CV:  Regular  rhythm,  No edema  GI/:  Soft. distended. No tenderness.  +Bowel sounds  Neurologic:  Lethargic   Skin/MSK: No rashes.  No jaundice    Orthostatic Vitals:       No data to display

## 2024-04-29 NOTE — PROGRESS NOTES
.End of Shift Note    Bedside shift change report given to Mandy TANNER  (oncoming nurse) by Ena Hassan, RN (offgoing nurse).  Report included the following information SBAR, Kardex, Intake/Output, MAR, Recent Results, and Med Rec Status    Shift worked:  4991-9612     Shift summary and any significant changes:     Pt AICD de-activated this morning. Pt had x1 BM and pt changed and bedding changed. Pt has minimal urine output. Shayna hospice RN spoke with family this afternoon regarding Hospice process. Pt given pain med x1 on shift.          Ena Hassan, RN

## 2024-04-29 NOTE — PROGRESS NOTES
[facility]  Good Help to Those in Need  [phone]    Nursing Note   Patient Name: Jone Meneses  YOB: 1940  Age: 83 y.o.    [facility] RN Note:      Follow-up visit with Linda/wife to see if revocation paperwork has been completed.  Linda stated that she called [facility] yesterday but revocation was not mentioned.  Call placed to [facility] and placed on speaker phone with wife present.  Wife requested to meet with a representative to complete revocation paperwork.  Representative on phone stated that a member of their team will come to [facility] tomorrow to complete this paperwork with wife.  Once this paperwork is completed, [facility] will have to wait 24 hrs before we can admit.  If there are any questions, please contact this writer or the main [facility] number (see below).    ANNE StantonN, RN, [facility]  Hospice Liaison  [phone] (mobile)  [phone] (main [facility] number)  Available on S[employer]

## 2024-04-29 NOTE — PROGRESS NOTES
Bedside shift change report given to ANAM Sutherland (oncoming nurse) by ANAM Baker (offgoing nurse).    Walk in

## 2024-04-30 LAB — CREAT BLD-MCNC: 4.98 MG/DL (ref 0.6–1.3)

## 2024-04-30 PROCEDURE — 6360000002 HC RX W HCPCS: Performed by: GENERAL ACUTE CARE HOSPITAL

## 2024-04-30 PROCEDURE — 6360000002 HC RX W HCPCS

## 2024-04-30 PROCEDURE — 1100000000 HC RM PRIVATE

## 2024-04-30 PROCEDURE — 2580000003 HC RX 258: Performed by: STUDENT IN AN ORGANIZED HEALTH CARE EDUCATION/TRAINING PROGRAM

## 2024-04-30 RX ORDER — LORAZEPAM 2 MG/ML
1 INJECTION INTRAMUSCULAR EVERY 4 HOURS
Status: DISCONTINUED | OUTPATIENT
Start: 2024-04-30 | End: 2024-05-01 | Stop reason: HOSPADM

## 2024-04-30 RX ORDER — LORAZEPAM 2 MG/ML
1 INJECTION INTRAMUSCULAR
Status: DISCONTINUED | OUTPATIENT
Start: 2024-04-30 | End: 2024-05-01 | Stop reason: HOSPADM

## 2024-04-30 RX ORDER — HYDROMORPHONE HYDROCHLORIDE 1 MG/ML
1 INJECTION, SOLUTION INTRAMUSCULAR; INTRAVENOUS; SUBCUTANEOUS EVERY 4 HOURS
Status: DISCONTINUED | OUTPATIENT
Start: 2024-04-30 | End: 2024-05-01 | Stop reason: HOSPADM

## 2024-04-30 RX ORDER — HYDROMORPHONE HYDROCHLORIDE 1 MG/ML
1 INJECTION, SOLUTION INTRAMUSCULAR; INTRAVENOUS; SUBCUTANEOUS
Status: DISCONTINUED | OUTPATIENT
Start: 2024-04-30 | End: 2024-05-01 | Stop reason: HOSPADM

## 2024-04-30 RX ADMIN — HYDROMORPHONE HYDROCHLORIDE 1 MG: 1 INJECTION, SOLUTION INTRAMUSCULAR; INTRAVENOUS; SUBCUTANEOUS at 19:54

## 2024-04-30 RX ADMIN — LORAZEPAM 1 MG: 2 INJECTION INTRAMUSCULAR; INTRAVENOUS at 23:59

## 2024-04-30 RX ADMIN — LORAZEPAM 1 MG: 2 INJECTION INTRAMUSCULAR; INTRAVENOUS at 15:41

## 2024-04-30 RX ADMIN — HYDROMORPHONE HYDROCHLORIDE 0.5 MG: 1 INJECTION, SOLUTION INTRAMUSCULAR; INTRAVENOUS; SUBCUTANEOUS at 13:36

## 2024-04-30 RX ADMIN — HYDROMORPHONE HYDROCHLORIDE 0.25 MG: 1 INJECTION, SOLUTION INTRAMUSCULAR; INTRAVENOUS; SUBCUTANEOUS at 00:32

## 2024-04-30 RX ADMIN — HYDROMORPHONE HYDROCHLORIDE 0.5 MG: 1 INJECTION, SOLUTION INTRAMUSCULAR; INTRAVENOUS; SUBCUTANEOUS at 09:50

## 2024-04-30 RX ADMIN — HYDROMORPHONE HYDROCHLORIDE 0.25 MG: 1 INJECTION, SOLUTION INTRAMUSCULAR; INTRAVENOUS; SUBCUTANEOUS at 06:43

## 2024-04-30 RX ADMIN — LORAZEPAM 1 MG: 2 INJECTION INTRAMUSCULAR; INTRAVENOUS at 19:54

## 2024-04-30 RX ADMIN — HYDROMORPHONE HYDROCHLORIDE 1 MG: 1 INJECTION, SOLUTION INTRAMUSCULAR; INTRAVENOUS; SUBCUTANEOUS at 23:58

## 2024-04-30 RX ADMIN — KETOROLAC TROMETHAMINE 15 MG: 30 INJECTION, SOLUTION INTRAMUSCULAR at 00:33

## 2024-04-30 RX ADMIN — SODIUM CHLORIDE, PRESERVATIVE FREE 10 ML: 5 INJECTION INTRAVENOUS at 19:55

## 2024-04-30 RX ADMIN — LORAZEPAM 1 MG: 2 INJECTION INTRAMUSCULAR; INTRAVENOUS at 13:36

## 2024-04-30 RX ADMIN — KETOROLAC TROMETHAMINE 15 MG: 30 INJECTION, SOLUTION INTRAMUSCULAR at 06:20

## 2024-04-30 RX ADMIN — HYDROMORPHONE HYDROCHLORIDE 1 MG: 1 INJECTION, SOLUTION INTRAMUSCULAR; INTRAVENOUS; SUBCUTANEOUS at 15:40

## 2024-04-30 RX ADMIN — SODIUM CHLORIDE, PRESERVATIVE FREE 10 ML: 5 INJECTION INTRAVENOUS at 09:24

## 2024-04-30 ASSESSMENT — PAIN SCALES - GENERAL
PAINLEVEL_OUTOF10: 3
PAINLEVEL_OUTOF10: 3
PAINLEVEL_OUTOF10: 7

## 2024-04-30 NOTE — PROGRESS NOTES
End of Shift Note    Bedside shift change report given to Wilbur MENDIETA (oncoming nurse) by Ramsey Granda RN (offgoing nurse).  Report included the following information SBAR, Kardex, Intake/Output, and MAR    Shift worked: 9862-9531     Shift summary and any significant changes:     Patient received his scheduled medication per the MAR. Pt continues to receive comfort end of life care. Current PRN pain regimen measures put in placed to give Pt the maximum comfort and it has been effective. Pt repositioned for comfort during the shift.     Concerns for physician to address:       Zone phone for oncoming shift:          Activity:  Level of Assistance: Dependent, patient does less than 25%    Cardiac:   Cardiac Monitoring:  no    Access:  Current line(s): PIV     Genitourinary:        Respiratory:   O2 Device: None (Room air)    GI:  Current diet: Diet NPO Exceptions are: Ice Chips, Sips of Water with Meds    Pain Management:   Patient states pain is manageable on current regimen: YES    Skin:  Cooper Scale Score: 9  Interventions: Wound Offloading (Prevention Methods): Elevate heels, Turning, Repositioning  Pressure injury: no    Patient Safety:  Fall Score: Randhawa Total Score: 55  Fall Risk Interventions  Nursing Judgement-Fall Risk High(Add Comments): No  Toilet Every 2 Hours-In Advance of Need: No (Comment)  Hourly Visual Checks: In bed  Fall Visual Posted: Socks  Room Door Open: Deferred to promote rest  Alarm On: Bed  Patient Moved Closer to Nursing Station: No    Active Consults:  IP CONSULT TO HOSPICE  IP CONSULT TO CARDIOLOGY    Length of Stay:  Expected LOS: 7  Actual LOS: 2      Ramsey Granda RN

## 2024-04-30 NOTE — PROGRESS NOTES
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       Objective:      VITALS:   Last 24hrs VS reviewed since prior progress note. Most recent are:  Patient Vitals for the past 24 hrs:   BP Temp Pulse Resp SpO2   24 1630 -- 98.2 °F (36.8 °C) -- -- --   24 0803 106/63 99 °F (37.2 °C) (!) 101 19 96 %   24 0615 -- 100.4 °F (38 °C) -- -- --   24 0030 -- 99.3 °F (37.4 °C) -- -- --   24 0015 -- 100.2 °F (37.9 °C) -- -- --         No intake or output data in the 24 hours ending 24 1836     I had a face to face encounter and independently examined this patient, as outlined below:  PHYSICAL EXAM:  General: WD, WN. No acute distress    EENT:  EOMI. Anicteric sclerae. MMM  Resp:  CTA bilaterally. No wheezing. No rales.  No accessory muscle use  CV:  Regular  rhythm,  No edema  GI/:  Soft. distended. No tenderness.  hypoactive Bowel sounds  Neurologic:  Lethargic   Skin/MSK: No rashes.  No jaundice    Orthostatic Vitals:       No data to display                    Current Inpatient Medications:      Current Facility-Administered Medications:     LORazepam (ATIVAN) injection 1 mg, 1

## 2024-04-30 NOTE — PROGRESS NOTES
End of Shift Note    Bedside shift change report given to Lachelle MENDIETA (oncoming nurse) by Mandy Amaya LPN (offgoing nurse).  Report included the following information SBAR, Kardex, and MAR    Shift worked:  7p-7:30a     Shift summary and any significant changes:     Pt tolerated care fairly well. Pt treated with PRN  pain medication per MAR and treated for fever (see MAR). Pt family by bedside. Pt care provided, pt had 1BM, Manwicke in place . Pt repositioned during. Care rounding provided.          Concerns for physician to address:       Zone phone for oncoming shift:              Mandy Amaya LPN

## 2024-04-30 NOTE — PROGRESS NOTES
Hospice Nurse Practitioner Note:     Reviewed history and saw patient in conjunction with hospice liaison, Kandi. Met with patient spouse, Linda, at bedside. Of note, patient previously enrolled with [facility] at home prior to [facility] admission. Pt's spouse met with [facility] rep this morning and signed revocation documentation. Due to 24-hour wait period for hospice re-enrollment, will continue to closely monitor and plan for most likely [facility] admission tomorrow. We had a lengthy discussion at bedside with spouse in regard to comfort medications for symptom management; patient requires IV medications for adequate control of symptom burden of labored breathing with use of accessory muscles, restlessness, nonverbal signs of pain. Unable to tolerate oral/sublingual medications at this time due unresponsiveness. Patient's wife in agreement with [facility] hospice plan of care with scheduled IV medications for symptom burden, in addition to as needed symptom management medications. Discussed plan of care with bedside nursing team and hospice medical director; closely monitor overnight events and hospice team to evaluate in the morning.     Geraldo Dillon, APRN - CNP

## 2024-04-30 NOTE — PROGRESS NOTES
Physician Progress Note      PATIENT:               HILDA LOFTON  [facility] #:                  [id number]  :                       1940  ADMIT DATE:       2024 6:31 PM  DISCH DATE:  RESPONDING  PROVIDER #:        Jovana Hall MD        QUERY TEXT:    Type of Encephalopathy: Please provide further specificity, if known.    Clinical indicators include:  Options provided:  -- Anoxic/hypoxic encephalopathy  -- Metabolic encephalopathy  -- Toxic encephalopathy  -- Hepatic encephalopathy  -- Hypertensive encephalopathy  -- Other - I will add my own diagnosis  -- Disagree - Not applicable / Not valid  -- Disagree - Clinically Unable to determine / Unknown        PROVIDER RESPONSE TEXT:    The patient has metabolic encephalopathy.      Electronically signed by:  Jovana Hall MD 2024 12:01 AM

## 2024-04-30 NOTE — PROGRESS NOTES
Hospice Liaison Note: this RN and hospice NP, Geraldo Dillon met with pt's spouse and their  at bedside. Spouse, Linda met with [facility] rep this AM and signed revocation paperwork. Unfortunately, after pt's have revoked hospice, there is a 24hr waiting period for re-enrollment. We spoke with Linda at length at bedside and made some adjustments to comfort medications with her approval. Updated bedside RN on plan of care. We will check in tomorrow.

## 2024-05-01 VITALS
WEIGHT: 174.16 LBS | OXYGEN SATURATION: 94 % | SYSTOLIC BLOOD PRESSURE: 87 MMHG | TEMPERATURE: 99 F | DIASTOLIC BLOOD PRESSURE: 65 MMHG | RESPIRATION RATE: 18 BRPM | BODY MASS INDEX: 22.35 KG/M2 | HEIGHT: 74 IN | HEART RATE: 120 BPM

## 2024-05-01 PROBLEM — A41.9 SEPSIS (HCC): Status: ACTIVE | Noted: 2024-01-01

## 2024-05-01 PROCEDURE — 6360000002 HC RX W HCPCS

## 2024-05-01 PROCEDURE — 2580000003 HC RX 258: Performed by: STUDENT IN AN ORGANIZED HEALTH CARE EDUCATION/TRAINING PROGRAM

## 2024-05-01 RX ADMIN — HYDROMORPHONE HYDROCHLORIDE 1 MG: 1 INJECTION, SOLUTION INTRAMUSCULAR; INTRAVENOUS; SUBCUTANEOUS at 07:45

## 2024-05-01 RX ADMIN — HYDROMORPHONE HYDROCHLORIDE 1 MG: 1 INJECTION, SOLUTION INTRAMUSCULAR; INTRAVENOUS; SUBCUTANEOUS at 12:04

## 2024-05-01 RX ADMIN — LORAZEPAM 1 MG: 2 INJECTION INTRAMUSCULAR; INTRAVENOUS at 07:44

## 2024-05-01 RX ADMIN — HYDROMORPHONE HYDROCHLORIDE 1 MG: 1 INJECTION, SOLUTION INTRAMUSCULAR; INTRAVENOUS; SUBCUTANEOUS at 03:52

## 2024-05-01 RX ADMIN — LORAZEPAM 1 MG: 2 INJECTION INTRAMUSCULAR; INTRAVENOUS at 03:52

## 2024-05-01 RX ADMIN — LORAZEPAM 1 MG: 2 INJECTION INTRAMUSCULAR; INTRAVENOUS at 12:04

## 2024-05-01 RX ADMIN — SODIUM CHLORIDE, PRESERVATIVE FREE 10 ML: 5 INJECTION INTRAVENOUS at 07:45

## 2024-05-01 ASSESSMENT — PAIN SCALES - GENERAL: PAINLEVEL_OUTOF10: 4

## 2024-05-01 NOTE — CARE COORDINATION
CM aware that pt will remain as inpatient w/ [facility] Hospice.  JENNIFER informed that once outpatient hospice d/c, [facility] will be able to admit for [facility] hospice.    CORI Chowdary   [phone]

## 2024-05-01 NOTE — PROGRESS NOTES
Hospice Liaison Note: met with daughter at bedside this AM, wife not present. Pt remains unresponsive on scheduled IV comfort medications. Awaiting spouse's return to sign hospice paperwork to officially admit to inpatient hospice for EOL symptom management of nonverbal pain and dyspnea. Verbal CTI from hospice medical director, Dr. Vargas for the hospice diagnosis of Sepsis.

## 2024-05-01 NOTE — H&P
[facility]   Good Help to Those in Need  [phone]     Patient Name:  Jone Meneses  YOB: 1940       Date of Provider Hospice Visit: 05/01/24     Level of Care:   [x] General Inpatient (GIP)              [] Routine                   [] Respite     Current Location of Care:  [] [facility]           [] [facility]         [x] [facility]        [] [facility]           [] Hospice House ([facility])     IF [facility], patient referred from:  [] [facility]           [] [facility]         [] [facility]        [] [facility]           [] Home         [] Other:      Date of Original Hospice Admission:  5/1/2024  2:16 PM   Hospice Medical Director at time of admission: Dr. Vargas      Principle Hospice Diagnosis:   Sepsis (HCC) [A41.9]   Diagnoses RELATED to the terminal prognosis: Acute colitis, lactic acidosis   Other Diagnoses: Dementia, CKD III     HOSPICE SUMMARY   Jone Meneses is an 83 year old male with a past medical history as below, admitted to [facility] after presenting to ED on 4/27/24 with acute altered mentation, worsening abdominal distention, and persistent diarrhea for 48 hours. Of note, patient at this time was enrolled with [facility] with a primary diagnosis of dementia. With acute onset of altered mental status and increasing somnolence, patient's family opted to bring patient to ED for further management. Upon arrival, patient found to be hypotensive with systolics 80s-90s, elevated lactate 3.4, WBC 26, BUN 47, Cr 4.81 (baseline 2.0). Patient remained unresponsive, CT head negative. CT A/P showed diffuse colonic wall edema, notably in sigmoid colon concerning for acute colitis, in addition to gallstones and distended gallbladder. Patient's spouse was clear that she wanted to continue with not escalating care or aggressive measures, but wanted to pursue IV fluids and IV antibiotics if possible. Pt given IV Flagyl and Ceftriaxone. Patient did not show any significant signs of improvement and patient's spouse opted to continue

## 2024-05-01 NOTE — PLAN OF CARE
Problem: Hospice Orientation  Goal: Demonstrate understanding of hospice philosophy, plan of care, and inpatient hospice program  Description: The patient/family/caregiver will demonstrate understanding of hospice philosophy, plan of care and the inpatient hospice program as evidenced by participation in meeting the patient's psychosocial, spiritual, medical, and physical needs inclusive of medical supplies/equipment focusing on symptoms.  Outcome: Progressing     Problem: Potential for Alteration in Skin Integrity  Goal: Monitor skin for areas of alteration in skin integrity  Description: Patient [unfilled] will remain free from alterations of or worsening skin integrity as evidenced by no changes to skin during assessment each shift during the inpatient hospice stay.  Outcome: Progressing     Problem: Risk for Falls  Goal: Fall prevention  Description: Patient  will remain free from falls as evidenced by no witnessed or reported falls each shift during the inpatient hospice stay.     Patient  and or family/caregiver will receive education on fall prevention as evidenced by verbalizing recall of using the call lights system, fall prevention devices, and asking for help during the admission process and ongoing as needed during the inpatient hospice stay.    Outcome: Progressing     Problem: Alteration in Mobility  Goal: Remain as independent as possible and remain safe in environment  Description: Patient  will perform tasks or ADLs within their capabilities as often as they are able, as evidenced by remaining free of injury during the inpatient hospice stay.     Outcome: Progressing     Problem: Pain  Goal: Control of acute pain  Description: Patient  will exhibit a decrease in pain as evidenced by a pain rating less than 2 on the Adult Nonverbal Pain scale within 1 hour of receiving pain medication during the inpatient hospice stay.    Outcome: Progressing     Problem: Anxiety/Agitation/Restlessness  Goal: Verbalize

## 2024-05-01 NOTE — PLAN OF CARE
Problem: Safety - Adult  Goal: Free from fall injury  Outcome: Progressing     Problem: Skin/Tissue Integrity  Goal: Absence of new skin breakdown  Description: 1.  Monitor for areas of redness and/or skin breakdown  2.  Assess vascular access sites hourly  3.  Every 4-6 hours minimum:  Change oxygen saturation probe site  4.  Every 4-6 hours:  If on nasal continuous positive airway pressure, respiratory therapy assess nares and determine need for appliance change or resting period.  Outcome: Progressing     Problem: ABCDS Injury Assessment  Goal: Absence of physical injury  Outcome: Progressing     Problem: Pain  Goal: Verbalizes/displays adequate comfort level or baseline comfort level  Outcome: Progressing

## 2024-05-01 NOTE — DISCHARGE SUMMARY
Discharge Summary    Name: Jone Meneses  [id number]  YOB: 1940 (Age: 83 y.o.)   Date of Admission: 4/27/2024  Date of Discharge: 5/1/2024  Attending Physician: No att. 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  male with PMHx as listed below presenting to the emergency department via EMS for altered mentation, hypotension 70s/50s, progressively worsening belly distention, and diarrhea over the past 2 days.  Note patient is currently enrolled in hospice for dementia.  Family initially reached out to hospice, but were unsatisfied with recommendations for morphine administration to ameliorate symptoms stating that \"this was not the natural progression expected.\"  Elected to bring patient to emergency department for evaluation.  They do desire to remain in hospice, but would want to attempt therapies with IV fluids and antibiotics without further escalation of care.  They report at baseline, patient is able to communicate with frequent confusion.  Can ambulate with assistance of walker.  Can feed himself.  Can dress himself but note that this would take \"hours.\"     In the ED, patient afebrile, hypotensive systolics 80s-90s, saturating upper 90s.  ECG demonstrates wide rhythm-paced.  CT head negative for acute process.  CT abdomen pelvis demonstrates diffuse colonic wall edema most notably in sigmoid colon concerning for acute colitis as well as gallstones and distended gallbladder without common bile duct dilatation.  Labs demonstrate: Point-of-care lactic 3.84 => 3.67, WBC 26.0, hemoglobin 13.6, platelets 125, sodium 138,

## 2024-05-09 NOTE — CARE COORDINATION
CM aware that pt is inpatient w/ [facility]. CM available if CM needs arise.    Nat Prajapati LMSW,   [phone]

## 2024-05-13 NOTE — DISCHARGE SUMMARY
Hospice Discharge Summary    [facility]  Good Help to Those in Need        Date of Admission: 5/1/2024  Date of Discharge: 5/12/2024    Jone Meneses is a 84 y.o. year old who was admitted to [facility] at [facility] with a Hospice diagnosis of Sepsis (HCC) [A41.9].      The patient's care was focused on comfort and the patient passed away on 5/12/2024.

## 2024-05-16 NOTE — PROGRESS NOTES
.End of Shift Note    Bedside shift change report given to ANAM Zhu (oncoming nurse) by Sumi Parker RN (offgoing nurse).  Report included the following information SBAR, Kardex, and MAR    Shift worked: 7a-7p     Shift summary and any significant changes:    Scheduled medications given per MAR. No PRNs required. Family at bedside and provided updates. Comfort cart ordered.          Sumi Parker RN                        ]  
.End of Shift Note    Bedside shift change report given to Carley MENDIETA  (oncoming nurse) by Ena Hassan RN (offgoing nurse).  Report included the following information SBAR, Kardex, Intake/Output, MAR, Recent Results, and Med Rec Status    Shift worked:  6266-0362     Shift summary and any significant changes:     Pt given prescribed med's per MAR. No PRN given. Family at bedside.           Ena Hassan, RN                            
.End of Shift Note    Bedside shift change report given to Deepa TANNER (oncoming nurse) by Ena Hassan RN (offgoing nurse).  Report included the following information SBAR, Kardex, Procedure Summary, Intake/Output, MAR, Recent Results, and Med Rec Status    Shift worked:  4500-6678     Shift summary and any significant changes:     Pt given prescribed med's per MAR. Mouth care done q2 hrs. Family at bedside. Caring rounds completed.         Ena Hassan, RN                            
.End of Shift Note    Bedside shift change report given to.   (oncoming nurse) by Sumi Parker RN (offgoing nurse).  Report included the following information SBAR, Kardex, and MAR    Shift worked: 7a-7p     Shift summary and any significant changes:    Scheduled medications given per MAR. No PRNs required. Family at bedside and provided updates. Bath/Youngblood care completed. PRN Tylenol and Dulcolax given x1.     Bruising on inner right thigh much larger today, provider informed.            Sumi Parker, RN                           
During hourly rounding, patient was noted with no respiration at 0500. Pt pupil fixed and dilated, on auscultation, no apical pulse heard for 1 full minute, on respiration noted, no movement to painful stimuli. Pt was pronounced dead at 0500 by Carley MENDIETA, and ANAM Hoffman. Pt family is at bed side. Nursing supervisor made aware,  was called and he met with the family. [facility] released the body to be taken to  home due to pt not meeting the requirements. Did not get a call back from eye donation. Hospice team make aware.   
End of Shift Note    Bedside shift change report given to ANAM Hoffman (oncoming nurse) by Madeline Briones RN (offgoing nurse).  Report included the following information SBAR, Kardex, Intake/Output, MAR, and Recent Results    Shift worked:  6390-4826     Shift summary and any significant changes:    Pt received scheduled medications per MAR. Pt continuing to receive end of life care. Pt turned Q2 and repositioned with pillows and oral care completed. Pt wasserman clean, dry, and intact with minimal output. Family at bedside. Caring rounds completed.      Concerns for physician to address:  None     Zone phone for oncoming shift:   [phone]       Madeline Briones RN                           
End of Shift Note    Bedside shift change report given to ANAM Larose (oncoming nurse) by RADHIKA SORENSEN LPN (offgoing nurse).  Report included the following information SBAR, Kardex, and MAR    Shift worked:  5992-1360     Shift summary and any significant changes:    Patient received all prescribed medication per MAR. Mouth and wasserman care completed. Family at bedside.            RADHIKA SORENSEN LPN                            
End of Shift Note    Bedside shift change report given to ANAM Mosley (oncoming nurse) by Madeline Briones RN (offgoing nurse).  Report included the following information SBAR, Kardex, Intake/Output, MAR, and Recent Results    Shift worked:  6328-7140     Shift summary and any significant changes:    Pt received scheduled medications per MAR. Pt resting in bed during shift and comfort measures in place. Pt was turned and repositioned with pillows and oral care done. Pt wasserman clean, dry, and intact with minimal output. Family at bedside during shift.            Madeline Briones RN                            
End of Shift Note    Bedside shift change report given to ANAM Sutherland (oncoming nurse) by RADHIKA SORENSEN LPN (offgoing nurse).  Report included the following information SBAR, Kardex, and MAR    Shift worked:  4571-6475     Shift summary and any significant changes:    Patient received all prescribed medications per MAR. Family at bedside during shift. Patient kept comfortable during the shift.            RADHIKA SORENSEN LPN                            
End of Shift Note    Bedside shift change report given to DIEGO MENDIETA (oncoming nurse) by Ramsey Granda RN (offgoing nurse).  Report included the following information SBAR, Kardex, Intake/Output, and MAR    Shift worked: 0907-1793     Shift summary and any significant changes:     Patient received his scheduled medication per the MAR. Pt continues to receive comfort end of life care. Current pain regimen measures put in placed to give Pt the maximum comfort and it has been effective. MD gave an order for catheter to be inserted for comfort care. Pt repositioned for comfort during the shift.     Concerns for physician to address:       Zone phone for oncoming shift:          Activity:       Cardiac:   Cardiac Monitoring:  no    Access:  Current line(s): PIV     Genitourinary:        Respiratory:        GI:  Current diet: Diet NPO    Pain Management:   Patient states pain is manageable on current regimen: YES    Skin:     Interventions:    Pressure injury: no    Patient Safety:  Fall Score:         Active Consults:  None    Length of Stay:  Expected LOS: 2  Actual LOS: 0      Ramsey Granda RN   
End of Shift Note    Bedside shift change report given to Dalton MENDIETA (oncoming nurse) by Ramsey Granda RN (offgoing nurse).  Report included the following information SBAR, Kardex, Intake/Output, and MAR    Shift worked:  0348-5951     Shift summary and any significant changes:     Patient scheduled medication given per MAR. Pt continues to receives end of life comfort care. Pt made comfortable by repositioning, pillow support and turning during the shift. Youngblood care completed     Concerns for physician to address:       Zone phone for oncoming shift:          Activity:  Level of Assistance: Dependent, patient does less than 25%    Cardiac:   Cardiac Monitoring:  no    Access:  Current line(s): PIV     Genitourinary:   Urinary Status: Youngblood    Respiratory:   O2 Device: None (Room air)    GI:  Current diet: Diet NPO  DIET ONE TIME MESSAGE;  DIET ONE TIME MESSAGE;    Pain Management:   Patient states pain is manageable on current regimen: YES    Skin:  Cooper Scale Score: 8  Interventions: Wound Offloading (Prevention Methods): Turning, Pillows, Elevate heels  Pressure injury: yes     Patient Safety:  Fall Score: Randhawa Total Score: 30  Fall Risk Interventions  Nursing Judgement-Fall Risk High(Add Comments): Yes  Toilet Every 2 Hours-In Advance of Need: Yes  Hourly Visual Checks: Eyes closed, In bed  Fall Visual Posted: Fall sign posted, Socks  Room Door Open: Deferred to promote rest  Alarm On: Bed  Patient Moved Closer to Nursing Station: No    Active Consults:  None    Length of Stay:  Expected LOS: 12  Actual LOS: 8      Ramsey Granda, RN   
End of Shift Note    Bedside shift change report given to Dalton MENDIETA (oncoming nurse) by Ramsey Granda RN (offgoing nurse).  Report included the following information SBAR, Kardex, Intake/Output, and MAR    Shift worked: 7414-6420     Shift summary and any significant changes:     Patient scheduled medication given per MAR. Pt continues to receives end of life comfort care. Pain medication regimen working effectively to keep patient comfortable. Pt turned and repositioned multiple times on this shift.     Concerns for physician to address:       Zone phone for oncoming shift:          Activity:  Level of Assistance: Dependent, patient does less than 25%    Cardiac:   Cardiac Monitoring:  no    Access:  Current line(s): PIV     Genitourinary:   Urinary Status: Youngblood    Respiratory:   O2 Device: None (Room air)    GI:  Current diet: Diet NPO  DIET ONE TIME MESSAGE;  DIET ONE TIME MESSAGE;    Pain Management:   Patient states pain is manageable on current regimen: YES    Skin:  Cooper Scale Score: 8  Interventions: Wound Offloading (Prevention Methods): Pillows, Repositioning, Turning  Pressure injury: yes     Patient Safety:  Fall Score: Randhawa Total Score: 30  Fall Risk Interventions  Nursing Judgement-Fall Risk High(Add Comments): Yes  Toilet Every 2 Hours-In Advance of Need: Yes  Hourly Visual Checks: Eyes closed, In bed  Fall Visual Posted: Fall sign posted, Socks  Room Door Open: Deferred to promote rest  Alarm On: Bed  Patient Moved Closer to Nursing Station: No    Active Consults:  None    Length of Stay:  Expected LOS: 12  Actual LOS: 9      Ramsey Granda, RN   
End of Shift Note    Bedside shift change report given to FLORES Arenas (oncoming nurse) by Rayna Busby RN (offgoing nurse).  Report included the following information SBAR, Kardex, and MAR    Shift worked:  7 am to 7:30 pm     Shift summary and any significant changes:     All scheduled medications administered. Patient did not require any PRN medications during shift. Repositioning provided. Wasserman care completed and urine output documented. IV flushed and patent. Patient's family at bedside. Frequent rounds and education for family completed.     Concerns for physician to address:       Zone phone for oncoming shift:          Activity:  Level of Assistance: Dependent, patient does less than 25%    Cardiac:   Cardiac Monitoring:  no    Access:  Current line(s): PIV     Genitourinary:   Urinary Status: Wasserman    Respiratory:   O2 Device: None (Room air)    GI:  Current diet: Diet NPO  DIET ONE TIME MESSAGE;  DIET ONE TIME MESSAGE;    Pain Management:   Patient states pain is manageable on current regimen: N/A    Skin:  Cooper Scale Score: 10  Interventions: Wound Offloading (Prevention Methods): Bed, pressure reduction mattress, Repositioning, Pillows, Turning, Elevate heels  Pressure injury: no    Patient Safety:  Fall Score: Randhawa Total Score: 75  Fall Risk Interventions  Nursing Judgement-Fall Risk High(Add Comments): Yes  Toilet Every 2 Hours-In Advance of Need: Yes (wasserman in place)  Hourly Visual Checks: In bed  Fall Visual Posted: Socks  Room Door Open: Deferred to promote rest  Alarm On: Bed  Patient Moved Closer to Nursing Station: No    Active Consults:  None    Length of Stay:  Expected LOS: 5  Actual LOS: 3      Rayna Busby RN    
End of Shift Note    Bedside shift change report given to FLORES Arenas (oncoming nurse) by Rayna Busby RN (offgoing nurse).  Report included the following information SBAR, Kardex, and MAR    Shift worked:  7 am to 7:30 pm     Shift summary and any significant changes:     All scheduled medications administered. Scheduled Dilaudid dose increased to 2 mg every 4 hours. Patient did not require any PRN medications during shift. Repositioning provided. Wasserman care completed and urine output documented. IV flushed and patent. Patient's family at bedside. Frequent rounds and education for family completed.      Concerns for physician to address:       Zone phone for oncoming shift:          Activity:  Level of Assistance: Dependent, patient does less than 25%    Cardiac:   Cardiac Monitoring:  no    Access:  Current line(s): PIV     Genitourinary:   Urinary Status: Wasserman    Respiratory:   O2 Device: None (Room air)    GI:  Current diet: Diet NPO  DIET ONE TIME MESSAGE;  DIET ONE TIME MESSAGE;  DIET ONE TIME MESSAGE;    Pain Management:   Patient states pain is manageable on current regimen: N/A    Skin:  Cooper Scale Score: 9  Interventions: Wound Offloading (Prevention Methods): Bed, pressure reduction mattress, Repositioning, Pillows, Turning, Elevate heels  Pressure injury: no    Patient Safety:  Fall Score: Randhawa Total Score: 75  Fall Risk Interventions  Nursing Judgement-Fall Risk High(Add Comments): Yes  Toilet Every 2 Hours-In Advance of Need: Yes (wasserman in place)  Hourly Visual Checks: In bed  Fall Visual Posted: Socks  Room Door Open: Deferred to promote rest  Alarm On: Bed  Patient Moved Closer to Nursing Station: No    Active Consults:  None    Length of Stay:  Expected LOS: 5  Actual LOS: 4      Rayna Busby RN    
End of Shift Note    Bedside shift change report given to Margret (oncoming nurse) by Holli Otero RN (offgoing nurse).  Report included the following information SBAR, Kardex, and MAR    Shift worked:  7p-7a     Shift summary and any significant changes:     Scheduled medications were given, see MAR.  IV has been flushed and is patent.  Family member was present at bedside throughout my shift.  Patient had a bowel movement during my shift.  Patient was repositioned and Wasserman care was done.  Frequent rounding was done.     Concerns for physician to address:       Zone phone for oncoming shift:          Activity:     Number times ambulated in hallways past shift: 0  Number of times OOB to chair past shift: 0    Cardiac:   Cardiac Monitoring: No           Access:  Current line(s): PIV     Genitourinary:   Urinary status: wasserman    Respiratory:      Chronic home O2 use?: NO  Incentive spirometer at bedside: NO       GI:     Current diet:  Diet NPO  Passing flatus: YES  Tolerating current diet: YES       Pain Management:   Patient states pain is manageable on current regimen: YES    Skin:     Interventions: internal/external urinary devices    Patient Safety:  Fall Score:    Interventions: bed/chair alarm       Length of Stay:  Expected LOS: 5  Actual LOS: 1      Holli Otero, ANAM                            
End of Shift Note    Bedside shift change report given to Margret (oncoming nurse) by Holli Otero RN (offgoing nurse).  Report included the following information SBAR, Kardex, and MAR    Shift worked:  7p-7a     Shift summary and any significant changes:     Scheduled medications were given, see MAR.  IV has been flushed and is patent.  Patient had a bowel movement during my shift.  Patient was repositioned during my shift.  Patient was given Ativan and Dilaudid once each respectively, see PRN MAR.  Family members were present at bedside throughout my shift.  Wasserman  care was done.  Frequent rounding was done.     Concerns for physician to address:       Zone phone for oncoming shift:          Activity:     Number times ambulated in hallways past shift: 0  Number of times OOB to chair past shift: 0    Cardiac:   Cardiac Monitoring: No           Access:  Current line(s): PIV     Genitourinary:   Urinary status: wasserman    Respiratory:      Chronic home O2 use?: NO  Incentive spirometer at bedside: NO       GI:     Current diet:  Diet NPO  Passing flatus: YES  Tolerating current diet: YES       Pain Management:   Patient states pain is manageable on current regimen: YES    Skin:     Interventions: internal/external urinary devices    Patient Safety:  Fall Score:    Interventions: gripper socks       Length of Stay:  Expected LOS: 5  Actual LOS: 2      Holli Otero RN                            
End of Shift Note    Bedside shift change report given to Patricia MENDIETA (oncoming nurse) by Audra Steven RN (offgoing nurse).  Report included the following information SBAR, Kardex, and MAR    Shift worked: Night   Shift summary and any significant changes:    Comfort care given and maintained. Family in the room. Pt turned every 3 hours as needed.  Meds given per MAR order.       Audra Steven RN                            
End of Shift Note    Bedside shift change report given to Rayna MENDIETA (oncoming nurse) by Audra Steven RN (offgoing nurse).  Report included the following information SBAR, Kardex, Intake/Output, and MAR    Shift worked: Night   Shift summary and any significant changes:    Care maintained , bathed , turned /repositioned every 3 hrs. Youngblood care done and cleaned of a large BM during the shift.  Ordered meds given per MAR, didn't need any PRN  as he slept quietly through the shift. Family at bedside.       Audra Steven RN                            
Patient's nurse paged for the death of patient in [phone]. Spouse and daughter present, they were grieving tearfully. Grief support and commendation prayer offered at bedside.  Visited by: Chaplain Esteban Kim M.Div., [facility].   Paging Service: [phone]  
Pt was sleeping his daughter his daughter was at bedside. She continue to keep mcleod. Affirmed our continuing prayer support for family and that we will continue to follow to end of life.   
[facility]   Good Help to Those in Need  [phone]     Patient Name:  Jone Meneses  YOB: 1940       Date of Provider Hospice Visit: 05/10/24     Level of Care:   [x] General Inpatient (GIP)              [] Routine                   [] Respite     Current Location of Care:  [] [facility]           [] [facility]         [x] [facility]        [] [facility]           [] Hospice House ([facility])     IF [facility], patient referred from:  [] [facility]           [] [facility]         [] [facility]        [] [facility]           [] Home         [] Other:      Date of Original Hospice Admission:  5/1/2024  2:16 PM   Hospice Medical Director at time of admission: Dr. Vargas      Principle Hospice Diagnosis:   Sepsis (HCC) [A41.9]   Diagnoses RELATED to the terminal prognosis: Acute colitis, lactic acidosis   Other Diagnoses: Dementia, CKD III     HOSPICE SUMMARY   Jone Meneses is an 83 year old male with a past medical history as below, admitted to [facility] after presenting to ED on 4/27/24 with acute altered mentation, worsening abdominal distention, and persistent diarrhea for 48 hours. Of note, patient at this time was enrolled with [facility] with a primary diagnosis of dementia. With acute onset of altered mental status and increasing somnolence, patient's family opted to bring patient to ED for further management. Upon arrival, patient found to be hypotensive with systolics 80s-90s, elevated lactate 3.4, WBC 26, BUN 47, Cr 4.81 (baseline 2.0). Patient remained unresponsive, CT head negative. CT A/P showed diffuse colonic wall edema, notably in sigmoid colon concerning for acute colitis, in addition to gallstones and distended gallbladder. Patient's spouse was clear that she wanted to continue with not escalating care or aggressive measures, but wanted to pursue IV fluids and IV antibiotics if possible. Pt given IV Flagyl and Ceftriaxone. Patient did not show any significant signs of improvement and patient's spouse opted to continue 
[facility]  Good Help to Those in Need  [phone]    GIP Daily Nursing Note   Patient Name: Jone Meneses  YOB: 1940  Age: 83 y.o.    Date of Visit: 05/05/24  Facility of Care: [facility]  Patient Room: [address] [place] [address]     Hospice Attending: Gus Vargas MD  Hospice Diagnosis: Sepsis (HCC) [A41.9]    Level of Care: GIP    Current GIP Symptoms      1. Pain  2. Agitation/restlessness  3. Unresponsive    ASSESSMENT & PLAN     1.  Continue GIP LOC to manage EOL symptoms of pain and agitation/restlessness.  3. Increased scheduled IV Dilaudid to 2mg every 4 hours with PRN every 15 minutes for pain and/or dyspnea.   4. Continue scheduled IV Ativan 1mg every 4 hours with PRN every 15 minutes.   5. Comfort orders for breakthrough symptoms.   6. Youngblood catheter care per hospital policy for infection prevention.  7. Peripheral line care per hospital policy for infection prevention.   8. Hospice SW and  available to provide support ongoing.       Spiritual Interventions: hospice and hospital  available 24/7 for support    Psych/ Social/ Emotional Interventions:     Care Coordination Needs: communication w/ family, hospice, hospital staff    Care plan and New Orders discussed / approved with Dr. Tim MD    Description History and Chart Review     Narrative History of last 24 hours that demonstrates care cannot be provided in another setting:  Pt requiring frequent assessments by skilled medical staff with administration of IV medications along with adjustments in medication dosage.  This care cannot be provided outside the inpatient setting.     What has been done to control the patient's symptoms in the last 24 hours?  Scheduled IV Dilaudid and Ativan q4hr with PRN available    Does the patient currently require IV medications? Yes  Does the patient currently require scheduled medications? Yes  Does the patient currently require a PCA? No    List number of doses of PRN medications in 
[facility]  Good Help to Those in Need  [phone]    Discharge/Death Nursing Note   Patient Name: Jone Meneses  YOB: 1940  Age: 84 y.o.    Date of Death: 24  Admitted Date: 2024  Time of Death: 0500    Facility of Care: [facility]  Level of Care: [facility]  Patient Room: [address] [place] [address]     Hospice Attending: Gus Vargas MD  Hospice Diagnosis: Sepsis (HCC) [A41.9]    Death Pronouncement   Pronouncement of death completed by:     Carley RN and Dalton RN    Agency staff was not present at the time of death    At the time of death the patient was documented as:    No respiration at 0500. Pt pupil fixed and dilated, on auscultation, no apical pulse heard for 1 full minute, on respiration noted, no movement to painful stimuli.     The pt  within Oncology Unit    The following were notified of the patient's death:     Linda/wife and Lavaunda/daughter present at bedside.    Medications were disposed of per facility protocol     Discharge Summary   Discharge Reason: Death    Summary of Care Provided:    [x] Post mortem care provided by Oncology staff  [x] Notification of  home by nursing supervisor   [] Referrals/Community resources provided:   [] Goals completed  [] Durable Medical Equipment vendor notified     Disciplines involved: [x] RN [] SW [x]  [] ARMAS [] Vol [] PT [] OT [] ST [] BC    [x] IDT communication/notification    Attending Physician, Dr. Gus Vargas, notified of death    Bereaved   Linda/wife (low)         5/10/2024     2:30 PM   Demographics   Marital Status            
[facility]  Good Help to Those in Need  [phone]    GIP Daily Nursing Note   Patient Name: Jone Meneses  YOB: 1940  Age: 83 y.o.    Date of Visit: 05/06/24  Facility of Care: [facility]  Patient Room: [address] [place] [address]     Hospice Attending: Gus Vargas MD  Hospice Diagnosis: Sepsis (HCC) [A41.9]    Level of Care: GIP    Current GIP Symptoms      1. Pain  2. Agitation/restlessness  3. Unresponsive      ASSESSMENT & PLAN     1.  Continue GIP LOC to manage EOL symptoms of pain and agitation/restlessness.  3. Increased scheduled IV Dilaudid to 2mg every 4 hours with PRN every 15 minutes for pain and/or dyspnea.   4. Continue scheduled IV Ativan 1mg every 4 hours with PRN every 15 minutes.   5. Comfort orders for breakthrough symptoms.   6. Youngblood catheter care per hospital policy for infection prevention.  7. Peripheral line care per hospital policy for infection prevention.   8. Hospice SW and  available to provide support ongoing.          Spiritual Interventions: hospice and hospital  available 24/7 for support     Psych/ Social/ Emotional Interventions: pt's spouse and daughter holding mcleod bedside    Care Coordination Needs: communication w/ family, hospice, hospital staff     Care plan and New Orders discussed / approved with Geraldo Dillon NP    Description History and Chart Review     Narrative History of last 24 hours that demonstrates care cannot be provided in another setting:  Pt requiring frequent assessments by skilled medical staff with administration of IV medications along with adjustments in medication dosage. This care cannot be provided outside the inpatient setting.     What has been done to control the patient's symptoms in the last 24 hours?  Scheduled IV Dilaudid and Ativan q4hr with PRN available     Does the patient currently require IV medications? Yes  Does the patient currently require scheduled medications? Yes  Does the patient currently require a 
[facility]   Good Help to Those in Need  [phone]     Patient Name:  Jone Meneses  YOB: 1940       Date of Provider Hospice Visit: 05/08/24     Level of Care:   [x] General Inpatient (GIP)              [] Routine                   [] Respite     Current Location of Care:  [] [facility]           [] [facility]         [x] [facility]        [] [facility]           [] [facility]     IF [facility], patient referred from:  [] [facility]           [] [facility]         [] [facility]        [] [facility]           [] Home         [] Other:      Date of Original Hospice Admission:  5/1/2024  2:16 PM   Hospice Medical Director at time of admission: Dr. Vargas      Principle Hospice Diagnosis:   Sepsis (HCC) [A41.9]   Diagnoses RELATED to the terminal prognosis: Acute colitis, lactic acidosis   Other Diagnoses: Dementia, CKD III     HOSPICE SUMMARY   Jone Meneses is an 83 year old male with a past medical history as below, admitted to [facility] after presenting to ED on 4/27/24 with acute altered mentation, worsening abdominal distention, and persistent diarrhea for 48 hours. Of note, patient at this time was enrolled with [facility] with a primary diagnosis of dementia. With acute onset of altered mental status and increasing somnolence, patient's family opted to bring patient to ED for further management. Upon arrival, patient found to be hypotensive with systolics 80s-90s, elevated lactate 3.4, WBC 26, BUN 47, Cr 4.81 (baseline 2.0). Patient remained unresponsive, CT head negative. CT A/P showed diffuse colonic wall edema, notably in sigmoid colon concerning for acute colitis, in addition to gallstones and distended gallbladder. Patient's spouse was clear that she wanted to continue with not escalating care or aggressive measures, but wanted to pursue IV fluids and IV antibiotics if possible. Pt given IV Flagyl and Ceftriaxone. Patient did not show any significant signs of improvement and patient's spouse opted to continue 
[facility]  Good Help to Those in Need  [phone]    [facility] Daily Nursing Note   Patient Name: Jone Meneses  YOB: 1940  Age: 83 y.o.    Date of Visit: 05/03/24  Facility of Care: [facility]  Patient Room: [address] [place] [address]     Hospice Attending: Gus Vargas MD  Hospice Diagnosis: Sepsis (HCC) [A41.9]    Level of Care: [facility]    Current [facility] Symptoms    1.  Septic shock secondary to acute colitis   2.  Abdominal distention   2.  Unresponsiveness   3.  Nonverbal signs of pain   3.  Increased work of breathing  4.  Agitation, restlessness   5.  Secretions         ASSESSMENT & PLAN     Continue GIP level of care, as patient requires scheduled parenteral medications for adequate control of symptom burden, in addition to frequent nursing assessments and reassessments to assess efficacy and needed titrations of medications. Patient unable to tolerate oral/sublingual medications due to mental status; patient not stable for transport at this time.   Reviewed overnight and morning events, patient has not required any amounts of as needed doses and continues to appear comfortable on scheduled medications.   Continue Dilaudid 1 mg IV every four hours with PRN dosing available every 15 minutes as needed for breakthrough signs of pain, dyspnea.   Continue Ativan 1 mg IV every four hours with PRN dosing available every 15 minutes as needed for breakthrough signs of restlessness, agitation, anxiety.   All other symptom management medications on an as needed basis   Wasserman catheter in place for comfort/bladder decompression; wasserman care per facility protocol   Patient's spouse, daughter, and patient's  present at bedside; discussed signs of physical exam, symptom management medications for symptom burden control, and seemingly short prognosis. Family appreciate of continued hospice support and continue to be agreeable to plan.    and SW to support family needs  Disposition: anticipate death at hospital 
[facility]  Good Help to Those in Need  [phone]    [facility] Daily Nursing Note   Patient Name: Jone Meneses  YOB: 1940  Age: 83 y.o.    Date of Visit: 05/10/24  Facility of Care: [facility]  Patient Room: [address] [place] [address]     Hospice Attending: Gus Vargas MD  Hospice Diagnosis: Sepsis (HCC) [A41.9]    Level of Care: GIP    Current GIP Symptoms    1. Pain  2. Increase work of breathing  3. Agitation/restlessness  4. Unresponsive    ASSESSMENT & PLAN     1. No grimacing/moaning. Continue with scheduled IV dilaudid 3 mg every 3 hours with PRN available.  2. Labored breathing/use of accessory muscles. Increase IV dilaudid  to 3 mg and IV lorazepam 1 mg to every 3 hours with PRN available.  3. No signs of agitation/restlessness. Continue with scheduled IV lorazepam 2 mg every 3 hours with PRN available.  4. Patient is requiring frequent assessments by skilled medical staff for non-verbal cues of distress/discomfort.    Spiritual Interventions: Hospital and Hospice Chaplain available 24/7     Psych/ Social/ Emotional Interventions: Daughter and wife at bedside today. Family declined need for SW at this time. Family is aware that SW is available 24/7.    Care Coordination Needs: Communication with hospital staff and hospice team     Care plan and New Orders discussed / approved with Geraldo Dillon NP.    Description History and Chart Review     Narrative History of last 24 hours that demonstrates care cannot be provided in another setting:    Pt requiring frequent assessments by skilled medical staff with administration of IV medications along with adjustments in medication dosage.  This care cannot be provided outside the inpatient setting.     What has been done to control the patient's symptoms in the last 24 hours?    Increased doses of IV dilaudid and IV lorazepam.    Does the patient currently require IV medications? yes  Does the patient currently require scheduled medications? yes  Does the 
[facility]  Good Help to Those in Need  [phone]    [facility] Daily Nursing Note   Patient Name: Jone Meneses  YOB: 1940  Age: 83 y.o.    Date of Visit: 05/03/24  Facility of Care: [facility]  Patient Room: [address] [place] [address]     Hospice Attending: Gus Vargas MD  Hospice Diagnosis: Sepsis (HCC) [A41.9]    Level of Care: [facility]    Current [facility] Symptoms    1.  Septic shock secondary to acute colitis   2.  Abdominal distention   2.  Unresponsiveness   3.  Nonverbal signs of pain   3.  Increased work of breathing  4.  Agitation, restlessness   5.  Secretions            ASSESSMENT & PLAN   Continue [facility] level of care, as patient requires scheduled parenteral medications for adequate control of symptom burden, in addition to frequent nursing assessments and reassessments to assess efficacy and needed titrations of medications. Patient unable to tolerate oral/sublingual medications due to mental status; patient not stable for transport at this time.   Reviewed overnight and morning events, patient continues to appear comfortable on scheduled medications with one one PRN dose of symptom medications.   Continue Dilaudid 1 mg IV every four hours with PRN dosing available every 15 minutes as needed for breakthrough signs of pain, dyspnea.   Continue Ativan 1 mg IV every four hours with PRN dosing available every 15 minutes as needed for breakthrough signs of restlessness, agitation, anxiety.   All other symptom management medications on an as needed basis   Wasserman catheter in place for comfort/bladder decompression; wasserman care per facility protocol   Patient's spouse and daughter remain present at bedside; continue to be agreeable to hospice plan of care and relay they feel as if patient has remained comfortable. Discussed changes in physical exam as patient feels cooler to the touch and demonstrating signs of peripheral mottling today. Patient's spouse and daughter understanding of changes and prognosis. 
[facility]  Good Help to Those in Need  [phone]    [facility] Daily Nursing Note   Patient Name: Jone Meneses  YOB: 1940  Age: 83 y.o.    Date of Visit: 05/04/24  Facility of Care: [facility]  Patient Room: [address] [place] [address]     Hospice Attending: Gus Vargas MD  Hospice Diagnosis: Sepsis (HCC) [A41.9]    Level of Care: GIP    Current GIP Symptoms    1. Pain  2. Agitation/restlessness  3. Unresponsive    ASSESSMENT & PLAN     1.  No signs of pain. Continue with scheduled IV dilaudid 1 mg every 4 hours with PRN available.  2. No signs of agitation/restlessness. Continue with scheduled IV lorazepam 1 mg every 4 hours with PRN available.  3. Patient is requiring frequent assessments by skilled medical staff for non-verbal cues of distress/discomfort.    Spiritual Interventions: Hospital and Hospice Chaplain available 24/7     Psych/ Social/ Emotional Interventions: Daughter at bedside today. She is very grateful for the care that her father is receiving. Family is aware that SW is available 24/7.    Care Coordination Needs: Communication with hospital staff and hospice team     Care plan and New Orders discussed / approved with Debbie Smith MD.    Description History and Chart Review     Narrative History of last 24 hours that demonstrates care cannot be provided in another setting:    Pt requiring frequent assessments by skilled medical staff with administration of IV medications along with adjustments in medication dosage.  This care cannot be provided outside the inpatient setting.     What has been done to control the patient's symptoms in the last 24 hours?    Continuation of scheduled IV dilaudid and IV lorazepam    Does the patient currently require IV medications? yes  Does the patient currently require scheduled medications? yes  Does the patient currently require a PCA? no    List number of doses of PRN medications in last 24 hours:  Medication 1:  Number of doses:    Medication 2: 
[facility] SW Bereavement/Condolence Call:      This LMSW called pt's daughter Don to offer condolences and support.  Don reported she and her mom are doing okay.  Pt  on his birthday, which family find symbolic.  LMSW offered [facility] Bereavement Services information for ongoing support.       CORI Villafuerte  [facility] Social Worker   [phone]    
Spiritual Care Assessment/Progress Note  [facility]    Name: Jone Meneses MRN: [id number]    Age: 83 y.o.     Sex: male   Language: English     Date: 5/9/2024            Total Time Calculated: 18 min              Spiritual Assessment begun in [facility]  Service Provided For: Family  Referral/Consult From: Yoly  Encounter Overview/Reason: Initial Encounter    Spiritual beliefs:      [x] Involved in a mary tradition/spiritual practice:      [x] Supported by a mary community:      [] Claims no spiritual orientation:      [] Seeking spiritual identity:           [] Adheres to an individual form of spirituality:      [] Not able to assess:                Identified resources for coping and support system:   Support System: Spouse, Children, Nondenominational/mary community       [x] Prayer                  [] Devotional reading               [] Music                  [] Guided Imagery     [] Pet visits                                        [] Other: (COMMENT)     Specific area/focus of visit   Encounter:    Crisis:    Spiritual/Emotional needs: Type: Spiritual Support  Ritual, Rites and Sacraments:    Grief, Loss, and Adjustments: Type: Life Adjustments  Ethics/Mediation:    Behavioral Health:    Palliative Care:    Advance Care Planning:      Plan/Referrals: Continue Support (comment)    Narrative:    Reviewed chart prior to visit on Oncology unit for spiritual assessment and support.  Mr Meneses appeared to be resting comfortably.  His daughter Jaja was present.  She shared that her mother had gone for a walk.  She shared that they are managing at this time.  She shared their  has visited several times and they feel well supported spiritually.  No concerns or needs were expressed at this time.   offered supportive listening presence, assurance of prayer and advised of ongoing  availability.   available upon referral by staff or by family 
Spiritual Care Partner Volunteer visited patient at [facility] in [facility] on 5/6/2024   Documented by:  JOSE LUIS Dale  
This LMSW met with pt and daughter Don at bedside.  Pt resting in bed peacefully and is unresponsive.  LMSW and Don spoke outside of the room.  Pt on hospice for over 2 weeks when family anticipated a quick death.  Don stated she and family do not second guess the decision to enter hospice, but wonder aloud why pt is still here.  Don reported someone is always at bedside with pt.  LMSW discussed that some pts wait to be completely alone before dying, in order to protect their loved ones.  Don reported she thinks pt does not want to die alone, but does believe he would protect Don and his wife.  Dno plans to discuss with pt's wife.  No other needs at this time.  LMSW provided supportive counseling.  LMSW will continue to provide support.  
This was an initial visit to assess needs and offer support. Pt was in bed and appeared to be resting comfortably.  His daughter was at bedside. She noted the significance of their life long relationship and that it is  close family They are [employer] and using their mary to help cope with their pending lost. Offered supportive listening, affirmation of mary,and helped facilitate life review/legacy sharing; as well as encouraged good self care.  She expressed thanks for the visit and support.Thank you for the opportunity to  to this daughter Spiritual Health continue to be available please call/ page as needed/desired.  
patient currently require IV medications? Yes  Does the patient currently require scheduled medications? Yes  Does the patient currently require a PCA? No    List number of doses of PRN medications in last 24 hours:  Medication 1:   Number of doses:     Medication 2:   Number of doses:    Medication 3:   Number of doses:    Supporting documentation for GIP need for pain control:  [x] Frequent evaluation by a doctor, nurse practitioner, nurse   [x] Frequent medication adjustment    [x] IVs that cannot be administered at home   [x] Aggressive pain management   [] Complicated technical delivery of medications              Supporting documentation for GIP need for symptom control:  [x]  Sudden decline necessitating intensive nursing intervention  []  Uncontrolled / intractable nausea or vomiting   []  Pathological fractures  []  Advanced open wounds requiring frequent skilled care  [] Unmanageable respiratory distress  [] New or worsening delirium   [] Delirium with behavior issues: Is 24 hour caregiver present due to safety concerns with agitation? (yes/no)  [x] Imminent death - with skilled nursing needs documented above    DISCHARGE PLANNING     1. Discharge Plan: will  in the hospital; acuity prevents safe transport  2. Patient/Family teaching: symptom management of fever  3. Response to patient/family teaching: Wife agreed to socks being removed.     ASSESSMENT    KARNOFSKY: 10    Prognosis estimated based on 24 clinical assessment is:   [] Few to Many Hours  [x] Hours to Days   [] Few to Many Days   [] Days to Weeks   [] Few to Many Weeks   [] Weeks to Months   [] Few to Many Months    Quality Measure: Patient self-reports:  [] Yes    [x] No    Adult Non-Verbal Pain Assessment Score: /10     Face  [] 0   No particular expression or smile  [x] 1   Occasional grimace, tearing, frowning, wrinkled forehead  [] 2   Frequent grimace, tearing, frowning, wrinkled forehead     Activity (movement)  [x] 0   Lying 
yes  Does the patient currently require a PCA? no    List number of doses of PRN medications in last 24 hours:  Medication 1: lorazepam  Number of doses: 1    Medication 2:   Number of doses:    Medication 3:   Number of doses:    Supporting documentation for GIP need for pain control:  [x] Frequent evaluation by a doctor, nurse practitioner, nurse   [x] Frequent medication adjustment    [x] IVs that cannot be administered at home   [x] Aggressive pain management   [x] Complicated technical delivery of medications              Supporting documentation for GIP need for symptom control:  [x]  Sudden decline necessitating intensive nursing intervention  []  Uncontrolled / intractable nausea or vomiting   []  Pathological fractures  []  Advanced open wounds requiring frequent skilled care  [x] Unmanageable respiratory distress  [] New or worsening delirium   [] Delirium with behavior issues: Is 24 hour caregiver present due to safety concerns with agitation? (yes/no)  [x] Imminent death - with skilled nursing needs documented above    DISCHARGE PLANNING     1. Discharge Plan: placement  2. Family teaching: EOL processes and symptom management  3. Response to family teaching: receptive    ASSESSMENT    KARNOFSKY: 10%    Prognosis estimated based on 05/11/24 clinical assessment is:   [] Few to Many Hours  [x] Hours to Days   [] Few to Many Days   [] Days to Weeks   [] Few to Many Weeks   [] Weeks to Months   [] Few to Many Months    Quality Measure: Patient self-reports:  [] Yes    [x] No    ESAS:   Time of Assessment: 1100  Pain (1-10): 0  Fatigue (1-10):   Shortness of breath (1-10): 0  Nausea (1-10):   Appetite (1-10):   Anxiety: (1-10):   Depression: (1-10):   Well-being: (1-10):   Constipation: No  LAST BM:     CLINICAL INFORMATION   Patient Vitals for the past 12 hrs:   Temp Pulse Resp BP SpO2   05/11/24 0735 99.3 °F (37.4 °C) 73 12 (!) 87/46 94 %   05/11/24 0339 -- -- 12 -- --         Currently this patient 
  Patient's spouse, daughter, and patient's  present at bedside; discussed signs of physical exam, symptom management medications for symptom burden control, and seemingly short prognosis. Family appreciate of continued hospice support and continue to be agreeable to plan.    and SW to support family needs  Disposition: anticipate death at hospital   Hospice Plan of care was reviewed in detail and agree with current plan of care     Prognosis estimated based on today's clinical assessment is:   [x] Hours to Days    [] Days to Weeks    [] Other:     Communicated plan of care with: Hospice Case Manager; Hospice IDT; Care Team      GOALS OF CARE      Patient/Medical POA stated Goal of Care: Hospice care     [x] I have reviewed and/or updated ACP information in the Advance Care Planning Navigator. This information is available in the Adv Care Plan link in the patient's chart header.       Primary Decision Maker (Active): Zaira,Hilda - Spouse - [phone]    Secondary Decision Maker: ZairaDon - Child - [phone]    If DNR is there a Durable DNR on file? : [x] Yes       [] No (If no, complete Durable DNR)     HISTORY      History obtained from: hospice liaison, patient's spouse and daughter      CHIEF COMPLAINT:   The patient is:   [] Verbal  [] Nonverbal  [x] Unresponsive     HPI/SUBJECTIVE: Patient admitted GIP level of care, patient's spouse and daughter at bedside. Appears comfortable; unresponsive, no significant increased work of breathing, no noted restlessness or agitation behaviors, minimally audible secretions. Scheduled symptom management medications as above.     5/2- Remains unresponsive, continues to appear comfortable with neutral facial expression, no increased work of breathing, no observed restlessness. No PRN requirements overnight or this morning.          REVIEW OF SYSTEMS      The following systems were: [] reviewed  [x] unable to be reviewed     Positive ROS 
O2   [x] IV    [] PICC      [] PORT   [] NG Tube    [] PEG Tube   [] Ostomy     [x] Youngblood draining _amber__ urine  [] Other:     SIGNS/PHYSICAL FINDINGS     Skin (including wound):  [] Warm, dry, supple, intact and color normal for race  [x] Warm   [] Dry   [] Cool     [] Clammy       [] Diaphoretic    Turgor   [] Normal   [x] Decreased  Color:   [] Pink   [] Pale   [] Cyanotic   [] Erythema   [] Jaundice   [x] Normal for Race  []  Wounds:    Neuro:  [] Lethargy  [] Restlessness / agitation  [] Confusion / delirium  [] Hallucinations  [] Responds to maximal stimulation  [x] Unresponsive  [] Seizures     Cardiac:  [] Dyspnea on Exertion  [] JVD  [] Murmur  [] Palpitations  [x] Hypotension  [] Hypertension  [] Tachycardia  [x] Bradycardia  [] Irregular HR  [x] Pulses Decreased  [] Pulses Absent  [] Edema:        [] Mottling:          Respiratory:  Breath sounds:    [x] Diminished   [] Wheeze   [] Rhonchi   [] Rales   [] Even and unlabored  [x] Labored:    20        [] Cough   [] Non Productive   [] Productive    [] Description:           [] Deep suctioned   [] O2 at ___ LPM  [] High flow oxygen greater than 10 LPM  [] Bi-Pap    GI  [] Abdomen    [] Ascites  [] Nausea  [] Vomiting  [x] Incontinent of bowels  [x] Bowel sounds - absent  [] Diarrhea  [] Constipation (see above including last bowel movement)  [] Checked for impaction  [] Last BM     Nutrition  Diet:__NPO_____  Appetite:   [] Good   [] Fair   [] Poor   [] Tube Feeding       [] Voiding  [] Incontinent   [x] Youngblood    Musculoskeletal  [] Balance/[place] Unsteady   [x] No movement   Strength:    [] Normal    [] Limited    [] Decreasing   Activities:    [] Up as tolerated   [x] Bedridden    [] Specify:    SAFETY  [x] 24 hr. Caregiver   [x] Side rails ?    [x] Hospital bed   [x] Reviewed Falls & Safety       ALLERGIES AND MEDICATIONS     Allergies:   Allergies   Allergen Reactions    Clopidogrel Other (See Comments)     bradycardia       Current 
on an as needed basis   Wasserman catheter in place for comfort/bladder decompression; wasserman care per facility protocol    and SW to continue to support family needs.  Patient's spouse, daughter, and son at bedside during rounds. Hospice liaison discussed medication frequency change with patient's increased symptom burden from yesterday. Family agreeable and verbalize prioritizing patient's comfort.   Plan of care reviewed with bedside nursing team.   Disposition: anticipate death at hospital   Hospice Plan of care was reviewed in detail and agree with current plan of care     Prognosis estimated based on today's clinical assessment is:   [x] Hours to Days    [] Days to Weeks    [] Other:     Communicated plan of care with: Hospice Case Manager; Hospice IDT; Care Team      GOALS OF CARE      Patient/Medical POA stated Goal of Care: Hospice care     [x] I have reviewed and/or updated ACP information in the Advance Care Planning Navigator. This information is available in the Adv Care Plan link in the patient's chart header.       Primary Decision Maker (Active): Linda Meneses - Spouse - [phone]    Secondary Decision Maker: Don Meneses - Child - [phone]    If DNR is there a Durable DNR on file? : [x] Yes       [] No (If no, complete Durable DNR)     HISTORY      History obtained from: hospice liaison, patient's spouse and daughter      CHIEF COMPLAINT:   The patient is:   [] Verbal  [] Nonverbal  [x] Unresponsive     HPI/SUBJECTIVE: Patient admitted GIP level of care, patient's spouse and daughter at bedside. Appears comfortable; unresponsive, no significant increased work of breathing, no noted restlessness or agitation behaviors, minimally audible secretions. Scheduled symptom management medications as above.     5/2- Remains unresponsive, continues to appear comfortable with neutral facial expression, no increased work of breathing, no observed restlessness. No PRN requirements overnight or 
Exertion  [] JVD  [] Murmur  [] Palpitations  [] Hypotension  [] Hypertension  [x] Tachycardia  [] Bradycardia  [] Irregular HR  [x] Pulses Decreased in feet  [] Pulses Absent  [] Edema:       [] Mottling:          Respiratory:  Breath sounds:    [x] Diminished   [] Wheeze   [] Rhonchi   [] Rales   [] Even and unlabored  [x] Labored:   accessory muscle use         [] Cough   [] Non Productive   [] Productive    [] Description:           [] Deep suctioned   [] O2 at ___ LPM  [] High flow oxygen greater than 10 LPM  [] Bi-Pap    GI  [] Abdomen   [] Ascites  [] Nausea  [] Vomiting  [x] Incontinent of bowels  [x] Bowel sounds (yes/no) hypoactive  [] Diarrhea  [] Constipation (see above including last bowel movement)  [] Checked for impaction  [x] Last BM 5/5/24    Nutrition  Diet: NPO  Appetite:   [] Good   [] Fair   [] Poor   [] Tube Feeding       [] Voiding  [] Incontinent   [x] [place]    Musculoskeletal  [] Balance/[place] Unsteady   [x] No movement  Strength:    [] Normal    [] Limited    [] Decreasing   Activities:    [] Up as tolerated   [x] Bedridden    [] Specify:    SAFETY  [x] 24 hr. Caregiver   [x] Side rails ?    [x] Hospital bed   [x] Reviewed Falls & Safety       ALLERGIES AND MEDICATIONS     Allergies:   Allergies   Allergen Reactions    Clopidogrel Other (See Comments)     bradycardia       Current Facility-Administered Medications   Medication Dose Route Frequency    ketorolac (TORADOL) injection 30 mg  30 mg IntraVENous Q6H PRN    HYDROmorphone HCl PF (DILAUDID) injection 2 mg  2 mg IntraVENous Q15 Min PRN    HYDROmorphone HCl PF (DILAUDID) injection 2 mg  2 mg IntraVENous Q4H    sodium chloride flush 0.9 % injection 5-40 mL  5-40 mL IntraVENous PRN    glycopyrrolate (ROBINUL) injection 0.2 mg  0.2 mg IntraVENous Q4H PRN    LORazepam (ATIVAN) injection 1 mg  1 mg IntraVENous Q4H    LORazepam (ATIVAN) injection 1 mg  1 mg IntraVENous Q15 Min PRN    bisacodyl (DULCOLAX) suppository 10 mg  10 mg Rectal 
assessment, no increased work of breathing or signs of dyspnea, muscle tone and facial expression relaxed. Received one dose of PRN symptom medications early this AM for nonverbal signs of pain with repositioning.     5/5- Pt remains unresponsive, exhibiting increased work of breathing today; stiff and rigid.  No facial expression.  Spouse holding mcleod at bedside today.       REVIEW OF SYSTEMS      The following systems were: [] reviewed  [x] unable to be reviewed     Positive ROS include:  Constitutional: fatigue, weakness, in pain, short of breath  Ears/nose/mouth/throat: increased airway secretions  Respiratory:shortness of breath, wheezing  Gastrointestinal:poor appetite, nausea, vomiting, abdominal pain, constipation, diarrhea  Musculoskeletal:pain, deformities, swelling legs  Neurologic:confusion, hallucinations, weakness  Psychiatric:anxiety, feeling depressed, poor sleep  Endocrine:      Adult Non-Verbal Pain Assessment Score: 3 (after scheduled medications)      Face  [x] 0   No particular expression or smile  [] 1   Occasional grimace, tearing, frowning, wrinkled forehead  [] 2   Frequent grimace, tearing, frowning, wrinkled forehead     Activity (movement)  [x] 0   Lying quietly, normal position  [] 1   Seeking attention through movement or slow, cautious movement  [] 2   Restless, excessive activity and/or withdrawal reflexes     Guarding  [] 0   Lying quietly, no positioning of hands over areas of body  [] 1   Splinting areas of the body, tense  [x] 2   Rigid, stiff     Physiology (vital signs)  [x] 0   Stable vital signs  [] 1   Change in any of the following: SBP > 20mm Hg; HR > 20/minute  [] 2   Change in any of the following: SBP > 30mm Hg; HR > 25/minute     Respiratory  [] 0   Baseline RR/SpO2, compliant with ventilator  [x] 1   RR > 10 above baseline, or 5% drop SpO2, mild asynchrony with ventilator  [] 2   RR > 20 above baseline, or 10% drop SpO2, asynchrony with ventilator      FUNCTIONAL 
noted restlessness or agitation behaviors, minimally audible secretions. Scheduled symptom management medications as above.     5/2- Remains unresponsive, continues to appear comfortable with neutral facial expression, no increased work of breathing, no observed restlessness. No PRN requirements overnight or this morning.     5/3- Continues to appear comfortable on assessment, no increased work of breathing or signs of dyspnea, muscle tone and facial expression relaxed. Received one dose of PRN symptom medications early this AM for nonverbal signs of pain with repositioning.     5/5- Pt remains unresponsive, exhibiting increased work of breathing today; stiff and rigid.  No facial expression.  Spouse holding mcleod at bedside today.     5/6- Remains unresponsive, neutral facial expression, relaxed muscle tone, no significant increase work of breathing, intermittently tachycardic. No restlessness or agitation noted.  Daughter continues to be present at bedside.     5/7-Patient's spouse at bedside. Remains unresponsive, noticeable use of accessory muscles today on exam; spouse agreeable to PRN Dilaudid for breakthrough symptoms of labored breathing in between scheduled doses.       REVIEW OF SYSTEMS      The following systems were: [] reviewed  [x] unable to be reviewed     Positive ROS include:  Constitutional: fatigue, weakness, in pain, short of breath  Ears/nose/mouth/throat: increased airway secretions  Respiratory:shortness of breath, wheezing  Gastrointestinal:poor appetite, nausea, vomiting, abdominal pain, constipation, diarrhea  Musculoskeletal:pain, deformities, swelling legs  Neurologic:confusion, hallucinations, weakness  Psychiatric:anxiety, feeling depressed, poor sleep  Endocrine:      Adult Non-Verbal Pain Assessment Score: 1 (after scheduled medications)      Face  [x] 0   No particular expression or smile  [] 1   Occasional grimace, tearing, frowning, wrinkled forehead  [] 2   Frequent grimace, 
04/27/2024    GLUCOSE 86 04/27/2024    CALCIUM 8.8 04/27/2024    BILITOT 2.3 (H) 04/27/2024    ALKPHOS 45 04/27/2024    AST 53 (H) 04/27/2024    ALT 32 04/27/2024    LABGLOM 11 (L) 04/27/2024    GFRAA 34 (L) 08/11/2022    AGRATIO 1.3 04/17/2023    GLOB 4.2 (H) 04/27/2024       CT A/P -- 4/27  IMPRESSION:   Diffuse colonic wall edema most substantial in the sigmoid colon is concerning  for acute colitis. Gallstones in a distended gallbladder.             Geraldo Dillon, APRN - CNP      
Factors: (boni all that apply):    [x]  No burden evident   []  Alcohol abuse  []  Financial resources inadequate to meet basic needs (food/house/etc)  []  Financial resources inadequate to meet health care needs (supplies/equipment/medications)  []  Food/nutrition resources inadequate  []  Home environment unsafe/inadequate for home care  []  Homicidal risk  []  Lives alone or without concerned relatives  []  Multiple medications/complex schedule  []  Physical limitations increase likelihood of falls  []  Plan of care/treatments complicated  []  Substance use/abuse  []  Suicidal risk  []  Visual impairment threatens safety/ability to perform self-care  []  Other (specify):     Abuse/Neglect (actual/potential risks):  [x]  No signs of abuse/neglect  []  History of abuse/neglect                 []  Physical          []  Sexual  []  History of domestic violence  []  Lacks adequate physical care  []  Lacks emotional nurturing/support  []  Lacks appropriate stimulation/cognitive experiences  []  Left alone inappropriately  []  Lacks necessary supervision  []  Inadequate or delayed medical care  []  Unsafe environment (i.e guns/drug use/history of violence in the home/etc.)  []  Bruising or other physical signs of injury present  []  Refer to child/adult protective services  []  Other (specify):   Notes:      Current Sources of Stress (in Addition to Current Illness):   [x]  None reported  []  Bills/Debt    []  Career/Job change    []   (short term)    []   (long term)    []  Death of a child (recent)    []  Death of a parent (recent)   []  Death of a spouse (recent)   []  Employment status changed   []  Family discord    []  Financial loss/Inadequate inther (specify):come  []  Job loss  []  Legal issues unresolved  []  Lifestyle change  []  Marital discord  []  Marriage within the last year  []  Paperwork (insurance/legal/etc) overwhelming  []  Separation/Divorce  []  Other (specify):  Notes: 
touch, slight mottling noted in bilateral fingertips   Neurologic: unresponsive  Psychiatric: unresponsive, no current agitation or restlessness      Pertinent Lab and or Imaging Tests:  Lab Results   Component Value Date     04/27/2024    K 4.6 04/27/2024     (H) 04/27/2024    CO2 21 04/27/2024    BUN 47 (H) 04/27/2024    CREATININE 4.98 (H) 04/27/2024    GLUCOSE 86 04/27/2024    CALCIUM 8.8 04/27/2024    BILITOT 2.3 (H) 04/27/2024    ALKPHOS 45 04/27/2024    AST 53 (H) 04/27/2024    ALT 32 04/27/2024    LABGLOM 11 (L) 04/27/2024    GFRAA 34 (L) 08/11/2022    AGRATIO 1.3 04/17/2023    GLOB 4.2 (H) 04/27/2024       CT A/P -- 4/27  IMPRESSION:   Diffuse colonic wall edema most substantial in the sigmoid colon is concerning  for acute colitis. Gallstones in a distended gallbladder.             Geraldo Dillon, APRN - CNP      
to include MD, SN, SW, CH and support team    ALLERGIES AND MEDICATIONS     Allergies:   Allergies   Allergen Reactions    Clopidogrel Other (See Comments)     bradycardia     Current Facility-Administered Medications   Medication Dose Route Frequency    sodium chloride flush 0.9 % injection 5-40 mL  5-40 mL IntraVENous PRN    glycopyrrolate (ROBINUL) injection 0.2 mg  0.2 mg IntraVENous Q4H PRN    HYDROmorphone HCl PF (DILAUDID) injection 1 mg  1 mg IntraVENous Q4H    LORazepam (ATIVAN) injection 1 mg  1 mg IntraVENous Q4H    LORazepam (ATIVAN) injection 1 mg  1 mg IntraVENous Q15 Min PRN    HYDROmorphone HCl PF (DILAUDID) injection 1 mg  1 mg IntraVENous Q15 Min PRN    bisacodyl (DULCOLAX) suppository 10 mg  10 mg Rectal Daily PRN    ketorolac (TORADOL) injection 30 mg  30 mg IntraVENous Q6H PRN